# Patient Record
Sex: MALE | Race: WHITE | Employment: PART TIME | URBAN - METROPOLITAN AREA
[De-identification: names, ages, dates, MRNs, and addresses within clinical notes are randomized per-mention and may not be internally consistent; named-entity substitution may affect disease eponyms.]

---

## 2019-11-16 ENCOUNTER — HOSPITAL ENCOUNTER (EMERGENCY)
Age: 34
Discharge: HOME | End: 2019-11-16
Attending: EMERGENCY MEDICINE
Payer: COMMERCIAL

## 2019-11-16 VITALS
HEART RATE: 71 BPM | OXYGEN SATURATION: 97 % | TEMPERATURE: 98 F | WEIGHT: 182 LBS | BODY MASS INDEX: 26.96 KG/M2 | HEIGHT: 69 IN | DIASTOLIC BLOOD PRESSURE: 81 MMHG | SYSTOLIC BLOOD PRESSURE: 136 MMHG | RESPIRATION RATE: 18 BRPM

## 2019-11-16 DIAGNOSIS — R11.2 NON-INTRACTABLE VOMITING WITH NAUSEA, UNSPECIFIED VOMITING TYPE: Primary | ICD-10-CM

## 2019-11-16 PROCEDURE — 6360000002 HC RX W HCPCS: Performed by: EMERGENCY MEDICINE

## 2019-11-16 PROCEDURE — 99283 EMERGENCY DEPT VISIT LOW MDM: CPT

## 2019-11-16 PROCEDURE — 96372 THER/PROPH/DIAG INJ SC/IM: CPT

## 2019-11-16 RX ORDER — ONDANSETRON 2 MG/ML
4 INJECTION INTRAMUSCULAR; INTRAVENOUS ONCE
Status: COMPLETED | OUTPATIENT
Start: 2019-11-16 | End: 2019-11-16

## 2019-11-16 RX ORDER — ONDANSETRON 4 MG/1
4 TABLET, FILM COATED ORAL EVERY 8 HOURS PRN
Qty: 20 TABLET | Refills: 0 | Status: ON HOLD | OUTPATIENT
Start: 2019-11-16 | End: 2019-12-07 | Stop reason: HOSPADM

## 2019-11-16 RX ADMIN — ONDANSETRON 4 MG: 2 INJECTION INTRAMUSCULAR; INTRAVENOUS at 01:07

## 2019-11-16 ASSESSMENT — PAIN SCALES - GENERAL
PAINLEVEL_OUTOF10: 0
PAINLEVEL_OUTOF10: 0

## 2019-12-04 ENCOUNTER — APPOINTMENT (OUTPATIENT)
Dept: CT IMAGING | Age: 34
DRG: 282 | End: 2019-12-04
Payer: COMMERCIAL

## 2019-12-04 ENCOUNTER — HOSPITAL ENCOUNTER (INPATIENT)
Age: 34
LOS: 2 days | Discharge: HOME | DRG: 282 | End: 2019-12-07
Attending: EMERGENCY MEDICINE | Admitting: INTERNAL MEDICINE
Payer: COMMERCIAL

## 2019-12-04 DIAGNOSIS — R10.13 EPIGASTRIC PAIN: Primary | ICD-10-CM

## 2019-12-04 DIAGNOSIS — K85.90 ACUTE PANCREATITIS, UNSPECIFIED COMPLICATION STATUS, UNSPECIFIED PANCREATITIS TYPE: ICD-10-CM

## 2019-12-04 DIAGNOSIS — S36.119A: ICD-10-CM

## 2019-12-04 LAB
A/G RATIO: 1.3 (ref 1.1–2.2)
ALBUMIN SERPL-MCNC: 4.5 G/DL (ref 3.4–5)
ALP BLD-CCNC: 100 U/L (ref 40–129)
ALT SERPL-CCNC: 440 U/L (ref 10–40)
ANION GAP SERPL CALCULATED.3IONS-SCNC: 14 MMOL/L (ref 3–16)
AST SERPL-CCNC: 563 U/L (ref 15–37)
BASOPHILS ABSOLUTE: 0.1 K/UL (ref 0–0.2)
BASOPHILS RELATIVE PERCENT: 0.7 %
BILIRUB SERPL-MCNC: 0.7 MG/DL (ref 0–1)
BUN BLDV-MCNC: 10 MG/DL (ref 7–20)
CALCIUM SERPL-MCNC: 8.8 MG/DL (ref 8.3–10.6)
CHLORIDE BLD-SCNC: 100 MMOL/L (ref 99–110)
CO2: 24 MMOL/L (ref 21–32)
CREAT SERPL-MCNC: 0.6 MG/DL (ref 0.9–1.3)
EOSINOPHILS ABSOLUTE: 0 K/UL (ref 0–0.6)
EOSINOPHILS RELATIVE PERCENT: 0.4 %
GFR AFRICAN AMERICAN: >60
GFR NON-AFRICAN AMERICAN: >60
GLOBULIN: 3.5 G/DL
GLUCOSE BLD-MCNC: 110 MG/DL (ref 70–99)
HCT VFR BLD CALC: 42.1 % (ref 40.5–52.5)
HEMOGLOBIN: 14.8 G/DL (ref 13.5–17.5)
LIPASE: 224 U/L (ref 13–60)
LYMPHOCYTES ABSOLUTE: 1.7 K/UL (ref 1–5.1)
LYMPHOCYTES RELATIVE PERCENT: 22.2 %
MCH RBC QN AUTO: 32.4 PG (ref 26–34)
MCHC RBC AUTO-ENTMCNC: 35.1 G/DL (ref 31–36)
MCV RBC AUTO: 92.2 FL (ref 80–100)
MONOCYTES ABSOLUTE: 0.7 K/UL (ref 0–1.3)
MONOCYTES RELATIVE PERCENT: 9.4 %
NEUTROPHILS ABSOLUTE: 5.3 K/UL (ref 1.7–7.7)
NEUTROPHILS RELATIVE PERCENT: 67.3 %
PDW BLD-RTO: 13.7 % (ref 12.4–15.4)
PLATELET # BLD: 264 K/UL (ref 135–450)
PMV BLD AUTO: 8.9 FL (ref 5–10.5)
POTASSIUM REFLEX MAGNESIUM: 3.7 MMOL/L (ref 3.5–5.1)
RBC # BLD: 4.57 M/UL (ref 4.2–5.9)
SODIUM BLD-SCNC: 138 MMOL/L (ref 136–145)
TOTAL PROTEIN: 8 G/DL (ref 6.4–8.2)
WBC # BLD: 7.9 K/UL (ref 4–11)

## 2019-12-04 PROCEDURE — 85025 COMPLETE CBC W/AUTO DIFF WBC: CPT

## 2019-12-04 PROCEDURE — 83690 ASSAY OF LIPASE: CPT

## 2019-12-04 PROCEDURE — 80053 COMPREHEN METABOLIC PANEL: CPT

## 2019-12-04 PROCEDURE — 80074 ACUTE HEPATITIS PANEL: CPT

## 2019-12-04 PROCEDURE — 99285 EMERGENCY DEPT VISIT HI MDM: CPT

## 2019-12-04 RX ORDER — MORPHINE SULFATE 4 MG/ML
4 INJECTION, SOLUTION INTRAMUSCULAR; INTRAVENOUS ONCE
Status: COMPLETED | OUTPATIENT
Start: 2019-12-05 | End: 2019-12-05

## 2019-12-04 RX ORDER — ONDANSETRON 2 MG/ML
4 INJECTION INTRAMUSCULAR; INTRAVENOUS ONCE
Status: COMPLETED | OUTPATIENT
Start: 2019-12-04 | End: 2019-12-05

## 2019-12-04 RX ORDER — HYDROCODONE BITARTRATE AND ACETAMINOPHEN 5; 325 MG/1; MG/1
1 TABLET ORAL ONCE
Status: DISCONTINUED | OUTPATIENT
Start: 2019-12-04 | End: 2019-12-04

## 2019-12-04 RX ORDER — 0.9 % SODIUM CHLORIDE 0.9 %
500 INTRAVENOUS SOLUTION INTRAVENOUS ONCE
Status: COMPLETED | OUTPATIENT
Start: 2019-12-05 | End: 2019-12-05

## 2019-12-04 ASSESSMENT — PAIN DESCRIPTION - LOCATION: LOCATION: ABDOMEN

## 2019-12-04 ASSESSMENT — PAIN SCALES - GENERAL: PAINLEVEL_OUTOF10: 7

## 2019-12-04 ASSESSMENT — PAIN DESCRIPTION - ONSET: ONSET: ON-GOING

## 2019-12-04 ASSESSMENT — PAIN DESCRIPTION - ORIENTATION: ORIENTATION: RIGHT

## 2019-12-04 ASSESSMENT — PAIN DESCRIPTION - PROGRESSION: CLINICAL_PROGRESSION: NOT CHANGED

## 2019-12-04 ASSESSMENT — PAIN - FUNCTIONAL ASSESSMENT: PAIN_FUNCTIONAL_ASSESSMENT: PREVENTS OR INTERFERES WITH MANY ACTIVE NOT PASSIVE ACTIVITIES

## 2019-12-04 ASSESSMENT — PAIN DESCRIPTION - FREQUENCY: FREQUENCY: CONTINUOUS

## 2019-12-04 ASSESSMENT — PAIN DESCRIPTION - PAIN TYPE: TYPE: ACUTE PAIN

## 2019-12-04 ASSESSMENT — PAIN DESCRIPTION - DESCRIPTORS: DESCRIPTORS: ACHING

## 2019-12-05 ENCOUNTER — APPOINTMENT (OUTPATIENT)
Dept: ULTRASOUND IMAGING | Age: 34
DRG: 282 | End: 2019-12-05
Payer: COMMERCIAL

## 2019-12-05 ENCOUNTER — APPOINTMENT (OUTPATIENT)
Dept: CT IMAGING | Age: 34
DRG: 282 | End: 2019-12-05
Payer: COMMERCIAL

## 2019-12-05 PROBLEM — K85.90 PANCREATITIS, UNSPECIFIED PANCREATITIS TYPE: Status: ACTIVE | Noted: 2019-12-05

## 2019-12-05 LAB
A/G RATIO: 1.3 (ref 1.1–2.2)
ALBUMIN SERPL-MCNC: 4.5 G/DL (ref 3.4–5)
ALP BLD-CCNC: 95 U/L (ref 40–129)
ALT SERPL-CCNC: 412 U/L (ref 10–40)
ANION GAP SERPL CALCULATED.3IONS-SCNC: 15 MMOL/L (ref 3–16)
AST SERPL-CCNC: 492 U/L (ref 15–37)
BASOPHILS ABSOLUTE: 0.1 K/UL (ref 0–0.2)
BASOPHILS RELATIVE PERCENT: 0.8 %
BILIRUB SERPL-MCNC: 1.2 MG/DL (ref 0–1)
BILIRUBIN URINE: ABNORMAL
BLOOD, URINE: NEGATIVE
BUN BLDV-MCNC: 11 MG/DL (ref 7–20)
C DIFF TOXIN/ANTIGEN: NORMAL
CALCIUM SERPL-MCNC: 9.1 MG/DL (ref 8.3–10.6)
CHLORIDE BLD-SCNC: 97 MMOL/L (ref 99–110)
CLARITY: CLEAR
CO2: 22 MMOL/L (ref 21–32)
COLOR: ABNORMAL
CREAT SERPL-MCNC: 0.8 MG/DL (ref 0.9–1.3)
EOSINOPHILS ABSOLUTE: 0.1 K/UL (ref 0–0.6)
EOSINOPHILS RELATIVE PERCENT: 0.9 %
GFR AFRICAN AMERICAN: >60
GFR NON-AFRICAN AMERICAN: >60
GLOBULIN: 3.5 G/DL
GLUCOSE BLD-MCNC: 114 MG/DL (ref 70–99)
GLUCOSE URINE: NEGATIVE MG/DL
HAV IGM SER IA-ACNC: ABNORMAL
HCT VFR BLD CALC: 41.3 % (ref 40.5–52.5)
HEMOGLOBIN: 14.6 G/DL (ref 13.5–17.5)
HEPATITIS B CORE IGM ANTIBODY: ABNORMAL
HEPATITIS B SURFACE ANTIGEN INTERPRETATION: ABNORMAL
HEPATITIS C ANTIBODY INTERPRETATION: REACTIVE
KETONES, URINE: NEGATIVE MG/DL
LEUKOCYTE ESTERASE, URINE: NEGATIVE
LYMPHOCYTES ABSOLUTE: 2.6 K/UL (ref 1–5.1)
LYMPHOCYTES RELATIVE PERCENT: 19.8 %
MCH RBC QN AUTO: 32.5 PG (ref 26–34)
MCHC RBC AUTO-ENTMCNC: 35.3 G/DL (ref 31–36)
MCV RBC AUTO: 92.2 FL (ref 80–100)
MICROSCOPIC EXAMINATION: ABNORMAL
MONOCYTES ABSOLUTE: 1.5 K/UL (ref 0–1.3)
MONOCYTES RELATIVE PERCENT: 11.3 %
NEUTROPHILS ABSOLUTE: 8.6 K/UL (ref 1.7–7.7)
NEUTROPHILS RELATIVE PERCENT: 67.2 %
NITRITE, URINE: NEGATIVE
PDW BLD-RTO: 13.6 % (ref 12.4–15.4)
PH UA: 6 (ref 5–8)
PLATELET # BLD: 279 K/UL (ref 135–450)
PMV BLD AUTO: 8.9 FL (ref 5–10.5)
POTASSIUM REFLEX MAGNESIUM: 3.7 MMOL/L (ref 3.5–5.1)
PROTEIN UA: NEGATIVE MG/DL
RBC # BLD: 4.48 M/UL (ref 4.2–5.9)
SODIUM BLD-SCNC: 134 MMOL/L (ref 136–145)
SPECIFIC GRAVITY UA: 1.02 (ref 1–1.03)
TOTAL PROTEIN: 8 G/DL (ref 6.4–8.2)
URINE REFLEX TO CULTURE: ABNORMAL
URINE TYPE: ABNORMAL
UROBILINOGEN, URINE: 1 E.U./DL
WBC # BLD: 12.9 K/UL (ref 4–11)

## 2019-12-05 PROCEDURE — 96375 TX/PRO/DX INJ NEW DRUG ADDON: CPT

## 2019-12-05 PROCEDURE — 6360000002 HC RX W HCPCS: Performed by: INTERNAL MEDICINE

## 2019-12-05 PROCEDURE — 80053 COMPREHEN METABOLIC PANEL: CPT

## 2019-12-05 PROCEDURE — 1200000000 HC SEMI PRIVATE

## 2019-12-05 PROCEDURE — 85025 COMPLETE CBC W/AUTO DIFF WBC: CPT

## 2019-12-05 PROCEDURE — 96374 THER/PROPH/DIAG INJ IV PUSH: CPT

## 2019-12-05 PROCEDURE — 6370000000 HC RX 637 (ALT 250 FOR IP): Performed by: INTERNAL MEDICINE

## 2019-12-05 PROCEDURE — 2580000003 HC RX 258: Performed by: INTERNAL MEDICINE

## 2019-12-05 PROCEDURE — 2500000003 HC RX 250 WO HCPCS: Performed by: INTERNAL MEDICINE

## 2019-12-05 PROCEDURE — 74176 CT ABD & PELVIS W/O CONTRAST: CPT

## 2019-12-05 PROCEDURE — 87449 NOS EACH ORGANISM AG IA: CPT

## 2019-12-05 PROCEDURE — 81003 URINALYSIS AUTO W/O SCOPE: CPT

## 2019-12-05 PROCEDURE — 74177 CT ABD & PELVIS W/CONTRAST: CPT

## 2019-12-05 PROCEDURE — 87324 CLOSTRIDIUM AG IA: CPT

## 2019-12-05 PROCEDURE — 76705 ECHO EXAM OF ABDOMEN: CPT

## 2019-12-05 PROCEDURE — 2580000003 HC RX 258: Performed by: EMERGENCY MEDICINE

## 2019-12-05 PROCEDURE — 6360000004 HC RX CONTRAST MEDICATION: Performed by: EMERGENCY MEDICINE

## 2019-12-05 PROCEDURE — 6360000002 HC RX W HCPCS: Performed by: EMERGENCY MEDICINE

## 2019-12-05 PROCEDURE — 36415 COLL VENOUS BLD VENIPUNCTURE: CPT

## 2019-12-05 RX ORDER — SODIUM CHLORIDE 9 MG/ML
INJECTION, SOLUTION INTRAVENOUS CONTINUOUS
Status: DISCONTINUED | OUTPATIENT
Start: 2019-12-05 | End: 2019-12-07 | Stop reason: HOSPADM

## 2019-12-05 RX ORDER — FAMOTIDINE 20 MG/1
20 TABLET, FILM COATED ORAL 2 TIMES DAILY
Status: DISCONTINUED | OUTPATIENT
Start: 2019-12-05 | End: 2019-12-07 | Stop reason: HOSPADM

## 2019-12-05 RX ORDER — MORPHINE SULFATE 4 MG/ML
4 INJECTION, SOLUTION INTRAMUSCULAR; INTRAVENOUS ONCE
Status: COMPLETED | OUTPATIENT
Start: 2019-12-05 | End: 2019-12-05

## 2019-12-05 RX ORDER — MORPHINE SULFATE 4 MG/ML
4 INJECTION, SOLUTION INTRAMUSCULAR; INTRAVENOUS
Status: DISCONTINUED | OUTPATIENT
Start: 2019-12-05 | End: 2019-12-07 | Stop reason: HOSPADM

## 2019-12-05 RX ORDER — SODIUM CHLORIDE 0.9 % (FLUSH) 0.9 %
10 SYRINGE (ML) INJECTION PRN
Status: DISCONTINUED | OUTPATIENT
Start: 2019-12-05 | End: 2019-12-07 | Stop reason: HOSPADM

## 2019-12-05 RX ORDER — NICOTINE 21 MG/24HR
1 PATCH, TRANSDERMAL 24 HOURS TRANSDERMAL DAILY
Status: DISCONTINUED | OUTPATIENT
Start: 2019-12-05 | End: 2019-12-07 | Stop reason: HOSPADM

## 2019-12-05 RX ORDER — ONDANSETRON 2 MG/ML
4 INJECTION INTRAMUSCULAR; INTRAVENOUS EVERY 4 HOURS PRN
Status: DISCONTINUED | OUTPATIENT
Start: 2019-12-05 | End: 2019-12-07 | Stop reason: HOSPADM

## 2019-12-05 RX ORDER — SODIUM CHLORIDE 0.9 % (FLUSH) 0.9 %
10 SYRINGE (ML) INJECTION EVERY 12 HOURS SCHEDULED
Status: DISCONTINUED | OUTPATIENT
Start: 2019-12-05 | End: 2019-12-07 | Stop reason: HOSPADM

## 2019-12-05 RX ORDER — 0.9 % SODIUM CHLORIDE 0.9 %
500 INTRAVENOUS SOLUTION INTRAVENOUS ONCE
Status: COMPLETED | OUTPATIENT
Start: 2019-12-05 | End: 2019-12-05

## 2019-12-05 RX ADMIN — ONDANSETRON 4 MG: 2 INJECTION INTRAMUSCULAR; INTRAVENOUS at 04:27

## 2019-12-05 RX ADMIN — MORPHINE SULFATE 4 MG: 4 INJECTION, SOLUTION INTRAMUSCULAR; INTRAVENOUS at 10:35

## 2019-12-05 RX ADMIN — ENOXAPARIN SODIUM 40 MG: 40 INJECTION SUBCUTANEOUS at 09:11

## 2019-12-05 RX ADMIN — MORPHINE SULFATE 4 MG: 4 INJECTION, SOLUTION INTRAMUSCULAR; INTRAVENOUS at 16:16

## 2019-12-05 RX ADMIN — FAMOTIDINE 20 MG: 20 TABLET, FILM COATED ORAL at 20:02

## 2019-12-05 RX ADMIN — SODIUM CHLORIDE 500 ML: 9 INJECTION, SOLUTION INTRAVENOUS at 05:48

## 2019-12-05 RX ADMIN — MORPHINE SULFATE 4 MG: 4 INJECTION, SOLUTION INTRAMUSCULAR; INTRAVENOUS at 02:35

## 2019-12-05 RX ADMIN — IOPAMIDOL 75 ML: 755 INJECTION, SOLUTION INTRAVENOUS at 01:11

## 2019-12-05 RX ADMIN — MORPHINE SULFATE 4 MG: 4 INJECTION, SOLUTION INTRAMUSCULAR; INTRAVENOUS at 20:02

## 2019-12-05 RX ADMIN — FOLIC ACID: 5 INJECTION, SOLUTION INTRAMUSCULAR; INTRAVENOUS; SUBCUTANEOUS at 18:13

## 2019-12-05 RX ADMIN — MORPHINE SULFATE 4 MG: 4 INJECTION, SOLUTION INTRAMUSCULAR; INTRAVENOUS at 06:53

## 2019-12-05 RX ADMIN — SODIUM CHLORIDE: 9 INJECTION, SOLUTION INTRAVENOUS at 23:13

## 2019-12-05 RX ADMIN — SODIUM CHLORIDE: 9 INJECTION, SOLUTION INTRAVENOUS at 16:23

## 2019-12-05 RX ADMIN — SODIUM CHLORIDE 500 ML: 9 INJECTION, SOLUTION INTRAVENOUS at 02:34

## 2019-12-05 RX ADMIN — SODIUM CHLORIDE: 9 INJECTION, SOLUTION INTRAVENOUS at 06:51

## 2019-12-05 RX ADMIN — FAMOTIDINE 20 MG: 20 TABLET, FILM COATED ORAL at 09:11

## 2019-12-05 RX ADMIN — MORPHINE SULFATE 4 MG: 4 INJECTION, SOLUTION INTRAMUSCULAR; INTRAVENOUS at 04:29

## 2019-12-05 RX ADMIN — ONDANSETRON 4 MG: 2 INJECTION INTRAMUSCULAR; INTRAVENOUS at 02:34

## 2019-12-05 ASSESSMENT — PAIN DESCRIPTION - PAIN TYPE
TYPE: ACUTE PAIN

## 2019-12-05 ASSESSMENT — PAIN SCALES - GENERAL
PAINLEVEL_OUTOF10: 8
PAINLEVEL_OUTOF10: 8
PAINLEVEL_OUTOF10: 0
PAINLEVEL_OUTOF10: 8
PAINLEVEL_OUTOF10: 5
PAINLEVEL_OUTOF10: 2
PAINLEVEL_OUTOF10: 9
PAINLEVEL_OUTOF10: 8
PAINLEVEL_OUTOF10: 5
PAINLEVEL_OUTOF10: 9

## 2019-12-05 ASSESSMENT — PAIN DESCRIPTION - ONSET
ONSET: ON-GOING

## 2019-12-05 ASSESSMENT — PAIN - FUNCTIONAL ASSESSMENT
PAIN_FUNCTIONAL_ASSESSMENT: ACTIVITIES ARE NOT PREVENTED
PAIN_FUNCTIONAL_ASSESSMENT: ACTIVITIES ARE NOT PREVENTED
PAIN_FUNCTIONAL_ASSESSMENT: PREVENTS OR INTERFERES SOME ACTIVE ACTIVITIES AND ADLS
PAIN_FUNCTIONAL_ASSESSMENT: PREVENTS OR INTERFERES SOME ACTIVE ACTIVITIES AND ADLS

## 2019-12-05 ASSESSMENT — PAIN DESCRIPTION - DESCRIPTORS
DESCRIPTORS: BURNING;PRESSURE;SHOOTING
DESCRIPTORS: BURNING;PRESSURE;SHOOTING
DESCRIPTORS: ACHING
DESCRIPTORS: ACHING
DESCRIPTORS: BURNING;SHOOTING
DESCRIPTORS: PRESSURE

## 2019-12-05 ASSESSMENT — PAIN DESCRIPTION - LOCATION
LOCATION: ABDOMEN

## 2019-12-05 ASSESSMENT — PAIN DESCRIPTION - FREQUENCY
FREQUENCY: CONTINUOUS
FREQUENCY: INTERMITTENT
FREQUENCY: INTERMITTENT
FREQUENCY: CONTINUOUS
FREQUENCY: INTERMITTENT
FREQUENCY: CONTINUOUS

## 2019-12-05 ASSESSMENT — PAIN DESCRIPTION - ORIENTATION
ORIENTATION: RIGHT
ORIENTATION: RIGHT;LEFT
ORIENTATION: RIGHT;LEFT
ORIENTATION: RIGHT
ORIENTATION: RIGHT;LEFT
ORIENTATION: UPPER
ORIENTATION: RIGHT;LEFT

## 2019-12-05 ASSESSMENT — ENCOUNTER SYMPTOMS
RHINORRHEA: 0
BACK PAIN: 0
NAUSEA: 1
ABDOMINAL PAIN: 1
VOMITING: 0
COLOR CHANGE: 0
WHEEZING: 0
SHORTNESS OF BREATH: 0
PHOTOPHOBIA: 0

## 2019-12-05 ASSESSMENT — PAIN DESCRIPTION - PROGRESSION
CLINICAL_PROGRESSION: GRADUALLY WORSENING
CLINICAL_PROGRESSION: NOT CHANGED

## 2019-12-05 ASSESSMENT — PAIN DESCRIPTION - DIRECTION: RADIATING_TOWARDS: NON RADIATING

## 2019-12-06 ENCOUNTER — APPOINTMENT (OUTPATIENT)
Dept: MRI IMAGING | Age: 34
DRG: 282 | End: 2019-12-06
Payer: COMMERCIAL

## 2019-12-06 PROBLEM — F10.10 ALCOHOL ABUSE: Status: ACTIVE | Noted: 2019-12-06

## 2019-12-06 PROBLEM — K80.20 CHOLELITHIASIS: Status: ACTIVE | Noted: 2019-12-06

## 2019-12-06 PROBLEM — F17.200 TOBACCO DEPENDENCE: Status: ACTIVE | Noted: 2019-12-06

## 2019-12-06 LAB
A/G RATIO: 1.3 (ref 1.1–2.2)
ALBUMIN SERPL-MCNC: 3.9 G/DL (ref 3.4–5)
ALP BLD-CCNC: 85 U/L (ref 40–129)
ALT SERPL-CCNC: 286 U/L (ref 10–40)
ANION GAP SERPL CALCULATED.3IONS-SCNC: 13 MMOL/L (ref 3–16)
AST SERPL-CCNC: 301 U/L (ref 15–37)
BASOPHILS ABSOLUTE: 0 K/UL (ref 0–0.2)
BASOPHILS RELATIVE PERCENT: 0.7 %
BILIRUB SERPL-MCNC: 1.8 MG/DL (ref 0–1)
BUN BLDV-MCNC: 7 MG/DL (ref 7–20)
CALCIUM SERPL-MCNC: 8.4 MG/DL (ref 8.3–10.6)
CHLORIDE BLD-SCNC: 102 MMOL/L (ref 99–110)
CO2: 22 MMOL/L (ref 21–32)
CREAT SERPL-MCNC: 0.6 MG/DL (ref 0.9–1.3)
EOSINOPHILS ABSOLUTE: 0.2 K/UL (ref 0–0.6)
EOSINOPHILS RELATIVE PERCENT: 2.7 %
GFR AFRICAN AMERICAN: >60
GFR NON-AFRICAN AMERICAN: >60
GLOBULIN: 3.1 G/DL
GLUCOSE BLD-MCNC: 124 MG/DL (ref 70–99)
HCT VFR BLD CALC: 36.7 % (ref 40.5–52.5)
HEMOGLOBIN: 12.9 G/DL (ref 13.5–17.5)
LIPASE: 130 U/L (ref 13–60)
LYMPHOCYTES ABSOLUTE: 1.7 K/UL (ref 1–5.1)
LYMPHOCYTES RELATIVE PERCENT: 28.7 %
MAGNESIUM: 1.7 MG/DL (ref 1.8–2.4)
MCH RBC QN AUTO: 32.7 PG (ref 26–34)
MCHC RBC AUTO-ENTMCNC: 35.3 G/DL (ref 31–36)
MCV RBC AUTO: 92.7 FL (ref 80–100)
MONOCYTES ABSOLUTE: 0.8 K/UL (ref 0–1.3)
MONOCYTES RELATIVE PERCENT: 13.7 %
NEUTROPHILS ABSOLUTE: 3.2 K/UL (ref 1.7–7.7)
NEUTROPHILS RELATIVE PERCENT: 54.2 %
PDW BLD-RTO: 13.4 % (ref 12.4–15.4)
PLATELET # BLD: 206 K/UL (ref 135–450)
PMV BLD AUTO: 9.1 FL (ref 5–10.5)
POTASSIUM REFLEX MAGNESIUM: 3.4 MMOL/L (ref 3.5–5.1)
RBC # BLD: 3.95 M/UL (ref 4.2–5.9)
SODIUM BLD-SCNC: 137 MMOL/L (ref 136–145)
TOTAL PROTEIN: 7 G/DL (ref 6.4–8.2)
WBC # BLD: 5.9 K/UL (ref 4–11)

## 2019-12-06 PROCEDURE — 36415 COLL VENOUS BLD VENIPUNCTURE: CPT

## 2019-12-06 PROCEDURE — 6370000000 HC RX 637 (ALT 250 FOR IP): Performed by: INTERNAL MEDICINE

## 2019-12-06 PROCEDURE — 1200000000 HC SEMI PRIVATE

## 2019-12-06 PROCEDURE — 74181 MRI ABDOMEN W/O CONTRAST: CPT

## 2019-12-06 PROCEDURE — 6360000002 HC RX W HCPCS: Performed by: INTERNAL MEDICINE

## 2019-12-06 PROCEDURE — 80053 COMPREHEN METABOLIC PANEL: CPT

## 2019-12-06 PROCEDURE — 83735 ASSAY OF MAGNESIUM: CPT

## 2019-12-06 PROCEDURE — 85025 COMPLETE CBC W/AUTO DIFF WBC: CPT

## 2019-12-06 PROCEDURE — 6360000002 HC RX W HCPCS: Performed by: FAMILY MEDICINE

## 2019-12-06 PROCEDURE — 2580000003 HC RX 258: Performed by: INTERNAL MEDICINE

## 2019-12-06 PROCEDURE — 83690 ASSAY OF LIPASE: CPT

## 2019-12-06 PROCEDURE — 6370000000 HC RX 637 (ALT 250 FOR IP): Performed by: FAMILY MEDICINE

## 2019-12-06 RX ORDER — POTASSIUM CHLORIDE 7.45 MG/ML
10 INJECTION INTRAVENOUS PRN
Status: DISCONTINUED | OUTPATIENT
Start: 2019-12-06 | End: 2019-12-07 | Stop reason: HOSPADM

## 2019-12-06 RX ORDER — POTASSIUM CHLORIDE 20 MEQ/1
40 TABLET, EXTENDED RELEASE ORAL PRN
Status: DISCONTINUED | OUTPATIENT
Start: 2019-12-06 | End: 2019-12-07 | Stop reason: HOSPADM

## 2019-12-06 RX ORDER — MAGNESIUM SULFATE 1 G/100ML
1 INJECTION INTRAVENOUS PRN
Status: DISCONTINUED | OUTPATIENT
Start: 2019-12-06 | End: 2019-12-07 | Stop reason: HOSPADM

## 2019-12-06 RX ADMIN — Medication 10 ML: at 08:16

## 2019-12-06 RX ADMIN — MORPHINE SULFATE 4 MG: 4 INJECTION, SOLUTION INTRAMUSCULAR; INTRAVENOUS at 14:49

## 2019-12-06 RX ADMIN — MORPHINE SULFATE 4 MG: 4 INJECTION, SOLUTION INTRAMUSCULAR; INTRAVENOUS at 11:32

## 2019-12-06 RX ADMIN — Medication 10 ML: at 21:25

## 2019-12-06 RX ADMIN — MAGNESIUM SULFATE HEPTAHYDRATE 1 G: 1 INJECTION, SOLUTION INTRAVENOUS at 11:43

## 2019-12-06 RX ADMIN — SODIUM CHLORIDE: 9 INJECTION, SOLUTION INTRAVENOUS at 14:36

## 2019-12-06 RX ADMIN — MORPHINE SULFATE 4 MG: 4 INJECTION, SOLUTION INTRAMUSCULAR; INTRAVENOUS at 02:08

## 2019-12-06 RX ADMIN — POTASSIUM CHLORIDE 40 MEQ: 1500 TABLET, EXTENDED RELEASE ORAL at 11:43

## 2019-12-06 RX ADMIN — ENOXAPARIN SODIUM 40 MG: 40 INJECTION SUBCUTANEOUS at 08:16

## 2019-12-06 RX ADMIN — MORPHINE SULFATE 4 MG: 4 INJECTION, SOLUTION INTRAMUSCULAR; INTRAVENOUS at 18:16

## 2019-12-06 RX ADMIN — MORPHINE SULFATE 4 MG: 4 INJECTION, SOLUTION INTRAMUSCULAR; INTRAVENOUS at 08:16

## 2019-12-06 RX ADMIN — FAMOTIDINE 20 MG: 20 TABLET, FILM COATED ORAL at 08:16

## 2019-12-06 RX ADMIN — FAMOTIDINE 20 MG: 20 TABLET, FILM COATED ORAL at 21:25

## 2019-12-06 RX ADMIN — MORPHINE SULFATE 4 MG: 4 INJECTION, SOLUTION INTRAMUSCULAR; INTRAVENOUS at 21:25

## 2019-12-06 ASSESSMENT — PAIN DESCRIPTION - FREQUENCY
FREQUENCY: CONTINUOUS

## 2019-12-06 ASSESSMENT — PAIN DESCRIPTION - PAIN TYPE
TYPE: ACUTE PAIN

## 2019-12-06 ASSESSMENT — PAIN DESCRIPTION - PROGRESSION
CLINICAL_PROGRESSION: NOT CHANGED

## 2019-12-06 ASSESSMENT — PAIN DESCRIPTION - LOCATION
LOCATION: ABDOMEN

## 2019-12-06 ASSESSMENT — PAIN DESCRIPTION - ORIENTATION
ORIENTATION: RIGHT;LEFT
ORIENTATION: MID
ORIENTATION: RIGHT;LEFT
ORIENTATION: MID

## 2019-12-06 ASSESSMENT — PAIN - FUNCTIONAL ASSESSMENT
PAIN_FUNCTIONAL_ASSESSMENT: ACTIVITIES ARE NOT PREVENTED

## 2019-12-06 ASSESSMENT — PAIN SCALES - GENERAL
PAINLEVEL_OUTOF10: 6
PAINLEVEL_OUTOF10: 7
PAINLEVEL_OUTOF10: 8
PAINLEVEL_OUTOF10: 7
PAINLEVEL_OUTOF10: 7
PAINLEVEL_OUTOF10: 5
PAINLEVEL_OUTOF10: 6
PAINLEVEL_OUTOF10: 8
PAINLEVEL_OUTOF10: 8
PAINLEVEL_OUTOF10: 4
PAINLEVEL_OUTOF10: 5
PAINLEVEL_OUTOF10: 7

## 2019-12-06 ASSESSMENT — PAIN DESCRIPTION - ONSET
ONSET: ON-GOING

## 2019-12-06 ASSESSMENT — PAIN DESCRIPTION - DESCRIPTORS
DESCRIPTORS: ACHING

## 2019-12-07 VITALS
RESPIRATION RATE: 18 BRPM | OXYGEN SATURATION: 97 % | DIASTOLIC BLOOD PRESSURE: 63 MMHG | BODY MASS INDEX: 28.54 KG/M2 | WEIGHT: 192.68 LBS | HEART RATE: 81 BPM | SYSTOLIC BLOOD PRESSURE: 136 MMHG | HEIGHT: 69 IN | TEMPERATURE: 98.5 F

## 2019-12-07 LAB
A/G RATIO: 1.2 (ref 1.1–2.2)
ALBUMIN SERPL-MCNC: 4.3 G/DL (ref 3.4–5)
ALP BLD-CCNC: 85 U/L (ref 40–129)
ALT SERPL-CCNC: 245 U/L (ref 10–40)
ANION GAP SERPL CALCULATED.3IONS-SCNC: 17 MMOL/L (ref 3–16)
AST SERPL-CCNC: 215 U/L (ref 15–37)
BASOPHILS ABSOLUTE: 0 K/UL (ref 0–0.2)
BASOPHILS RELATIVE PERCENT: 0.6 %
BILIRUB SERPL-MCNC: 1.5 MG/DL (ref 0–1)
BUN BLDV-MCNC: 4 MG/DL (ref 7–20)
CALCIUM SERPL-MCNC: 9.1 MG/DL (ref 8.3–10.6)
CHLORIDE BLD-SCNC: 99 MMOL/L (ref 99–110)
CO2: 22 MMOL/L (ref 21–32)
CREAT SERPL-MCNC: 0.5 MG/DL (ref 0.9–1.3)
EOSINOPHILS ABSOLUTE: 0.1 K/UL (ref 0–0.6)
EOSINOPHILS RELATIVE PERCENT: 0.7 %
GFR AFRICAN AMERICAN: >60
GFR NON-AFRICAN AMERICAN: >60
GLOBULIN: 3.6 G/DL
GLUCOSE BLD-MCNC: 97 MG/DL (ref 70–99)
HCT VFR BLD CALC: 36.4 % (ref 40.5–52.5)
HEMOGLOBIN: 12.8 G/DL (ref 13.5–17.5)
LIPASE: 73 U/L (ref 13–60)
LYMPHOCYTES ABSOLUTE: 1 K/UL (ref 1–5.1)
LYMPHOCYTES RELATIVE PERCENT: 14.2 %
MCH RBC QN AUTO: 32.6 PG (ref 26–34)
MCHC RBC AUTO-ENTMCNC: 35.2 G/DL (ref 31–36)
MCV RBC AUTO: 92.6 FL (ref 80–100)
MONOCYTES ABSOLUTE: 0.8 K/UL (ref 0–1.3)
MONOCYTES RELATIVE PERCENT: 11.1 %
NEUTROPHILS ABSOLUTE: 5.3 K/UL (ref 1.7–7.7)
NEUTROPHILS RELATIVE PERCENT: 73.4 %
PDW BLD-RTO: 13 % (ref 12.4–15.4)
PLATELET # BLD: 214 K/UL (ref 135–450)
PMV BLD AUTO: 9.6 FL (ref 5–10.5)
POTASSIUM REFLEX MAGNESIUM: 4 MMOL/L (ref 3.5–5.1)
RBC # BLD: 3.93 M/UL (ref 4.2–5.9)
SODIUM BLD-SCNC: 138 MMOL/L (ref 136–145)
TOTAL PROTEIN: 7.9 G/DL (ref 6.4–8.2)
WBC # BLD: 7.2 K/UL (ref 4–11)

## 2019-12-07 PROCEDURE — 6360000002 HC RX W HCPCS: Performed by: INTERNAL MEDICINE

## 2019-12-07 PROCEDURE — 80053 COMPREHEN METABOLIC PANEL: CPT

## 2019-12-07 PROCEDURE — 99232 SBSQ HOSP IP/OBS MODERATE 35: CPT | Performed by: SURGERY

## 2019-12-07 PROCEDURE — 36415 COLL VENOUS BLD VENIPUNCTURE: CPT

## 2019-12-07 PROCEDURE — 85025 COMPLETE CBC W/AUTO DIFF WBC: CPT

## 2019-12-07 PROCEDURE — APPSS15 APP SPLIT SHARED TIME 0-15 MINUTES: Performed by: PHYSICIAN ASSISTANT

## 2019-12-07 PROCEDURE — 2580000003 HC RX 258: Performed by: INTERNAL MEDICINE

## 2019-12-07 PROCEDURE — 83690 ASSAY OF LIPASE: CPT

## 2019-12-07 PROCEDURE — APPNB30 APP NON BILLABLE TIME 0-30 MINS: Performed by: PHYSICIAN ASSISTANT

## 2019-12-07 PROCEDURE — 6370000000 HC RX 637 (ALT 250 FOR IP): Performed by: INTERNAL MEDICINE

## 2019-12-07 RX ORDER — NICOTINE 21 MG/24HR
1 PATCH, TRANSDERMAL 24 HOURS TRANSDERMAL DAILY
Qty: 30 PATCH | Refills: 0 | Status: SHIPPED | OUTPATIENT
Start: 2019-12-08 | End: 2022-01-18

## 2019-12-07 RX ADMIN — FAMOTIDINE 20 MG: 20 TABLET, FILM COATED ORAL at 07:53

## 2019-12-07 RX ADMIN — MORPHINE SULFATE 4 MG: 4 INJECTION, SOLUTION INTRAMUSCULAR; INTRAVENOUS at 03:31

## 2019-12-07 RX ADMIN — SODIUM CHLORIDE: 9 INJECTION, SOLUTION INTRAVENOUS at 09:09

## 2019-12-07 RX ADMIN — MORPHINE SULFATE 4 MG: 4 INJECTION, SOLUTION INTRAMUSCULAR; INTRAVENOUS at 09:04

## 2019-12-07 RX ADMIN — ENOXAPARIN SODIUM 40 MG: 40 INJECTION SUBCUTANEOUS at 07:53

## 2019-12-07 RX ADMIN — SODIUM CHLORIDE: 9 INJECTION, SOLUTION INTRAVENOUS at 04:10

## 2019-12-07 ASSESSMENT — PAIN DESCRIPTION - LOCATION
LOCATION: ABDOMEN

## 2019-12-07 ASSESSMENT — PAIN - FUNCTIONAL ASSESSMENT: PAIN_FUNCTIONAL_ASSESSMENT: ACTIVITIES ARE NOT PREVENTED

## 2019-12-07 ASSESSMENT — PAIN DESCRIPTION - PROGRESSION: CLINICAL_PROGRESSION: NOT CHANGED

## 2019-12-07 ASSESSMENT — PAIN DESCRIPTION - PAIN TYPE
TYPE: ACUTE PAIN

## 2019-12-07 ASSESSMENT — PAIN DESCRIPTION - DESCRIPTORS: DESCRIPTORS: ACHING

## 2019-12-07 ASSESSMENT — PAIN DESCRIPTION - ORIENTATION
ORIENTATION: MID

## 2019-12-07 ASSESSMENT — PAIN DESCRIPTION - FREQUENCY: FREQUENCY: CONTINUOUS

## 2019-12-07 ASSESSMENT — PAIN SCALES - GENERAL
PAINLEVEL_OUTOF10: 7
PAINLEVEL_OUTOF10: 7
PAINLEVEL_OUTOF10: 5
PAINLEVEL_OUTOF10: 3

## 2019-12-07 ASSESSMENT — PAIN DESCRIPTION - ONSET: ONSET: ON-GOING

## 2019-12-16 ENCOUNTER — INITIAL CONSULT (OUTPATIENT)
Dept: SURGERY | Age: 34
End: 2019-12-16

## 2019-12-16 VITALS
BODY MASS INDEX: 28.44 KG/M2 | HEIGHT: 69 IN | SYSTOLIC BLOOD PRESSURE: 130 MMHG | DIASTOLIC BLOOD PRESSURE: 80 MMHG | WEIGHT: 192 LBS

## 2019-12-16 DIAGNOSIS — K85.90 PANCREATITIS, UNSPECIFIED PANCREATITIS TYPE: Primary | ICD-10-CM

## 2019-12-16 PROCEDURE — 99999 PR OFFICE/OUTPT VISIT,PROCEDURE ONLY: CPT | Performed by: SURGERY

## 2022-01-18 ENCOUNTER — APPOINTMENT (OUTPATIENT)
Dept: GENERAL RADIOLOGY | Age: 37
End: 2022-01-18
Payer: COMMERCIAL

## 2022-01-18 ENCOUNTER — HOSPITAL ENCOUNTER (EMERGENCY)
Age: 37
Discharge: HOME | End: 2022-01-18
Attending: STUDENT IN AN ORGANIZED HEALTH CARE EDUCATION/TRAINING PROGRAM
Payer: COMMERCIAL

## 2022-01-18 VITALS
SYSTOLIC BLOOD PRESSURE: 142 MMHG | DIASTOLIC BLOOD PRESSURE: 98 MMHG | OXYGEN SATURATION: 96 % | RESPIRATION RATE: 16 BRPM | WEIGHT: 202.38 LBS | HEART RATE: 104 BPM | BODY MASS INDEX: 29.89 KG/M2 | TEMPERATURE: 98.4 F

## 2022-01-18 DIAGNOSIS — Z20.822 COVID-19 RULED OUT: Primary | ICD-10-CM

## 2022-01-18 LAB
RAPID INFLUENZA  B AGN: NEGATIVE
RAPID INFLUENZA A AGN: NEGATIVE

## 2022-01-18 PROCEDURE — U0003 INFECTIOUS AGENT DETECTION BY NUCLEIC ACID (DNA OR RNA); SEVERE ACUTE RESPIRATORY SYNDROME CORONAVIRUS 2 (SARS-COV-2) (CORONAVIRUS DISEASE [COVID-19]), AMPLIFIED PROBE TECHNIQUE, MAKING USE OF HIGH THROUGHPUT TECHNOLOGIES AS DESCRIBED BY CMS-2020-01-R: HCPCS

## 2022-01-18 PROCEDURE — 87804 INFLUENZA ASSAY W/OPTIC: CPT

## 2022-01-18 PROCEDURE — 99284 EMERGENCY DEPT VISIT MOD MDM: CPT

## 2022-01-18 PROCEDURE — U0005 INFEC AGEN DETEC AMPLI PROBE: HCPCS

## 2022-01-18 PROCEDURE — 71046 X-RAY EXAM CHEST 2 VIEWS: CPT

## 2022-01-18 NOTE — Clinical Note
Beti Limon was seen and treated in our emergency department on 1/18/2022. He may return to work on 01/20/2022. You were seen in the emergency room with concerns of COVID-19. COVID test was obtained and your test is pending. We recommend you stay at home until your test resolved. If positive you may self quarantine if negative you may return to work in 48 hours     If you have any questions or concerns, please don't hesitate to call.       Randall Pérez MD

## 2022-01-19 LAB — SARS-COV-2: DETECTED

## 2022-01-19 NOTE — ED TRIAGE NOTES
Pt to ED with complaint of cough x 4-5 days and intermittent chills. States has had 1-2 episodes of vomiting per day due to coughing mucous. Pt awake, alert. Skin warm, dry with good turgor. Resp appear unlabored.
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
## 2022-04-15 ENCOUNTER — HOSPITAL ENCOUNTER (OUTPATIENT)
Age: 37
Discharge: HOME | End: 2022-04-15
Payer: COMMERCIAL

## 2022-04-15 ENCOUNTER — HOSPITAL ENCOUNTER (OUTPATIENT)
Dept: GENERAL RADIOLOGY | Age: 37
Discharge: HOME | End: 2022-04-15
Payer: COMMERCIAL

## 2022-04-15 ENCOUNTER — HOSPITAL ENCOUNTER (OUTPATIENT)
Age: 37
Setting detail: SPECIMEN
Discharge: HOME | End: 2022-04-15
Payer: COMMERCIAL

## 2022-04-15 DIAGNOSIS — R05.9 COUGH: ICD-10-CM

## 2022-04-15 LAB
A/G RATIO: 1 (ref 1.1–2.2)
ALBUMIN SERPL-MCNC: 4.3 G/DL (ref 3.4–5)
ALP BLD-CCNC: 93 U/L (ref 40–129)
ALT SERPL-CCNC: 147 U/L (ref 10–40)
ANION GAP SERPL CALCULATED.3IONS-SCNC: 13 MMOL/L (ref 3–16)
AST SERPL-CCNC: 260 U/L (ref 15–37)
BASOPHILS ABSOLUTE: 0 K/UL (ref 0–0.2)
BASOPHILS RELATIVE PERCENT: 0.9 %
BILIRUB SERPL-MCNC: 1.9 MG/DL (ref 0–1)
BUN BLDV-MCNC: 6 MG/DL (ref 7–20)
CALCIUM SERPL-MCNC: 9 MG/DL (ref 8.3–10.6)
CHLORIDE BLD-SCNC: 103 MMOL/L (ref 99–110)
CHOLESTEROL, TOTAL: 136 MG/DL (ref 0–199)
CO2: 24 MMOL/L (ref 21–32)
CREAT SERPL-MCNC: <0.5 MG/DL (ref 0.9–1.3)
EOSINOPHILS ABSOLUTE: 0.1 K/UL (ref 0–0.6)
EOSINOPHILS RELATIVE PERCENT: 3.2 %
FOLATE: 6.1 NG/ML (ref 4.78–24.2)
GFR AFRICAN AMERICAN: >60
GFR NON-AFRICAN AMERICAN: >60
GLUCOSE BLD-MCNC: 106 MG/DL (ref 70–99)
HCT VFR BLD CALC: 39.8 % (ref 40.5–52.5)
HDLC SERPL-MCNC: 28 MG/DL (ref 40–60)
HEMOGLOBIN: 13.9 G/DL (ref 13.5–17.5)
LDL CHOLESTEROL CALCULATED: 90 MG/DL
LYMPHOCYTES ABSOLUTE: 0.8 K/UL (ref 1–5.1)
LYMPHOCYTES RELATIVE PERCENT: 25.8 %
MCH RBC QN AUTO: 33.7 PG (ref 26–34)
MCHC RBC AUTO-ENTMCNC: 35 G/DL (ref 31–36)
MCV RBC AUTO: 96.2 FL (ref 80–100)
MONOCYTES ABSOLUTE: 0.4 K/UL (ref 0–1.3)
MONOCYTES RELATIVE PERCENT: 12.9 %
NEUTROPHILS ABSOLUTE: 1.9 K/UL (ref 1.7–7.7)
NEUTROPHILS RELATIVE PERCENT: 57.2 %
PDW BLD-RTO: 14.1 % (ref 12.4–15.4)
PLATELET # BLD: 128 K/UL (ref 135–450)
PMV BLD AUTO: 8.8 FL (ref 5–10.5)
POTASSIUM SERPL-SCNC: 4.1 MMOL/L (ref 3.5–5.1)
RBC # BLD: 4.13 M/UL (ref 4.2–5.9)
SEDIMENTATION RATE, ERYTHROCYTE: 54 MM/HR (ref 0–15)
SODIUM BLD-SCNC: 140 MMOL/L (ref 136–145)
TOTAL PROTEIN: 8.4 G/DL (ref 6.4–8.2)
TRIGL SERPL-MCNC: 88 MG/DL (ref 0–150)
TSH SERPL DL<=0.05 MIU/L-ACNC: 1.7 UIU/ML (ref 0.27–4.2)
VITAMIN B-12: 636 PG/ML (ref 211–911)
VITAMIN D 25-HYDROXY: 11.8 NG/ML
VLDLC SERPL CALC-MCNC: 18 MG/DL
WBC # BLD: 3.3 K/UL (ref 4–11)

## 2022-04-15 PROCEDURE — 83036 HEMOGLOBIN GLYCOSYLATED A1C: CPT

## 2022-04-15 PROCEDURE — 85652 RBC SED RATE AUTOMATED: CPT

## 2022-04-15 PROCEDURE — 86701 HIV-1ANTIBODY: CPT

## 2022-04-15 PROCEDURE — 82746 ASSAY OF FOLIC ACID SERUM: CPT

## 2022-04-15 PROCEDURE — 82306 VITAMIN D 25 HYDROXY: CPT

## 2022-04-15 PROCEDURE — 80061 LIPID PANEL: CPT

## 2022-04-15 PROCEDURE — 86702 HIV-2 ANTIBODY: CPT

## 2022-04-15 PROCEDURE — 86803 HEPATITIS C AB TEST: CPT

## 2022-04-15 PROCEDURE — 36415 COLL VENOUS BLD VENIPUNCTURE: CPT

## 2022-04-15 PROCEDURE — 80053 COMPREHEN METABOLIC PANEL: CPT

## 2022-04-15 PROCEDURE — 82607 VITAMIN B-12: CPT

## 2022-04-15 PROCEDURE — 84443 ASSAY THYROID STIM HORMONE: CPT

## 2022-04-15 PROCEDURE — 71046 X-RAY EXAM CHEST 2 VIEWS: CPT

## 2022-04-15 PROCEDURE — 86696 HERPES SIMPLEX TYPE 2 TEST: CPT

## 2022-04-15 PROCEDURE — 87340 HEPATITIS B SURFACE AG IA: CPT

## 2022-04-15 PROCEDURE — 87390 HIV-1 AG IA: CPT

## 2022-04-15 PROCEDURE — 85025 COMPLETE CBC W/AUTO DIFF WBC: CPT

## 2022-04-16 LAB
ESTIMATED AVERAGE GLUCOSE: 71 MG/DL
HBA1C MFR BLD: 4.1 %
HEPATITIS B SURFACE ANTIGEN INTERPRETATION: NORMAL
HEPATITIS C ANTIBODY INTERPRETATION: REACTIVE
HIV AG/AB: NORMAL
HIV ANTIGEN: NORMAL
HIV-1 ANTIBODY: NORMAL
HIV-2 AB: NORMAL

## 2022-04-18 LAB — HSV 2 GLYCOPROTEIN G AB IGG: 3.49 IV

## 2022-04-22 ENCOUNTER — HOSPITAL ENCOUNTER (OUTPATIENT)
Dept: PULMONOLOGY | Age: 37
Discharge: HOME | End: 2022-04-22
Payer: COMMERCIAL

## 2022-04-22 DIAGNOSIS — R05.9 COUGH: ICD-10-CM

## 2022-04-22 LAB
DLCO %PRED: 112 %
DLCO PRED: NORMAL
DLCO/VA %PRED: NORMAL
DLCO/VA PRED: NORMAL
DLCO/VA: NORMAL
DLCO: NORMAL
EXPIRATORY TIME-POST: NORMAL
EXPIRATORY TIME: NORMAL
FEF 25-75% %CHNG: NORMAL
FEF 25-75% %PRED-POST: NORMAL
FEF 25-75% %PRED-PRE: NORMAL
FEF 25-75% PRED: NORMAL
FEF 25-75%-POST: NORMAL
FEF 25-75%-PRE: NORMAL
FEV1 %PRED-POST: 92 %
FEV1 %PRED-PRE: 91 %
FEV1 PRED: NORMAL
FEV1-POST: NORMAL
FEV1-PRE: NORMAL
FEV1/FVC %PRED-POST: NORMAL
FEV1/FVC %PRED-PRE: NORMAL
FEV1/FVC PRED: NORMAL
FEV1/FVC-POST: 103 %
FEV1/FVC-PRE: 101 %
FVC %PRED-POST: NORMAL
FVC %PRED-PRE: NORMAL
FVC PRED: NORMAL
FVC-POST: NORMAL
FVC-PRE: NORMAL
GAW %PRED: NORMAL
GAW PRED: NORMAL
GAW: NORMAL
IC %PRED: NORMAL
IC PRED: NORMAL
IC: NORMAL
MEP: NORMAL
MIP: NORMAL
MVV %PRED-PRE: NORMAL
MVV PRED: NORMAL
MVV-PRE: NORMAL
PEF %PRED-POST: NORMAL
PEF %PRED-PRE: NORMAL
PEF PRED: NORMAL
PEF%CHNG: NORMAL
PEF-POST: NORMAL
PEF-PRE: NORMAL
RAW %PRED: NORMAL
RAW PRED: NORMAL
RAW: NORMAL
RV %PRED: NORMAL
RV PRED: NORMAL
RV: NORMAL
SVC %PRED: NORMAL
SVC PRED: NORMAL
SVC: NORMAL
TLC %PRED: 90 %
TLC PRED: NORMAL
TLC: NORMAL
VA %PRED: NORMAL
VA PRED: NORMAL
VA: NORMAL
VTG %PRED: NORMAL
VTG PRED: NORMAL
VTG: NORMAL

## 2022-04-22 PROCEDURE — 6370000000 HC RX 637 (ALT 250 FOR IP): Performed by: INTERNAL MEDICINE

## 2022-04-22 PROCEDURE — 94729 DIFFUSING CAPACITY: CPT

## 2022-04-22 PROCEDURE — 94640 AIRWAY INHALATION TREATMENT: CPT

## 2022-04-22 PROCEDURE — 94060 EVALUATION OF WHEEZING: CPT

## 2022-04-22 PROCEDURE — 94726 PLETHYSMOGRAPHY LUNG VOLUMES: CPT

## 2022-04-22 RX ORDER — ALBUTEROL SULFATE 90 UG/1
4 AEROSOL, METERED RESPIRATORY (INHALATION) ONCE
Status: COMPLETED | OUTPATIENT
Start: 2022-04-22 | End: 2022-04-22

## 2022-04-22 RX ADMIN — ALBUTEROL SULFATE 4 PUFF: 90 AEROSOL, METERED RESPIRATORY (INHALATION) at 10:51

## 2022-04-22 ASSESSMENT — PULMONARY FUNCTION TESTS
FEV1_PERCENT_PREDICTED_PRE: 91
FEV1/FVC_PRE: 101
FEV1/FVC_POST: 103
FEV1_PERCENT_PREDICTED_POST: 92

## 2022-04-23 PROCEDURE — 94729 DIFFUSING CAPACITY: CPT | Performed by: INTERNAL MEDICINE

## 2022-04-23 PROCEDURE — 94726 PLETHYSMOGRAPHY LUNG VOLUMES: CPT | Performed by: INTERNAL MEDICINE

## 2022-04-23 PROCEDURE — 94060 EVALUATION OF WHEEZING: CPT | Performed by: INTERNAL MEDICINE

## 2022-04-23 NOTE — PROCEDURES
spirometry was acceptable and reproducible by ATS standards      Spirometry/Flow volume loop:  Active airflow obstruction no statistically significant postbronchodilator response. Lung volumes: There is mild to moderate restrictive lung defect male extraparenchymal likely related to patient's body habitus    Diffusing capacity:  Normal DLCO. Impression:  No active airflow obstruction, there is mild to moderate restrictive lung defect in a pattern consistent mainly with extraparenchymal restriction likely related to patient's body habitus    FEV1 %Pred-Post   Date Value Ref Range Status   04/22/2022 92 % Final     FEV1/FVC-Post   Date Value Ref Range Status   04/22/2022 103 % Final     TLC %Pred   Date Value Ref Range Status   04/22/2022 90 % Final           OBSTRUCTION % Predicted FEV1   MILD >70%   MODERATE 60-69%   MODERATELY-SEVERE 50-59%   SEVERE 35-49%   VERY SEVERE <35%         RESTRICTION % Predicted TLC   MILD 66-80%   MODERATE 54-65%   MODERATELY-SEVERE <54%                 DIFFUSION CAPACITY DLCO % Pred   MILD >60% AND < LLN   MODERATE 40-60%   SEVERE <40%       PFT data will be scanned into the media tab under this encounter. Please see the scanned data for numerical values.      Alvaro Medina MD  [facility] Pulmonary, Sleep and Critical Care Medicine

## 2022-05-27 ENCOUNTER — APPOINTMENT (OUTPATIENT)
Dept: ULTRASOUND IMAGING | Age: 37
End: 2022-05-27
Payer: COMMERCIAL

## 2022-05-27 ENCOUNTER — HOSPITAL ENCOUNTER (OUTPATIENT)
Dept: ULTRASOUND IMAGING | Age: 37
Discharge: HOME | End: 2022-05-27
Payer: COMMERCIAL

## 2022-05-27 DIAGNOSIS — B19.20 HEPATITIS C VIRUS INFECTION WITHOUT HEPATIC COMA, UNSPECIFIED CHRONICITY: ICD-10-CM

## 2022-05-27 PROCEDURE — 76705 ECHO EXAM OF ABDOMEN: CPT

## 2023-05-28 ENCOUNTER — HOSPITAL ENCOUNTER (INPATIENT)
Age: 38
LOS: 2 days | Discharge: HOME | DRG: 282 | End: 2023-05-31
Attending: EMERGENCY MEDICINE | Admitting: SPECIALIST
Payer: COMMERCIAL

## 2023-05-28 ENCOUNTER — APPOINTMENT (OUTPATIENT)
Dept: CT IMAGING | Age: 38
DRG: 282 | End: 2023-05-28
Payer: COMMERCIAL

## 2023-05-28 DIAGNOSIS — K81.0 ACUTE CHOLECYSTITIS: Primary | ICD-10-CM

## 2023-05-28 LAB
ALBUMIN SERPL-MCNC: 3.7 G/DL (ref 3.4–5)
ALP SERPL-CCNC: 90 U/L (ref 40–129)
ALT SERPL-CCNC: 60 U/L (ref 10–40)
AMMONIA PLAS-SCNC: 60 UMOL/L (ref 16–60)
ANION GAP SERPL CALCULATED.3IONS-SCNC: 11 MMOL/L (ref 3–16)
AST SERPL-CCNC: 147 U/L (ref 15–37)
BASOPHILS # BLD: 0 K/UL (ref 0–0.2)
BASOPHILS NFR BLD: 0.4 %
BILIRUB DIRECT SERPL-MCNC: 0.8 MG/DL (ref 0–0.3)
BILIRUB INDIRECT SERPL-MCNC: 1.6 MG/DL (ref 0–1)
BILIRUB SERPL-MCNC: 2.4 MG/DL (ref 0–1)
BUN SERPL-MCNC: 6 MG/DL (ref 7–20)
CALCIUM SERPL-MCNC: 8.3 MG/DL (ref 8.3–10.6)
CHLORIDE SERPL-SCNC: 101 MMOL/L (ref 99–110)
CO2 SERPL-SCNC: 24 MMOL/L (ref 21–32)
CREAT SERPL-MCNC: <0.5 MG/DL (ref 0.9–1.3)
DEPRECATED RDW RBC AUTO: 14.1 % (ref 12.4–15.4)
EOSINOPHIL # BLD: 0.1 K/UL (ref 0–0.6)
EOSINOPHIL NFR BLD: 1.4 %
ETHANOLAMINE SERPL-MCNC: 93 MG/DL (ref 0–0.08)
GFR SERPLBLD CREATININE-BSD FMLA CKD-EPI: >60 ML/MIN/{1.73_M2}
GLUCOSE SERPL-MCNC: 132 MG/DL (ref 70–99)
HCT VFR BLD AUTO: 31.8 % (ref 40.5–52.5)
HGB BLD-MCNC: 11 G/DL (ref 13.5–17.5)
INR PPP: 1.48 (ref 0.84–1.16)
LIPASE SERPL-CCNC: 29 U/L (ref 13–60)
LYMPHOCYTES # BLD: 0.9 K/UL (ref 1–5.1)
LYMPHOCYTES NFR BLD: 23.7 %
MAGNESIUM SERPL-MCNC: 1.7 MG/DL (ref 1.8–2.4)
MCH RBC QN AUTO: 33.4 PG (ref 26–34)
MCHC RBC AUTO-ENTMCNC: 34.5 G/DL (ref 31–36)
MCV RBC AUTO: 96.8 FL (ref 80–100)
MONOCYTES # BLD: 0.4 K/UL (ref 0–1.3)
MONOCYTES NFR BLD: 11.2 %
NEUTROPHILS # BLD: 2.4 K/UL (ref 1.7–7.7)
NEUTROPHILS NFR BLD: 63.3 %
PLATELET # BLD AUTO: 83 K/UL (ref 135–450)
PLATELET BLD QL SMEAR: ABNORMAL
PMV BLD AUTO: 9.4 FL (ref 5–10.5)
POTASSIUM SERPL-SCNC: 3.4 MMOL/L (ref 3.5–5.1)
PROT SERPL-MCNC: 7.2 G/DL (ref 6.4–8.2)
PROTHROMBIN TIME: 17.9 SEC (ref 11.5–14.8)
RBC # BLD AUTO: 3.28 M/UL (ref 4.2–5.9)
SLIDE REVIEW: ABNORMAL
SODIUM SERPL-SCNC: 136 MMOL/L (ref 136–145)
WBC # BLD AUTO: 3.8 K/UL (ref 4–11)

## 2023-05-28 PROCEDURE — 80307 DRUG TEST PRSMV CHEM ANLYZR: CPT

## 2023-05-28 PROCEDURE — 83690 ASSAY OF LIPASE: CPT

## 2023-05-28 PROCEDURE — 85610 PROTHROMBIN TIME: CPT

## 2023-05-28 PROCEDURE — 80076 HEPATIC FUNCTION PANEL: CPT

## 2023-05-28 PROCEDURE — 99285 EMERGENCY DEPT VISIT HI MDM: CPT

## 2023-05-28 PROCEDURE — 6360000004 HC RX CONTRAST MEDICATION: Performed by: EMERGENCY MEDICINE

## 2023-05-28 PROCEDURE — 80048 BASIC METABOLIC PNL TOTAL CA: CPT

## 2023-05-28 PROCEDURE — 83735 ASSAY OF MAGNESIUM: CPT

## 2023-05-28 PROCEDURE — 6370000000 HC RX 637 (ALT 250 FOR IP): Performed by: EMERGENCY MEDICINE

## 2023-05-28 PROCEDURE — 82077 ASSAY SPEC XCP UR&BREATH IA: CPT

## 2023-05-28 PROCEDURE — 74177 CT ABD & PELVIS W/CONTRAST: CPT

## 2023-05-28 PROCEDURE — 85025 COMPLETE CBC W/AUTO DIFF WBC: CPT

## 2023-05-28 PROCEDURE — 82140 ASSAY OF AMMONIA: CPT

## 2023-05-28 RX ORDER — PREDNISONE 20 MG/1
60 TABLET ORAL ONCE
Status: COMPLETED | OUTPATIENT
Start: 2023-05-28 | End: 2023-05-28

## 2023-05-28 RX ORDER — DIPHENHYDRAMINE HCL 25 MG
25 TABLET ORAL ONCE
Status: COMPLETED | OUTPATIENT
Start: 2023-05-28 | End: 2023-05-28

## 2023-05-28 RX ADMIN — DIPHENHYDRAMINE HCL 25 MG: 25 TABLET ORAL at 23:08

## 2023-05-28 RX ADMIN — PREDNISONE 60 MG: 20 TABLET ORAL at 23:08

## 2023-05-28 RX ADMIN — IOPAMIDOL 75 ML: 755 INJECTION, SOLUTION INTRAVENOUS at 23:52

## 2023-05-29 PROBLEM — K81.0 ACUTE CHOLECYSTITIS: Status: ACTIVE | Noted: 2023-05-29

## 2023-05-29 LAB
AMPHETAMINES UR QL SCN>1000 NG/ML: NORMAL
BARBITURATES UR QL SCN>200 NG/ML: NORMAL
BENZODIAZ UR QL SCN>200 NG/ML: NORMAL
CANNABINOIDS UR QL SCN>50 NG/ML: NORMAL
COCAINE UR QL SCN: NORMAL
DRUG SCREEN COMMENT UR-IMP: NORMAL
FENTANYL SCREEN, URINE: NORMAL
METHADONE UR QL SCN>300 NG/ML: NORMAL
OPIATES UR QL SCN>300 NG/ML: NORMAL
OXYCODONE UR QL SCN: NORMAL
PCP UR QL SCN>25 NG/ML: NORMAL
PH UR STRIP: 7.5 [PH]

## 2023-05-29 PROCEDURE — 6360000002 HC RX W HCPCS: Performed by: SPECIALIST

## 2023-05-29 PROCEDURE — 6370000000 HC RX 637 (ALT 250 FOR IP): Performed by: SPECIALIST

## 2023-05-29 PROCEDURE — 99254 IP/OBS CNSLTJ NEW/EST MOD 60: CPT | Performed by: SURGERY

## 2023-05-29 PROCEDURE — 94760 N-INVAS EAR/PLS OXIMETRY 1: CPT

## 2023-05-29 PROCEDURE — 2500000003 HC RX 250 WO HCPCS: Performed by: SPECIALIST

## 2023-05-29 PROCEDURE — 6360000002 HC RX W HCPCS: Performed by: EMERGENCY MEDICINE

## 2023-05-29 PROCEDURE — 2580000003 HC RX 258: Performed by: SPECIALIST

## 2023-05-29 PROCEDURE — 1200000000 HC SEMI PRIVATE

## 2023-05-29 RX ORDER — LORAZEPAM 1 MG/1
1 TABLET ORAL EVERY 4 HOURS PRN
Status: DISCONTINUED | OUTPATIENT
Start: 2023-05-29 | End: 2023-05-29

## 2023-05-29 RX ORDER — SODIUM CHLORIDE 0.9 % (FLUSH) 0.9 %
5-40 SYRINGE (ML) INJECTION PRN
Status: DISCONTINUED | OUTPATIENT
Start: 2023-05-29 | End: 2023-05-31 | Stop reason: HOSPADM

## 2023-05-29 RX ORDER — FOLIC ACID 1 MG/1
1 TABLET ORAL DAILY
Status: DISCONTINUED | OUTPATIENT
Start: 2023-05-29 | End: 2023-05-31 | Stop reason: HOSPADM

## 2023-05-29 RX ORDER — LORAZEPAM 2 MG/ML
2 INJECTION INTRAMUSCULAR
Status: DISCONTINUED | OUTPATIENT
Start: 2023-05-29 | End: 2023-05-29

## 2023-05-29 RX ORDER — DEXTROSE AND SODIUM CHLORIDE 5; .9 G/100ML; G/100ML
INJECTION, SOLUTION INTRAVENOUS CONTINUOUS
Status: DISCONTINUED | OUTPATIENT
Start: 2023-05-29 | End: 2023-05-31 | Stop reason: HOSPADM

## 2023-05-29 RX ORDER — LORAZEPAM 0.5 MG/1
0.5 TABLET ORAL EVERY 4 HOURS PRN
Status: DISCONTINUED | OUTPATIENT
Start: 2023-05-29 | End: 2023-05-29

## 2023-05-29 RX ORDER — LORAZEPAM 1 MG/1
3 TABLET ORAL
Status: DISCONTINUED | OUTPATIENT
Start: 2023-05-29 | End: 2023-05-31 | Stop reason: HOSPADM

## 2023-05-29 RX ORDER — LORAZEPAM 2 MG/ML
1 INJECTION INTRAMUSCULAR EVERY 4 HOURS PRN
Status: DISCONTINUED | OUTPATIENT
Start: 2023-05-29 | End: 2023-05-29

## 2023-05-29 RX ORDER — LORAZEPAM 1 MG/1
1 TABLET ORAL
Status: DISCONTINUED | OUTPATIENT
Start: 2023-05-29 | End: 2023-05-31 | Stop reason: HOSPADM

## 2023-05-29 RX ORDER — CIPROFLOXACIN 2 MG/ML
400 INJECTION, SOLUTION INTRAVENOUS ONCE
Status: DISCONTINUED | OUTPATIENT
Start: 2023-05-29 | End: 2023-05-29 | Stop reason: SDUPTHER

## 2023-05-29 RX ORDER — METRONIDAZOLE 500 MG/100ML
500 INJECTION, SOLUTION INTRAVENOUS EVERY 8 HOURS
Status: DISCONTINUED | OUTPATIENT
Start: 2023-05-29 | End: 2023-05-30

## 2023-05-29 RX ORDER — BUPRENORPHINE HYDROCHLORIDE AND NALOXONE HYDROCHLORIDE DIHYDRATE 8; 2 MG/1; MG/1
1 TABLET SUBLINGUAL 2 TIMES DAILY
COMMUNITY

## 2023-05-29 RX ORDER — POLYETHYLENE GLYCOL 3350 17 G
2 POWDER IN PACKET (EA) ORAL
Status: DISCONTINUED | OUTPATIENT
Start: 2023-05-29 | End: 2023-05-31 | Stop reason: HOSPADM

## 2023-05-29 RX ORDER — LORAZEPAM 2 MG/ML
3 INJECTION INTRAMUSCULAR
Status: DISCONTINUED | OUTPATIENT
Start: 2023-05-29 | End: 2023-05-29

## 2023-05-29 RX ORDER — LORAZEPAM 2 MG/ML
1 INJECTION INTRAMUSCULAR
Status: DISCONTINUED | OUTPATIENT
Start: 2023-05-29 | End: 2023-05-31 | Stop reason: HOSPADM

## 2023-05-29 RX ORDER — PANTOPRAZOLE SODIUM 40 MG/1
40 TABLET, DELAYED RELEASE ORAL
Status: DISCONTINUED | OUTPATIENT
Start: 2023-05-29 | End: 2023-05-31 | Stop reason: HOSPADM

## 2023-05-29 RX ORDER — CIPROFLOXACIN 2 MG/ML
400 INJECTION, SOLUTION INTRAVENOUS EVERY 12 HOURS
Status: DISCONTINUED | OUTPATIENT
Start: 2023-05-29 | End: 2023-05-29 | Stop reason: SDUPTHER

## 2023-05-29 RX ORDER — LORAZEPAM 2 MG/ML
4 INJECTION INTRAMUSCULAR
Status: DISCONTINUED | OUTPATIENT
Start: 2023-05-29 | End: 2023-05-29

## 2023-05-29 RX ORDER — PRAZOSIN HYDROCHLORIDE 1 MG/1
2 CAPSULE ORAL NIGHTLY
Status: DISCONTINUED | OUTPATIENT
Start: 2023-05-29 | End: 2023-05-29

## 2023-05-29 RX ORDER — GABAPENTIN 300 MG/1
600 CAPSULE ORAL 2 TIMES DAILY
Status: DISCONTINUED | OUTPATIENT
Start: 2023-05-29 | End: 2023-05-31 | Stop reason: HOSPADM

## 2023-05-29 RX ORDER — LORAZEPAM 2 MG/ML
3 INJECTION INTRAMUSCULAR
Status: DISCONTINUED | OUTPATIENT
Start: 2023-05-29 | End: 2023-05-31 | Stop reason: HOSPADM

## 2023-05-29 RX ORDER — LORAZEPAM 1 MG/1
3 TABLET ORAL
Status: DISCONTINUED | OUTPATIENT
Start: 2023-05-29 | End: 2023-05-29

## 2023-05-29 RX ORDER — LORAZEPAM 2 MG/ML
4 INJECTION INTRAMUSCULAR
Status: DISCONTINUED | OUTPATIENT
Start: 2023-05-29 | End: 2023-05-31 | Stop reason: HOSPADM

## 2023-05-29 RX ORDER — SODIUM CHLORIDE 0.9 % (FLUSH) 0.9 %
5-40 SYRINGE (ML) INJECTION EVERY 12 HOURS SCHEDULED
Status: DISCONTINUED | OUTPATIENT
Start: 2023-05-29 | End: 2023-05-31 | Stop reason: HOSPADM

## 2023-05-29 RX ORDER — LORAZEPAM 1 MG/1
4 TABLET ORAL
Status: DISCONTINUED | OUTPATIENT
Start: 2023-05-29 | End: 2023-05-31 | Stop reason: HOSPADM

## 2023-05-29 RX ORDER — LORAZEPAM 1 MG/1
2 TABLET ORAL
Status: DISCONTINUED | OUTPATIENT
Start: 2023-05-29 | End: 2023-05-29

## 2023-05-29 RX ORDER — LORAZEPAM 1 MG/1
2 TABLET ORAL
Status: DISCONTINUED | OUTPATIENT
Start: 2023-05-29 | End: 2023-05-31 | Stop reason: HOSPADM

## 2023-05-29 RX ORDER — BUPRENORPHINE AND NALOXONE 8; 2 MG/1; MG/1
1 FILM, SOLUBLE BUCCAL; SUBLINGUAL 2 TIMES DAILY
Status: DISCONTINUED | OUTPATIENT
Start: 2023-05-29 | End: 2023-05-31 | Stop reason: HOSPADM

## 2023-05-29 RX ORDER — LORAZEPAM 2 MG/ML
2 INJECTION INTRAMUSCULAR
Status: DISCONTINUED | OUTPATIENT
Start: 2023-05-29 | End: 2023-05-31 | Stop reason: HOSPADM

## 2023-05-29 RX ORDER — CIPROFLOXACIN 2 MG/ML
400 INJECTION, SOLUTION INTRAVENOUS EVERY 12 HOURS
Status: DISCONTINUED | OUTPATIENT
Start: 2023-05-29 | End: 2023-05-30

## 2023-05-29 RX ORDER — SODIUM CHLORIDE 9 MG/ML
INJECTION, SOLUTION INTRAVENOUS PRN
Status: DISCONTINUED | OUTPATIENT
Start: 2023-05-29 | End: 2023-05-31 | Stop reason: HOSPADM

## 2023-05-29 RX ORDER — LORAZEPAM 2 MG/ML
0.5 INJECTION INTRAMUSCULAR EVERY 4 HOURS PRN
Status: DISCONTINUED | OUTPATIENT
Start: 2023-05-29 | End: 2023-05-29

## 2023-05-29 RX ORDER — METRONIDAZOLE 500 MG/100ML
500 INJECTION, SOLUTION INTRAVENOUS ONCE
Status: DISCONTINUED | OUTPATIENT
Start: 2023-05-29 | End: 2023-05-29 | Stop reason: SDUPTHER

## 2023-05-29 RX ORDER — LORAZEPAM 1 MG/1
4 TABLET ORAL
Status: DISCONTINUED | OUTPATIENT
Start: 2023-05-29 | End: 2023-05-29

## 2023-05-29 RX ADMIN — DEXTROSE AND SODIUM CHLORIDE: 5; 900 INJECTION, SOLUTION INTRAVENOUS at 05:23

## 2023-05-29 RX ADMIN — FOLIC ACID 1 MG: 1 TABLET ORAL at 13:01

## 2023-05-29 RX ADMIN — METRONIDAZOLE 500 MG: 500 INJECTION, SOLUTION INTRAVENOUS at 09:04

## 2023-05-29 RX ADMIN — THIAMINE HYDROCHLORIDE 100 MG: 100 INJECTION, SOLUTION INTRAMUSCULAR; INTRAVENOUS at 14:25

## 2023-05-29 RX ADMIN — GABAPENTIN 600 MG: 300 CAPSULE ORAL at 09:01

## 2023-05-29 RX ADMIN — Medication 2 MG: at 15:48

## 2023-05-29 RX ADMIN — PANTOPRAZOLE SODIUM 40 MG: 40 TABLET, DELAYED RELEASE ORAL at 07:29

## 2023-05-29 RX ADMIN — GABAPENTIN 600 MG: 300 CAPSULE ORAL at 02:20

## 2023-05-29 RX ADMIN — BUPRENORPHINE AND NALOXONE 1 FILM: 8; 2 FILM BUCCAL; SUBLINGUAL at 14:23

## 2023-05-29 RX ADMIN — BUPRENORPHINE AND NALOXONE 1 FILM: 8; 2 FILM BUCCAL; SUBLINGUAL at 20:27

## 2023-05-29 RX ADMIN — CIPROFLOXACIN 400 MG: 400 INJECTION, SOLUTION INTRAVENOUS at 01:09

## 2023-05-29 RX ADMIN — CIPROFLOXACIN 400 MG: 400 INJECTION, SOLUTION INTRAVENOUS at 13:04

## 2023-05-29 RX ADMIN — METRONIDAZOLE 500 MG: 500 INJECTION, SOLUTION INTRAVENOUS at 18:18

## 2023-05-29 RX ADMIN — METRONIDAZOLE 500 MG: 500 INJECTION, SOLUTION INTRAVENOUS at 02:22

## 2023-05-29 RX ADMIN — PRAZOSIN HYDROCHLORIDE 2 MG: 1 CAPSULE ORAL at 02:20

## 2023-05-29 RX ADMIN — GABAPENTIN 600 MG: 300 CAPSULE ORAL at 20:27

## 2023-05-29 ASSESSMENT — PAIN DESCRIPTION - LOCATION: LOCATION: ABDOMEN

## 2023-05-29 ASSESSMENT — PAIN SCALES - GENERAL
PAINLEVEL_OUTOF10: 0
PAINLEVEL_OUTOF10: 7

## 2023-05-29 NOTE — PROGRESS NOTES
Patient admitted to room 4265 for cholecystitis and swollen jaw. Patient is A&O x4. BP is 149/84. All other vitals are stable. Patient has oriented to room and has call light and bedside table within reach. Patient is resting in bed at this moment.

## 2023-05-29 NOTE — PROGRESS NOTES
ED SBAR report provider to [facility]. Patient to be transported to Room 4265 via stretcher by ED tech. Patient transported with bedside cardiac monitor and with IV medications infusing. IV site clean, dry, and intact. MEWS score and pain assessed as 6/10 and documented. Updated patient and family on plan of care.

## 2023-05-29 NOTE — PROGRESS NOTES
4 Eyes Skin Assessment     NAME:  Lara Andrews  YOB: 1985  MEDICAL RECORD NUMBER:  [id number]    The patient is being assessed for  Admission    I agree that at least one RN has performed a thorough Head to Toe Skin Assessment on the patient. ALL assessment sites listed below have been assessed. Areas assessed by both nurses:    Head, Face, Ears, Shoulders, Back, Chest, Arms, Elbows, Hands, Sacrum. Buttock, Coccyx, Ischium, and Legs. Feet and Heels        Does the Patient have a Wound?  No noted wound(s)       Wing Prevention initiated by RN: No  Wound Care Orders initiated by RN: No    Pressure Injury (Stage 3,4, Unstageable, DTI, NWPT, and Complex wounds) if present, place Wound referral order by RN under : No    New Ostomies, if present place, Ostomy referral order under : No     Nurse 1 eSignature: Electronically signed by Chapincito Lozano RN on 5/29/23 at 6:32 AM EDT    **SHARE this note so that the co-signing nurse can place an eSignature**    Nurse 2 eSignature: Electronically signed by Haile Han RN on 5/29/23 at 6:40 AM EDT

## 2023-05-29 NOTE — ED PROVIDER NOTES
Magnesium 1.70 (*)     All other components within normal limits   LIPASE   ETHANOL   AMMONIA   URINE DRUG SCREEN       All other labs were withinnormal range or not returned as of this dictation. EMERGENCY DEPARTMENT COURSE and DIFFERENTIAL DIAGNOSIS/MDM:     PMH, Surgical Hx, FH, Social Hx reviewed by myself (ETOH usage, Tobacco usage, Drug usage reviewed by myself, no pertinent Hx)- No Pertinent Hx     Old records were reviewed by me     MDM 41-year-old with right upper quadrant abdominal pain. Found have evidence of acute cholecystitis. Antibiotics initiated. Admission to the hospitalist.  General surgery to see in the a.m. Inpatient admission. Disposition- Considered - Admission    I PERSONALLY SAW THE PATIENT AND PERFORMED A SUBSTANTIVE PORTION OF THE VISIT INCLUDING ALL ASPECTS OF THE MEDICAL DECISION MAKING PROCESS. The primary clinician of record [facility] [employer]   Total Critical Caretime was 49 minutes, excluding separately reportable procedures. There was a high probability of clinically significant/life threatening deterioration in the patient's condition which required my urgent intervention. CRITICAL CARE  I personally saw the patient and independently provided 49 minutes of non-concurrent critical care out of the total shared critical care time provided. This excludes seperately billable procedures. Critical care time was provided for patient as above that required close evaluation and/or intervention with concern for potential patient decompensation. PROCEDURES:  Unlessotherwise noted below, none    FINAL IMPRESSION      1. Acute cholecystitis          DISPOSITION/PLAN   DISPOSITION Admitted 05/29/2023 12:39:53 AM    PATIENT REFERRED TO:  No follow-up provider specified.     DISCHARGE MEDICATIONS:  New Prescriptions    No medications on file          (Please note that portions ofthis note were completed with a voice recognition program.  Efforts were made to
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
## 2023-05-29 NOTE — ED TRIAGE NOTES
Lara Andrews is a 41 y.o. male brought himself to the ER for eval of facial swelling and abd pain that started today. The patient states\" that this happens when his liver or pancreas acts up. The patient is alert and oriented with an open and patent airway.

## 2023-05-29 NOTE — PROGRESS NOTES
Medication Reconciliation    List of medications patient is currently taking is complete. Source of information: 1. Conversation with patient at bedside                                      2. EPIC records      Allergies  Codeine     Notes regarding home medications:   1. Added suboxone 8-2 mg - 1 tablet BID  2. Removed cetirizine, flonase, and prazosin.

## 2023-05-29 NOTE — PROGRESS NOTES
Patient alert and oriented x4, VSS, laying in bed. Patient denies n/v, diarrhea, SOB, pain at this time. Patient NPO for possible testing, IV fluid infusing as ordered. Patient states no needs. Bed in lowest and locked position, non-slip socks on. Patient UAL and tolerating well, call light in reach.
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
## 2023-05-30 ENCOUNTER — APPOINTMENT (OUTPATIENT)
Dept: NUCLEAR MEDICINE | Age: 38
DRG: 282 | End: 2023-05-30
Payer: COMMERCIAL

## 2023-05-30 LAB
ALBUMIN SERPL-MCNC: 3.7 G/DL (ref 3.4–5)
ALBUMIN/GLOB SERPL: 1.2 {RATIO} (ref 1.1–2.2)
ALP SERPL-CCNC: 79 U/L (ref 40–129)
ALT SERPL-CCNC: 53 U/L (ref 10–40)
ANION GAP SERPL CALCULATED.3IONS-SCNC: 8 MMOL/L (ref 3–16)
AST SERPL-CCNC: 123 U/L (ref 15–37)
BILIRUB SERPL-MCNC: 2.1 MG/DL (ref 0–1)
BUN SERPL-MCNC: 7 MG/DL (ref 7–20)
CALCIUM SERPL-MCNC: 7.9 MG/DL (ref 8.3–10.6)
CHLORIDE SERPL-SCNC: 106 MMOL/L (ref 99–110)
CO2 SERPL-SCNC: 24 MMOL/L (ref 21–32)
CREAT SERPL-MCNC: 0.5 MG/DL (ref 0.9–1.3)
DEPRECATED RDW RBC AUTO: 14.4 % (ref 12.4–15.4)
GFR SERPLBLD CREATININE-BSD FMLA CKD-EPI: >60 ML/MIN/{1.73_M2}
GLUCOSE SERPL-MCNC: 110 MG/DL (ref 70–99)
HCT VFR BLD AUTO: 30 % (ref 40.5–52.5)
HGB BLD-MCNC: 10.4 G/DL (ref 13.5–17.5)
MCH RBC QN AUTO: 33.9 PG (ref 26–34)
MCHC RBC AUTO-ENTMCNC: 34.6 G/DL (ref 31–36)
MCV RBC AUTO: 97.9 FL (ref 80–100)
PLATELET # BLD AUTO: 63 K/UL (ref 135–450)
PMV BLD AUTO: 9.8 FL (ref 5–10.5)
POTASSIUM SERPL-SCNC: 3.3 MMOL/L (ref 3.5–5.1)
PROT SERPL-MCNC: 6.8 G/DL (ref 6.4–8.2)
RBC # BLD AUTO: 3.06 M/UL (ref 4.2–5.9)
SODIUM SERPL-SCNC: 138 MMOL/L (ref 136–145)
WBC # BLD AUTO: 2.8 K/UL (ref 4–11)

## 2023-05-30 PROCEDURE — 6360000002 HC RX W HCPCS: Performed by: SPECIALIST

## 2023-05-30 PROCEDURE — 3430000000 HC RX DIAGNOSTIC RADIOPHARMACEUTICAL: Performed by: SURGERY

## 2023-05-30 PROCEDURE — 6370000000 HC RX 637 (ALT 250 FOR IP): Performed by: INTERNAL MEDICINE

## 2023-05-30 PROCEDURE — 6360000002 HC RX W HCPCS: Performed by: INTERNAL MEDICINE

## 2023-05-30 PROCEDURE — 2500000003 HC RX 250 WO HCPCS: Performed by: SPECIALIST

## 2023-05-30 PROCEDURE — 99232 SBSQ HOSP IP/OBS MODERATE 35: CPT | Performed by: INTERNAL MEDICINE

## 2023-05-30 PROCEDURE — 6370000000 HC RX 637 (ALT 250 FOR IP): Performed by: SPECIALIST

## 2023-05-30 PROCEDURE — 94760 N-INVAS EAR/PLS OXIMETRY 1: CPT

## 2023-05-30 PROCEDURE — 2580000003 HC RX 258: Performed by: SPECIALIST

## 2023-05-30 PROCEDURE — 85027 COMPLETE CBC AUTOMATED: CPT

## 2023-05-30 PROCEDURE — APPSS15 APP SPLIT SHARED TIME 0-15 MINUTES: Performed by: PHYSICIAN ASSISTANT

## 2023-05-30 PROCEDURE — 80053 COMPREHEN METABOLIC PANEL: CPT

## 2023-05-30 PROCEDURE — 36415 COLL VENOUS BLD VENIPUNCTURE: CPT

## 2023-05-30 PROCEDURE — APPNB15 APP NON BILLABLE TIME 0-15 MINS: Performed by: PHYSICIAN ASSISTANT

## 2023-05-30 PROCEDURE — 99232 SBSQ HOSP IP/OBS MODERATE 35: CPT | Performed by: SURGERY

## 2023-05-30 PROCEDURE — A9537 TC99M MEBROFENIN: HCPCS | Performed by: SURGERY

## 2023-05-30 PROCEDURE — 1200000000 HC SEMI PRIVATE

## 2023-05-30 PROCEDURE — 78226 HEPATOBILIARY SYSTEM IMAGING: CPT

## 2023-05-30 RX ORDER — ONDANSETRON 2 MG/ML
4 INJECTION INTRAMUSCULAR; INTRAVENOUS EVERY 6 HOURS PRN
Status: DISCONTINUED | OUTPATIENT
Start: 2023-05-30 | End: 2023-05-31 | Stop reason: HOSPADM

## 2023-05-30 RX ADMIN — METRONIDAZOLE 500 MG: 500 INJECTION, SOLUTION INTRAVENOUS at 03:11

## 2023-05-30 RX ADMIN — GABAPENTIN 600 MG: 300 CAPSULE ORAL at 09:37

## 2023-05-30 RX ADMIN — DEXTROSE AND SODIUM CHLORIDE: 5; 900 INJECTION, SOLUTION INTRAVENOUS at 06:00

## 2023-05-30 RX ADMIN — BUPRENORPHINE AND NALOXONE 1 FILM: 8; 2 FILM BUCCAL; SUBLINGUAL at 09:37

## 2023-05-30 RX ADMIN — GABAPENTIN 600 MG: 300 CAPSULE ORAL at 21:13

## 2023-05-30 RX ADMIN — ONDANSETRON 4 MG: 2 INJECTION INTRAMUSCULAR; INTRAVENOUS at 16:03

## 2023-05-30 RX ADMIN — METRONIDAZOLE 500 MG: 500 INJECTION, SOLUTION INTRAVENOUS at 09:38

## 2023-05-30 RX ADMIN — BUPRENORPHINE AND NALOXONE 1 FILM: 8; 2 FILM BUCCAL; SUBLINGUAL at 21:13

## 2023-05-30 RX ADMIN — THIAMINE HYDROCHLORIDE 100 MG: 100 INJECTION, SOLUTION INTRAMUSCULAR; INTRAVENOUS at 18:47

## 2023-05-30 RX ADMIN — PANTOPRAZOLE SODIUM 40 MG: 40 TABLET, DELAYED RELEASE ORAL at 06:02

## 2023-05-30 RX ADMIN — DEXTROSE AND SODIUM CHLORIDE: 5; 900 INJECTION, SOLUTION INTRAVENOUS at 20:30

## 2023-05-30 RX ADMIN — CIPROFLOXACIN 400 MG: 400 INJECTION, SOLUTION INTRAVENOUS at 14:27

## 2023-05-30 RX ADMIN — POTASSIUM BICARBONATE 40 MEQ: 782 TABLET, EFFERVESCENT ORAL at 14:25

## 2023-05-30 RX ADMIN — FOLIC ACID 1 MG: 1 TABLET ORAL at 09:37

## 2023-05-30 RX ADMIN — CIPROFLOXACIN 400 MG: 400 INJECTION, SOLUTION INTRAVENOUS at 01:50

## 2023-05-30 RX ADMIN — Medication 6 MILLICURIE: at 10:55

## 2023-05-30 NOTE — CARE COORDINATION
SW consult noted for consideration of rehab. SW will arrive at bedside momentarily to assess and offer resources if he is agreeable. Respectfully submitted,    Emiliana Fink MSW, [facility]  [facility]   [phone]    Electronically signed by MAITE Bergeron, LSW on 5/30/2023 at 9:37 AM

## 2023-05-30 NOTE — PROGRESS NOTES
General and Vascular Surgery                                                           Daily Progress Note                                                             [place] PA-C    Pt Name: Harshal Guillen  Medical Record Number: [id number]  Date of Birth 1985   Today's Date: 5/30/2023    Chief Complaint: Abdominal pain    ASSESSMENT/PLAN  Possible cholecystitis  OK to continue empiric abx for now  Will check HIDA today. If positive then plan for Laparoscopic cholecystectomy with cholangiogram, possible open  LFT's/bilirubin improved. If unremarkable then may need GI to evaluate for EtOH hepatitis and Hep C. Continue PPI  Encouraged complete EtOH and tobacco cessation. Monitor for withdrawals     EDUCATION  Patient educated about their illness/diagnosis, stated above, and all questions answered. We discussed the importance of nutrition, medications they are taking, and healthy lifestyle. Brittney Lawton has slightly improved from yesterday. Pain is well controlled. He has no nausea and no vomiting. He is tolerating NPO. Current activity is ad jonh    OBJECTIVE  VITALS:  height is 5' 9\" (1.753 m) and weight is 201 lb 15.1 oz (91.6 kg). His oral temperature is 98.2 °F (36.8 °C). His blood pressure is 153/92 (abnormal) and his pulse is 77. His respiration is 16 and oxygen saturation is 96%. VITALS:  BP (!) 153/92   Pulse 77   Temp 98.2 °F (36.8 °C) (Oral)   Resp 16   Ht 5' 9\" (1.753 m)   Wt 201 lb 15.1 oz (91.6 kg)   SpO2 96%   BMI 29.82 kg/m²   GENERAL: alert, no distress  LUNGS: clear to ausculation, without wheezes, rales or rhonci  HEART: normal rate and regular rhythm  ABDOMEN: soft, epigastric.  non-distended  EXTREMITY: no cyanosis, clubbing or edema  I/O last 3 completed shifts: In: 3275.3 [P.O.:360; I.V.:1834.6; IV Piggyback:1080.7]  Out: 750 [Urine:750]  No intake/output data recorded.     LABS  Recent Labs

## 2023-05-30 NOTE — PROGRESS NOTES
Patient showed up on my consult list.  Was seen by Dr. Denise Mills in 2019. Will defer consult to Dr. Denise Mills. I notified nurse.   Kimberlee Laguna MD

## 2023-05-30 NOTE — PROGRESS NOTES
Hospitalist Progress Note  5/30/2023 1:26 PM  Subjective:   Admit Date: 5/28/2023  PCP: Shantell Vizcarra MD  Interval History: pt feels better but not completely  Meds are helping  Chart reviewed. Diet: Diet NPO  Medications:   Scheduled Meds:   metroNIDAZOLE  500 mg IntraVENous q8h    gabapentin  600 mg Oral BID    pantoprazole  40 mg Oral QAM AC    ciprofloxacin  400 mg IntraVENous Q12H    thiamine (VITAMIN B1) IVPB  100 mg IntraVENous D66N    folic acid  1 mg Oral Daily    sodium chloride flush  5-40 mL IntraVENous 2 times per day    buprenorphine-naloxone  1 Film SubLINGual BID     Continuous Infusions:   dextrose 5 % and 0.9 % NaCl 100 mL/hr at 05/30/23 0601    sodium chloride       CBC:   Recent Labs     05/28/23 2256 05/30/23  0834   WBC 3.8* 2.8*   HGB 11.0* 10.4*   PLT 83* 63*     BMP:    Recent Labs     05/28/23 2256 05/30/23  0834    138   K 3.4* 3.3*    106   CO2 24 24   BUN 6* 7   CREATININE <0.5* 0.5*   GLUCOSE 132* 110*     Hepatic:   Recent Labs     05/28/23 2256 05/30/23  0834   * 123*   ALT 60* 53*   BILITOT 2.4* 2.1*   ALKPHOS 90 79     Troponin: No results for input(s): TROPONINI in the last 72 hours. BNP: No results for input(s): BNP in the last 72 hours. Lipids: No results for input(s): CHOL, HDL in the last 72 hours. Invalid input(s): LDLCALCU  INR:   Recent Labs     05/28/23 2256   INR 1.48*       Objective:   Vitals: BP (!) 153/92   Pulse 77   Temp 98.2 °F (36.8 °C) (Oral)   Resp 16   Ht 5' 9\" (1.753 m)   Wt 201 lb 15.1 oz (91.6 kg)   SpO2 96%   BMI 29.82 kg/m²   General appearance: alert,awake,oriented x 3 and cooperative with exam  HEENT: Head: Normal, normocephalic, atraumatic, anicteric, pupils are reactive to light. Dry mucous membrane.   Neck: no adenopathy, no carotid bruit, supple, symmetrical, trachea midline and thyroid not enlarged, symmetric, no tenderness/mass/nodules  Lungs: clear to auscultation bilaterally  Heart: regular rate and rhythm,

## 2023-05-30 NOTE — CARE COORDINATION
Case Management Assessment  Initial Evaluation    Date/Time of Evaluation: 5/30/2023 5:10 PM  Assessment Completed by: MAITE Dias, PUMAW    If patient is discharged prior to next notation, then this note serves as note for discharge by case management. Patient Name: Lainey Billing                   YOB: 1985  Diagnosis: Acute cholecystitis [K81.0]                   Date / Time: 5/28/2023 10:16 PM    Patient Admission Status: Inpatient   Readmission Risk (Low < 19, Mod (19-27), High > 27): Readmission Risk Score: 8.8    Current PCP: Hussain Vásquez MD  PCP verified by CM? Yes    Chart Reviewed: Yes      History Provided by: Patient  Patient Orientation: Alert and Oriented    Patient Cognition: Alert    Hospitalization in the last 30 days (Readmission):  No    If yes, Readmission Assessment in  Navigator will be completed. Advance Directives:      Code Status: Full Code   Patient's Primary Decision Maker is: Legal Next of Kin      Discharge Planning:    Patient lives with: Spouse/Significant Other Type of Home: Apartment  Primary Care Giver: Self  Patient Support Systems include: Spouse/Significant Other, Family Members   Current Financial resources: Medicaid  Current community resources: None  Current services prior to admission: None            Current DME:              Type of Home Care services:  None    ADLS  Prior functional level: Independent in ADLs/IADLs  Current functional level: Independent in ADLs/IADLs    PT AM-PAC:   /24  OT AM-PAC:   /24    Family can provide assistance at DC: Yes  Would you like Case Management to discuss the discharge plan with any other family members/significant others, and if so, who? Yes (wife if needed.)  Plans to Return to Present Housing: Yes  Other Identified Issues/Barriers to RETURNING to current housing: None noted at this time. Potential Assistance needed at discharge:  Other (Comment) (alcoholism treatment.)            Potential DME:

## 2023-05-31 VITALS
TEMPERATURE: 98.7 F | BODY MASS INDEX: 30.37 KG/M2 | RESPIRATION RATE: 13 BRPM | HEART RATE: 82 BPM | OXYGEN SATURATION: 94 % | HEIGHT: 69 IN | WEIGHT: 205.03 LBS | DIASTOLIC BLOOD PRESSURE: 91 MMHG | SYSTOLIC BLOOD PRESSURE: 154 MMHG

## 2023-05-31 LAB
ALBUMIN SERPL-MCNC: 3.5 G/DL (ref 3.4–5)
ALBUMIN/GLOB SERPL: 1.1 {RATIO} (ref 1.1–2.2)
ALP SERPL-CCNC: 81 U/L (ref 40–129)
ALT SERPL-CCNC: 56 U/L (ref 10–40)
ANION GAP SERPL CALCULATED.3IONS-SCNC: 7 MMOL/L (ref 3–16)
AST SERPL-CCNC: 127 U/L (ref 15–37)
BILIRUB SERPL-MCNC: 2.1 MG/DL (ref 0–1)
BUN SERPL-MCNC: 4 MG/DL (ref 7–20)
CALCIUM SERPL-MCNC: 7.9 MG/DL (ref 8.3–10.6)
CHLORIDE SERPL-SCNC: 105 MMOL/L (ref 99–110)
CO2 SERPL-SCNC: 25 MMOL/L (ref 21–32)
CREAT SERPL-MCNC: 0.6 MG/DL (ref 0.9–1.3)
DEPRECATED RDW RBC AUTO: 13.9 % (ref 12.4–15.4)
GFR SERPLBLD CREATININE-BSD FMLA CKD-EPI: >60 ML/MIN/{1.73_M2}
GLUCOSE SERPL-MCNC: 102 MG/DL (ref 70–99)
HCT VFR BLD AUTO: 30.1 % (ref 40.5–52.5)
HGB BLD-MCNC: 10.5 G/DL (ref 13.5–17.5)
MCH RBC QN AUTO: 34.1 PG (ref 26–34)
MCHC RBC AUTO-ENTMCNC: 34.8 G/DL (ref 31–36)
MCV RBC AUTO: 97.9 FL (ref 80–100)
PLATELET # BLD AUTO: 64 K/UL (ref 135–450)
PMV BLD AUTO: 9.6 FL (ref 5–10.5)
POTASSIUM SERPL-SCNC: 3.5 MMOL/L (ref 3.5–5.1)
PROT SERPL-MCNC: 6.8 G/DL (ref 6.4–8.2)
RBC # BLD AUTO: 3.08 M/UL (ref 4.2–5.9)
SODIUM SERPL-SCNC: 137 MMOL/L (ref 136–145)
WBC # BLD AUTO: 3.1 K/UL (ref 4–11)

## 2023-05-31 PROCEDURE — 2580000003 HC RX 258: Performed by: SPECIALIST

## 2023-05-31 PROCEDURE — APPNB15 APP NON BILLABLE TIME 0-15 MINS: Performed by: PHYSICIAN ASSISTANT

## 2023-05-31 PROCEDURE — 36415 COLL VENOUS BLD VENIPUNCTURE: CPT

## 2023-05-31 PROCEDURE — 99238 HOSP IP/OBS DSCHRG MGMT 30/<: CPT | Performed by: INTERNAL MEDICINE

## 2023-05-31 PROCEDURE — 6370000000 HC RX 637 (ALT 250 FOR IP): Performed by: SPECIALIST

## 2023-05-31 PROCEDURE — APPSS15 APP SPLIT SHARED TIME 0-15 MINUTES: Performed by: PHYSICIAN ASSISTANT

## 2023-05-31 PROCEDURE — 80053 COMPREHEN METABOLIC PANEL: CPT

## 2023-05-31 PROCEDURE — 85027 COMPLETE CBC AUTOMATED: CPT

## 2023-05-31 RX ORDER — FOLIC ACID 1 MG/1
1 TABLET ORAL DAILY
Qty: 30 TABLET | Refills: 3 | Status: SHIPPED | OUTPATIENT
Start: 2023-06-01

## 2023-05-31 RX ORDER — GABAPENTIN 300 MG/1
300 CAPSULE ORAL 3 TIMES DAILY
Qty: 90 CAPSULE | Refills: 0 | Status: SHIPPED | OUTPATIENT
Start: 2023-05-31 | End: 2023-06-30

## 2023-05-31 RX ADMIN — DEXTROSE AND SODIUM CHLORIDE: 5; 900 INJECTION, SOLUTION INTRAVENOUS at 06:44

## 2023-05-31 RX ADMIN — PANTOPRAZOLE SODIUM 40 MG: 40 TABLET, DELAYED RELEASE ORAL at 06:39

## 2023-05-31 RX ADMIN — GABAPENTIN 600 MG: 300 CAPSULE ORAL at 09:05

## 2023-05-31 RX ADMIN — BUPRENORPHINE AND NALOXONE 1 FILM: 8; 2 FILM BUCCAL; SUBLINGUAL at 09:05

## 2023-05-31 RX ADMIN — FOLIC ACID 1 MG: 1 TABLET ORAL at 09:05

## 2023-05-31 NOTE — PROGRESS NOTES
General and Vascular Surgery                                                           Daily Progress Note                                                             Anahi Hart PA-C    Pt Name: Ja Rodriguez  Medical Record Number: [id number]  Date of Birth 1985   Today's Date: 5/31/2023    Chief Complaint: Abdominal pain    ASSESSMENT/PLAN  Abdomen soft nontender. Elevated LFTs 2/2 alcohol abuse. No signs of acute cholecystitis. Ok to advance diet as tolerated from a general surgery standpoint  HIDA: no evidence of acute cholecystitis. LFT's/bilirubin improved yesterday  GI to consult  Encouraged complete EtOH and tobacco cessation. Monitor for withdrawals   No general surgery needs at this time. Will sign off. EDUCATION  Patient educated about their illness/diagnosis, stated above, and all questions answered. We discussed the importance of nutrition, medications they are taking, and healthy lifestyle. Autumn Anderson has slightly improved from yesterday. Pain is well controlled. He has no nausea and no vomiting. Current activity is ad jonh    OBJECTIVE  VITALS:  height is 5' 9\" (1.753 m) and weight is 205 lb 0.4 oz (93 kg). His axillary temperature is 98 °F (36.7 °C). His blood pressure is 144/87 (abnormal) and his pulse is 94. His respiration is 13 and oxygen saturation is 93%. VITALS:  BP (!) 144/87   Pulse 94   Temp 98 °F (36.7 °C) (Axillary)   Resp 13   Ht 5' 9\" (1.753 m)   Wt 205 lb 0.4 oz (93 kg)   SpO2 93%   BMI 30.28 kg/m²   GENERAL: alert, no distress  LUNGS: clear to ausculation, without wheezes, rales or rhonci  HEART: normal rate and regular rhythm  ABDOMEN: soft, nontender. .  non-distended  EXTREMITY: no cyanosis, clubbing or edema  I/O last 3 completed shifts: In: 4455.1 [P.O.:1440; I.V.:3808.5; IV Piggyback:1469.1]  Out: 850 [Urine:750; Emesis/NG output:100]  No intake/output data recorded.     LABS  Recent Labs

## 2023-05-31 NOTE — DISCHARGE INSTR - DIET

## 2023-05-31 NOTE — PLAN OF CARE
Problem: Discharge Planning  Goal: Discharge to home or other facility with appropriate resources  5/29/2023 0928 by Carl Diaz RN  Outcome: Progressing  5/29/2023 0545 by Mik Foley RN  Outcome: Progressing  Flowsheets (Taken 5/29/2023 0413)  Discharge to home or other facility with appropriate resources:   Identify barriers to discharge with patient and caregiver   Identify discharge learning needs (meds, wound care, etc)   Refer to discharge planning if patient needs post-hospital services based on physician order or complex needs related to functional status, cognitive ability or social support system   Arrange for needed discharge resources and transportation as appropriate     Problem: Pain  Goal: Verbalizes/displays adequate comfort level or baseline comfort level  5/29/2023 0928 by Carl Diaz RN  Outcome: Progressing  5/29/2023 0545 by Mik Foley RN  Outcome: Progressing     Problem: Safety - Adult  Goal: Free from fall injury  5/29/2023 0928 by Carl Diaz RN  Outcome: Progressing  5/29/2023 0545 by Mik Foley RN  Outcome: Progressing     Problem: Cardiovascular - Adult  Goal: Maintains optimal cardiac output and hemodynamic stability  5/29/2023 0928 by Carl Diaz RN  Outcome: Progressing  5/29/2023 0545 by Mik Foley RN  Outcome: Progressing  Goal: Absence of cardiac dysrhythmias or at baseline  5/29/2023 0928 by Carl Diaz RN  Outcome: Progressing  5/29/2023 0545 by Mik Foley RN  Outcome: Progressing     Problem: Gastrointestinal - Adult  Goal: Minimal or absence of nausea and vomiting  5/29/2023 0928 by Carl Diaz RN  Outcome: Progressing  Flowsheets (Taken 5/29/2023 0900)  Minimal or absence of nausea and vomiting:   Maintain NPO status until nausea and vomiting are resolved   Advance diet as tolerated, if ordered  5/29/2023 0545 by Mik Foley RN  Outcome: Progressing  Goal: Maintains or returns to baseline bowel function  5/29/2023 0928 by Carl Diaz RN  Outcome:
Problem: Discharge Planning  Goal: Discharge to home or other facility with appropriate resources  5/31/2023 1001 by Theodore Aly RN  Outcome: Progressing  Flowsheets (Taken 5/31/2023 0957)  Discharge to home or other facility with appropriate resources:   Identify barriers to discharge with patient and caregiver   Arrange for needed discharge resources and transportation as appropriate   Identify discharge learning needs (meds, wound care, etc)   Arrange for interpreters to assist at discharge as needed   Refer to discharge planning if patient needs post-hospital services based on physician order or complex needs related to functional status, cognitive ability or social support system  5/31/2023 0853 by Stephanie Bryson RN  Outcome: Progressing     Problem: Pain  Goal: Verbalizes/displays adequate comfort level or baseline comfort level  5/31/2023 1001 by Theodore Aly RN  Outcome: Progressing  5/31/2023 0853 by Stephanie Bryson RN  Outcome: Progressing     Problem: Safety - Adult  Goal: Free from fall injury  5/31/2023 1001 by Theodore Aly RN  Outcome: Progressing  5/31/2023 0853 by Stephanie Bryson RN  Outcome: Progressing  Flowsheets (Taken 5/31/2023 5917)  Free From Fall Injury: Instruct family/caregiver on patient safety     Problem: Cardiovascular - Adult  Goal: Maintains optimal cardiac output and hemodynamic stability  5/31/2023 1001 by Theodore Aly RN  Outcome: Progressing  Flowsheets (Taken 5/31/2023 0957)  Maintains optimal cardiac output and hemodynamic stability:   Monitor blood pressure and heart rate   Monitor urine output and notify Licensed Independent Practitioner for values outside of normal range   Administer fluid and/or volume expanders as ordered   Assess for signs of decreased cardiac output  5/31/2023 0853 by Stephanie Bryson RN  Outcome: Progressing  Goal: Absence of cardiac dysrhythmias or at baseline  5/31/2023 1001 by Theodore Aly RN  Outcome: Progressing  Flowsheets
Problem: Discharge Planning  Goal: Discharge to home or other facility with appropriate resources  Outcome: Progressing     Problem: Pain  Goal: Verbalizes/displays adequate comfort level or baseline comfort level  Outcome: Progressing     Problem: Safety - Adult  Goal: Free from fall injury  Outcome: Progressing     Problem: Cardiovascular - Adult  Goal: Maintains optimal cardiac output and hemodynamic stability  Outcome: Progressing  Goal: Absence of cardiac dysrhythmias or at baseline  Outcome: Progressing     Problem: Gastrointestinal - Adult  Goal: Minimal or absence of nausea and vomiting  Outcome: Progressing  Goal: Maintains or returns to baseline bowel function  Outcome: Progressing  Goal: Maintains adequate nutritional intake  Outcome: Progressing  Goal: Establish and maintain optimal ostomy function  Outcome: Progressing     Problem: Genitourinary - Adult  Goal: Absence of urinary retention  Outcome: Progressing  Goal: Urinary catheter remains patent  Outcome: Progressing     Problem: Infection - Adult  Goal: Absence of infection at discharge  Outcome: Progressing  Goal: Absence of infection during hospitalization  Outcome: Progressing  Goal: Absence of fever/infection during anticipated neutropenic period  Outcome: Progressing
Problem: Discharge Planning  Goal: Discharge to home or other facility with appropriate resources  Outcome: Progressing  Flowsheets (Taken 5/29/2023 8639)  Discharge to home or other facility with appropriate resources:   Identify barriers to discharge with patient and caregiver   Identify discharge learning needs (meds, wound care, etc)   Refer to discharge planning if patient needs post-hospital services based on physician order or complex needs related to functional status, cognitive ability or social support system   Arrange for needed discharge resources and transportation as appropriate     Problem: Pain  Goal: Verbalizes/displays adequate comfort level or baseline comfort level  Outcome: Progressing     Problem: Safety - Adult  Goal: Free from fall injury  Outcome: Progressing     Problem: Cardiovascular - Adult  Goal: Maintains optimal cardiac output and hemodynamic stability  Outcome: Progressing  Goal: Absence of cardiac dysrhythmias or at baseline  Outcome: Progressing     Problem: Gastrointestinal - Adult  Goal: Minimal or absence of nausea and vomiting  Outcome: Progressing  Goal: Maintains or returns to baseline bowel function  Outcome: Progressing  Goal: Maintains adequate nutritional intake  Outcome: Progressing  Goal: Establish and maintain optimal ostomy function  Outcome: Progressing     Problem: Genitourinary - Adult  Goal: Absence of urinary retention  Outcome: Progressing  Goal: Urinary catheter remains patent  Outcome: Progressing     Problem: Infection - Adult  Goal: Absence of infection at discharge  Outcome: Progressing  Goal: Absence of infection during hospitalization  Outcome: Progressing  Goal: Absence of fever/infection during anticipated neutropenic period  Outcome: Progressing
Progressing  Flowsheets (Taken 5/30/2023 2100 by Andriy Vyas, RN)  Absence of infection at discharge: Assess and monitor for signs and symptoms of infection  Goal: Absence of infection during hospitalization  Outcome: Progressing  Flowsheets (Taken 5/30/2023 2100 by Andriy Vyas, RN)  Absence of infection during hospitalization:   Assess and monitor for signs and symptoms of infection   Monitor all insertion sites i.e., indwelling lines, tubes and drains  Goal: Absence of fever/infection during anticipated neutropenic period  Outcome: Progressing     Problem: ABCDS Injury Assessment  Goal: Absence of physical injury  Outcome: Progressing
patent: Assess patency of urinary catheter     Problem: Infection - Adult  Goal: Absence of infection at discharge  5/30/2023 1103 by Robert Bobo RN  Outcome: Progressing  5/30/2023 0345 by Mattie Loving RN  Outcome: Progressing  Flowsheets (Taken 5/29/2023 2030)  Absence of infection at discharge:   Assess and monitor for signs and symptoms of infection   Monitor all insertion sites i.e., indwelling lines, tubes and drains   Monitor lab/diagnostic results  Goal: Absence of infection during hospitalization  5/30/2023 1103 by Robert Bobo RN  Outcome: Progressing  5/30/2023 0345 by Mattie Loving RN  Outcome: Progressing  Flowsheets (Taken 5/29/2023 2030)  Absence of infection during hospitalization:   Assess and monitor for signs and symptoms of infection   Monitor lab/diagnostic results   Monitor all insertion sites i.e., indwelling lines, tubes and drains  Goal: Absence of fever/infection during anticipated neutropenic period  5/30/2023 1103 by Robert Bobo RN  Outcome: Progressing  5/30/2023 0345 by Mattie Loving RN  Outcome: Progressing  Flowsheets (Taken 5/29/2023 2030)  Absence of fever/infection during anticipated neutropenic period: Monitor white blood cell count
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
## 2023-05-31 NOTE — CARE COORDINATION
Case Management Discharge Note          Date / Time of Note: 5/31/2023 1:14 PM                  Patient Name: Steven Weinberg   YOB: 1985  Diagnosis: Acute cholecystitis [K81.0]   Date / Time: 5/28/2023 10:16 PM    Financial:  Payor: Behzad Ahumada / Plan: Garcia Leiva / Product Type: *No Product type* /      Pharmacy:    [facility] [id number] - [address] [facility] [place] [address] [facility] - F [phone]  [address] [facility]  Phone: [phone] Fax: [phone]    [facility] [id number] [address]  [address] [facility] [place] [address]  Phone: [phone] Fax: [phone]    [facility] [id number] [place] [address] [phone] Anni Naches [phone]  [address] [facility] [address]  Phone: [phone] Fax: [phone]      Assistance purchasing medications?: Potential Assistance Purchasing Medications: No  Assistance provided by Case Management: None at this time    DISCHARGE Disposition: Home- No Services Needed      Transportation:  Transportation PLAN for discharge: family   Mode of Transport: Private Car    Additional CM Notes: denies needs, had a ride home    Yoanna Brand RN, BSN,   [phone]  Electronically signed by Yoanna Brand RN on 5/31/2023 at 1:14 PM

## 2023-05-31 NOTE — PROGRESS NOTES
Patient alert and oriented upon discharge. PIV removed without complications. Tele monitor removed. Discharge instructions discussed with patient. Patient to discharge home with spouse.     Electronically signed by Emily Perez RN on 5/31/2023 at 12:42 PM

## 2023-06-02 NOTE — PROGRESS NOTES
Physician Progress Note      PATIENTTony Bravo  CSN #:                  [id number]  :                       1985  ADMIT DATE:       2023 10:16 PM  [address] DATE:        2023 1:08 PM  RESPONDING  PROVIDER #:        Adriana Lowery MD          QUERY TEXT:    Patient admitted with abdominal pain, noted to have acute cholecystitis. If   possible, please document in progress notes and discharge summary if you are   evaluating and/or treating any of the following: The medical record reflects the following:  Risk Factors: Hx- Heavy alcohol use, Hep C  Clinical Indicators: ALT 60, , T bili 2.4, D bili 0.8, I bili   1.6........ [facility] Hepatobiliary scan-No evidence of acute cholecystitis. .. [facility] Busch Per GI   consult note on - Alcoholic hepatitis with AST greater than ALT. [facility] Busch Per   surgery pn on 23- Elevated LFTs 2/2 alcohol abuse. No signs of acute   cholecystitis. Treatment: Cipro IV, Flagyl IV, Surgery consult, GI consult, HIDA scan. [facility] RN BSN CDS CRCR  [email or web address]  Options provided:  -- Acute cholecystitis  -- Acute cholecystitis ruled out  -- Other - I will add my own diagnosis  -- Disagree - Not applicable / Not valid  -- Disagree - Clinically unable to determine / Unknown  -- Refer to Clinical Documentation Reviewer    PROVIDER RESPONSE TEXT:    This patient does not have acute cholecystitis.     Query created by: Anh Llanos on 2023 6:11 AM      Electronically signed by:  Adriana Lowery MD 2023 11:01 PM

## 2023-12-25 ENCOUNTER — HOSPITAL ENCOUNTER (INPATIENT)
Age: 38
LOS: 3 days | Discharge: HOME | DRG: 253 | End: 2023-12-28
Attending: SPECIALIST | Admitting: SPECIALIST
Payer: COMMERCIAL

## 2023-12-25 ENCOUNTER — APPOINTMENT (OUTPATIENT)
Dept: CT IMAGING | Age: 38
DRG: 253 | End: 2023-12-25
Payer: COMMERCIAL

## 2023-12-25 DIAGNOSIS — F10.20 ALCOHOLISM (HCC): ICD-10-CM

## 2023-12-25 DIAGNOSIS — K92.0 HEMATEMESIS, UNSPECIFIED WHETHER NAUSEA PRESENT: Primary | ICD-10-CM

## 2023-12-25 DIAGNOSIS — K52.9 COLITIS: ICD-10-CM

## 2023-12-25 DIAGNOSIS — R93.2 ABNORMAL CT SCAN, GALLBLADDER: ICD-10-CM

## 2023-12-25 PROBLEM — K92.2 UPPER GI BLEED: Status: ACTIVE | Noted: 2023-12-25

## 2023-12-25 LAB
ABO + RH BLD: NORMAL
ALBUMIN SERPL-MCNC: 3.7 G/DL (ref 3.4–5)
ALBUMIN/GLOB SERPL: 1.1 {RATIO} (ref 1.1–2.2)
ALP SERPL-CCNC: 119 U/L (ref 40–129)
ALT SERPL-CCNC: 47 U/L (ref 10–40)
ANION GAP SERPL CALCULATED.3IONS-SCNC: 9 MMOL/L (ref 3–16)
AST SERPL-CCNC: 134 U/L (ref 15–37)
BASOPHILS # BLD: 0.1 K/UL (ref 0–0.2)
BASOPHILS NFR BLD: 1.2 %
BILIRUB SERPL-MCNC: 3.5 MG/DL (ref 0–1)
BILIRUB UR QL STRIP.AUTO: NEGATIVE
BLD GP AB SCN SERPL QL: NORMAL
BUN SERPL-MCNC: 7 MG/DL (ref 7–20)
CALCIUM SERPL-MCNC: 8.4 MG/DL (ref 8.3–10.6)
CHLORIDE SERPL-SCNC: 102 MMOL/L (ref 99–110)
CLARITY UR: CLEAR
CO2 SERPL-SCNC: 27 MMOL/L (ref 21–32)
COLOR UR: YELLOW
CREAT SERPL-MCNC: 0.6 MG/DL (ref 0.9–1.3)
DEPRECATED RDW RBC AUTO: 15.3 % (ref 12.4–15.4)
EOSINOPHIL # BLD: 0.2 K/UL (ref 0–0.6)
EOSINOPHIL NFR BLD: 3 %
GFR SERPLBLD CREATININE-BSD FMLA CKD-EPI: >60 ML/MIN/{1.73_M2}
GLUCOSE SERPL-MCNC: 146 MG/DL (ref 70–99)
GLUCOSE UR STRIP.AUTO-MCNC: NEGATIVE MG/DL
HCT VFR BLD AUTO: 28.5 % (ref 40.5–52.5)
HGB BLD-MCNC: 9.7 G/DL (ref 13.5–17.5)
HGB UR QL STRIP.AUTO: NEGATIVE
INR PPP: 1.74 (ref 0.84–1.16)
KETONES UR STRIP.AUTO-MCNC: NEGATIVE MG/DL
LEUKOCYTE ESTERASE UR QL STRIP.AUTO: NEGATIVE
LIPASE SERPL-CCNC: 53 U/L (ref 13–60)
LYMPHOCYTES # BLD: 1.7 K/UL (ref 1–5.1)
LYMPHOCYTES NFR BLD: 33.8 %
MAGNESIUM SERPL-MCNC: 1.8 MG/DL (ref 1.8–2.4)
MCH RBC QN AUTO: 33.1 PG (ref 26–34)
MCHC RBC AUTO-ENTMCNC: 34.1 G/DL (ref 31–36)
MCV RBC AUTO: 97.2 FL (ref 80–100)
MONOCYTES # BLD: 0.6 K/UL (ref 0–1.3)
MONOCYTES NFR BLD: 10.9 %
NEUTROPHILS # BLD: 2.6 K/UL (ref 1.7–7.7)
NEUTROPHILS NFR BLD: 51.1 %
NITRITE UR QL STRIP.AUTO: NEGATIVE
PH UR STRIP.AUTO: 6.5 [PH] (ref 5–8)
PLATELET # BLD AUTO: 88 K/UL (ref 135–450)
PMV BLD AUTO: 9.3 FL (ref 5–10.5)
POTASSIUM SERPL-SCNC: 3.2 MMOL/L (ref 3.5–5.1)
PROT SERPL-MCNC: 7 G/DL (ref 6.4–8.2)
PROT UR STRIP.AUTO-MCNC: NEGATIVE MG/DL
PROTHROMBIN TIME: 20.2 SEC (ref 11.5–14.8)
RBC # BLD AUTO: 2.94 M/UL (ref 4.2–5.9)
SODIUM SERPL-SCNC: 138 MMOL/L (ref 136–145)
SP GR UR STRIP.AUTO: 1 (ref 1–1.03)
UA COMPLETE W REFLEX CULTURE PNL UR: NORMAL
UA DIPSTICK W REFLEX MICRO PNL UR: NORMAL
URN SPEC COLLECT METH UR: NORMAL
UROBILINOGEN UR STRIP-ACNC: 1 E.U./DL
WBC # BLD AUTO: 5 K/UL (ref 4–11)

## 2023-12-25 PROCEDURE — 83735 ASSAY OF MAGNESIUM: CPT

## 2023-12-25 PROCEDURE — 86901 BLOOD TYPING SEROLOGIC RH(D): CPT

## 2023-12-25 PROCEDURE — 86850 RBC ANTIBODY SCREEN: CPT

## 2023-12-25 PROCEDURE — 74177 CT ABD & PELVIS W/CONTRAST: CPT

## 2023-12-25 PROCEDURE — 2580000003 HC RX 258: Performed by: PHYSICIAN ASSISTANT

## 2023-12-25 PROCEDURE — 96365 THER/PROPH/DIAG IV INF INIT: CPT

## 2023-12-25 PROCEDURE — 96375 TX/PRO/DX INJ NEW DRUG ADDON: CPT

## 2023-12-25 PROCEDURE — C9113 INJ PANTOPRAZOLE SODIUM, VIA: HCPCS | Performed by: PHYSICIAN ASSISTANT

## 2023-12-25 PROCEDURE — 86900 BLOOD TYPING SEROLOGIC ABO: CPT

## 2023-12-25 PROCEDURE — 80053 COMPREHEN METABOLIC PANEL: CPT

## 2023-12-25 PROCEDURE — 85025 COMPLETE CBC W/AUTO DIFF WBC: CPT

## 2023-12-25 PROCEDURE — 6360000004 HC RX CONTRAST MEDICATION: Performed by: PHYSICIAN ASSISTANT

## 2023-12-25 PROCEDURE — 81003 URINALYSIS AUTO W/O SCOPE: CPT

## 2023-12-25 PROCEDURE — 1200000000 HC SEMI PRIVATE

## 2023-12-25 PROCEDURE — 99285 EMERGENCY DEPT VISIT HI MDM: CPT

## 2023-12-25 PROCEDURE — 85610 PROTHROMBIN TIME: CPT

## 2023-12-25 PROCEDURE — 6360000002 HC RX W HCPCS: Performed by: PHYSICIAN ASSISTANT

## 2023-12-25 PROCEDURE — 83690 ASSAY OF LIPASE: CPT

## 2023-12-25 RX ORDER — SODIUM CHLORIDE, SODIUM LACTATE, POTASSIUM CHLORIDE, CALCIUM CHLORIDE 600; 310; 30; 20 MG/100ML; MG/100ML; MG/100ML; MG/100ML
INJECTION, SOLUTION INTRAVENOUS ONCE
Status: COMPLETED | OUTPATIENT
Start: 2023-12-25 | End: 2023-12-25

## 2023-12-25 RX ORDER — ONDANSETRON 2 MG/ML
4 INJECTION INTRAMUSCULAR; INTRAVENOUS ONCE
Status: COMPLETED | OUTPATIENT
Start: 2023-12-25 | End: 2023-12-25

## 2023-12-25 RX ORDER — PANTOPRAZOLE SODIUM 40 MG/10ML
60 INJECTION, POWDER, LYOPHILIZED, FOR SOLUTION INTRAVENOUS ONCE
Status: COMPLETED | OUTPATIENT
Start: 2023-12-25 | End: 2023-12-25

## 2023-12-25 RX ORDER — POTASSIUM CHLORIDE 7.45 MG/ML
10 INJECTION INTRAVENOUS ONCE
Status: COMPLETED | OUTPATIENT
Start: 2023-12-25 | End: 2023-12-25

## 2023-12-25 RX ORDER — 0.9 % SODIUM CHLORIDE 0.9 %
1000 INTRAVENOUS SOLUTION INTRAVENOUS ONCE
Status: COMPLETED | OUTPATIENT
Start: 2023-12-25 | End: 2023-12-26

## 2023-12-25 RX ORDER — KETOROLAC TROMETHAMINE 15 MG/ML
15 INJECTION, SOLUTION INTRAMUSCULAR; INTRAVENOUS ONCE
Status: DISCONTINUED | OUTPATIENT
Start: 2023-12-25 | End: 2023-12-25

## 2023-12-25 RX ADMIN — HYDROMORPHONE HYDROCHLORIDE 0.5 MG: 1 INJECTION, SOLUTION INTRAMUSCULAR; INTRAVENOUS; SUBCUTANEOUS at 19:16

## 2023-12-25 RX ADMIN — PANTOPRAZOLE SODIUM 60 MG: 40 INJECTION, POWDER, FOR SOLUTION INTRAVENOUS at 19:13

## 2023-12-25 RX ADMIN — SODIUM CHLORIDE 1000 ML: 9 INJECTION, SOLUTION INTRAVENOUS at 21:27

## 2023-12-25 RX ADMIN — ONDANSETRON 4 MG: 2 INJECTION INTRAMUSCULAR; INTRAVENOUS at 19:13

## 2023-12-25 RX ADMIN — SODIUM CHLORIDE, POTASSIUM CHLORIDE, SODIUM LACTATE AND CALCIUM CHLORIDE: 600; 310; 30; 20 INJECTION, SOLUTION INTRAVENOUS at 19:12

## 2023-12-25 RX ADMIN — IOPAMIDOL 75 ML: 755 INJECTION, SOLUTION INTRAVENOUS at 21:05

## 2023-12-25 RX ADMIN — POTASSIUM CHLORIDE 10 MEQ: 7.46 INJECTION, SOLUTION INTRAVENOUS at 20:43

## 2023-12-25 ASSESSMENT — PAIN DESCRIPTION - ONSET: ONSET: ON-GOING

## 2023-12-25 ASSESSMENT — PAIN DESCRIPTION - FREQUENCY: FREQUENCY: CONTINUOUS

## 2023-12-25 ASSESSMENT — PAIN DESCRIPTION - ORIENTATION: ORIENTATION: ANTERIOR

## 2023-12-25 ASSESSMENT — PAIN SCALES - GENERAL
PAINLEVEL_OUTOF10: 6
PAINLEVEL_OUTOF10: 8

## 2023-12-25 ASSESSMENT — PAIN DESCRIPTION - LOCATION: LOCATION: ABDOMEN

## 2023-12-25 ASSESSMENT — PAIN DESCRIPTION - PAIN TYPE: TYPE: ACUTE PAIN

## 2023-12-25 ASSESSMENT — PAIN DESCRIPTION - DESCRIPTORS: DESCRIPTORS: ACHING

## 2023-12-25 NOTE — ED PROVIDER NOTES
(has no administration in time range)   ondansetron (ZOFRAN) injection 4 mg (4 mg IntraVENous Given 12/25/23 1913)   lactated ringers IV soln infusion (0 mLs IntraVENous Stopped 12/25/23 2044)   pantoprazole (PROTONIX) injection 60 mg (60 mg IntraVENous Given 12/25/23 1913)   HYDROmorphone (DILAUDID) injection 0.5 mg (0.5 mg IntraVENous Given 12/25/23 1916)   potassium chloride 10 mEq/100 mL IVPB (Peripheral Line) (0 mEq IntraVENous Stopped 12/25/23 2159)   iopamidol (ISOVUE-370) 76 % injection 75 mL (75 mLs IntraVENous Given 12/25/23 2105)   sodium chloride 0.9 % bolus 1,000 mL (0 mLs IntraVENous Stopped 12/26/23 0041)             Is this patient to be included in the SEP-1 Core Measure due to severe sepsis or septic shock? No   Exclusion criteria - the patient is NOT to be included for SEP-1 Core Measure due to: Infection is not suspected    CONSULTS: (Who and What was discussed)  IP CONSULT TO SOCIAL WORK  IP CONSULT TO GENERAL SURGERY  IP CONSULT TO GI  Discussion with Other Profesionals : Admitting Team on-call primary care physician Dr. Miriam Wolff    Social Determinants : None    Records Reviewed : Other notes from admission in May for similar    CC/HPI Summary, DDx, ED Course, and Reassessment: Patient is an alcoholic complaining of months of pain was admitted in May for something similar but did not follow-up. He drinks beer heavily and daily. He had some increased reported bloody emesis today and no further emesis in the emergency department. CT scan results as above including possible colitis, duodenitis, gallbladder findings. History of hepatitis. He is agreeable to admission. Hemoglobin 9.7. No dark tarry or bloody stool. Consult with the primary care physician and placed on CIWA protocol. I am the Primary Clinician of Record. FINAL IMPRESSION      1. Hematemesis, unspecified whether nausea present    2. Abnormal CT scan, gallbladder    3. Colitis    4.  Alcoholism ([address])     DISPOSITION/PLAN     DISPOSITION Admitted 12/25/2023 11:25:35 PM      PATIENT REFERRED TO:  No follow-up provider specified.    DISCHARGE MEDICATIONS:  New Prescriptions    No medications on file       DISCONTINUED MEDICATIONS:  Discontinued Medications    No medications on file              (Please note that portions of this note were completed with a voice recognition program.  Efforts were made to edit the dictations but occasionally words are mis-transcribed.)    SERGIO Herrera (electronically signed)           Maricarmen Salmeron PA  12/26/23 0251

## 2023-12-26 ENCOUNTER — ANESTHESIA EVENT (OUTPATIENT)
Dept: ENDOSCOPY | Age: 38
DRG: 280 | End: 2023-12-26
Payer: COMMERCIAL

## 2023-12-26 ENCOUNTER — ANESTHESIA (OUTPATIENT)
Dept: ENDOSCOPY | Age: 38
DRG: 280 | End: 2023-12-26
Payer: COMMERCIAL

## 2023-12-26 PROCEDURE — 6370000000 HC RX 637 (ALT 250 FOR IP): Performed by: INTERNAL MEDICINE

## 2023-12-26 PROCEDURE — 6370000000 HC RX 637 (ALT 250 FOR IP): Performed by: SPECIALIST

## 2023-12-26 PROCEDURE — 1200000000 HC SEMI PRIVATE

## 2023-12-26 PROCEDURE — 3609018200 HC EGD INJECTION SCLEROSIS ESOPHGL/GASTRIC VARICES: Performed by: INTERNAL MEDICINE

## 2023-12-26 PROCEDURE — 2580000003 HC RX 258: Performed by: SPECIALIST

## 2023-12-26 PROCEDURE — 3700000001 HC ADD 15 MINUTES (ANESTHESIA): Performed by: INTERNAL MEDICINE

## 2023-12-26 PROCEDURE — 2709999900 HC NON-CHARGEABLE SUPPLY: Performed by: INTERNAL MEDICINE

## 2023-12-26 PROCEDURE — 7100000001 HC PACU RECOVERY - ADDTL 15 MIN: Performed by: INTERNAL MEDICINE

## 2023-12-26 PROCEDURE — 6360000002 HC RX W HCPCS: Performed by: ANESTHESIOLOGY

## 2023-12-26 PROCEDURE — 2580000003 HC RX 258: Performed by: INTERNAL MEDICINE

## 2023-12-26 PROCEDURE — 6360000002 HC RX W HCPCS: Performed by: INTERNAL MEDICINE

## 2023-12-26 PROCEDURE — 6360000002 HC RX W HCPCS: Performed by: SPECIALIST

## 2023-12-26 PROCEDURE — 6360000002 HC RX W HCPCS: Performed by: NURSE ANESTHETIST, CERTIFIED REGISTERED

## 2023-12-26 PROCEDURE — 06L38CZ OCCLUSION OF ESOPHAGEAL VEIN WITH EXTRALUMINAL DEVICE, VIA NATURAL OR ARTIFICIAL OPENING ENDOSCOPIC: ICD-10-PCS | Performed by: INTERNAL MEDICINE

## 2023-12-26 PROCEDURE — 82105 ALPHA-FETOPROTEIN SERUM: CPT

## 2023-12-26 PROCEDURE — 36415 COLL VENOUS BLD VENIPUNCTURE: CPT

## 2023-12-26 PROCEDURE — 7100000000 HC PACU RECOVERY - FIRST 15 MIN: Performed by: INTERNAL MEDICINE

## 2023-12-26 PROCEDURE — 3700000000 HC ANESTHESIA ATTENDED CARE: Performed by: INTERNAL MEDICINE

## 2023-12-26 PROCEDURE — 2500000003 HC RX 250 WO HCPCS: Performed by: NURSE ANESTHETIST, CERTIFIED REGISTERED

## 2023-12-26 PROCEDURE — 87522 HEPATITIS C REVRS TRNSCRPJ: CPT

## 2023-12-26 RX ORDER — SODIUM CHLORIDE 9 MG/ML
INJECTION, SOLUTION INTRAVENOUS PRN
Status: DISCONTINUED | OUTPATIENT
Start: 2023-12-26 | End: 2023-12-26 | Stop reason: HOSPADM

## 2023-12-26 RX ORDER — CETIRIZINE HYDROCHLORIDE 10 MG/1
10 TABLET ORAL DAILY
Status: ON HOLD | COMMUNITY
End: 2023-12-28 | Stop reason: HOSPADM

## 2023-12-26 RX ORDER — LORAZEPAM 1 MG/1
2 TABLET ORAL
Status: DISCONTINUED | OUTPATIENT
Start: 2023-12-26 | End: 2023-12-28 | Stop reason: HOSPADM

## 2023-12-26 RX ORDER — SODIUM CHLORIDE 0.9 % (FLUSH) 0.9 %
5-40 SYRINGE (ML) INJECTION EVERY 12 HOURS SCHEDULED
Status: DISCONTINUED | OUTPATIENT
Start: 2023-12-26 | End: 2023-12-28 | Stop reason: HOSPADM

## 2023-12-26 RX ORDER — LORAZEPAM 1 MG/1
3 TABLET ORAL
Status: DISCONTINUED | OUTPATIENT
Start: 2023-12-26 | End: 2023-12-28 | Stop reason: HOSPADM

## 2023-12-26 RX ORDER — PANTOPRAZOLE SODIUM 40 MG/1
40 TABLET, DELAYED RELEASE ORAL
Status: DISCONTINUED | OUTPATIENT
Start: 2023-12-26 | End: 2023-12-28 | Stop reason: HOSPADM

## 2023-12-26 RX ORDER — PROPOFOL 10 MG/ML
INJECTION, EMULSION INTRAVENOUS PRN
Status: DISCONTINUED | OUTPATIENT
Start: 2023-12-26 | End: 2023-12-26 | Stop reason: SDUPTHER

## 2023-12-26 RX ORDER — LIDOCAINE HYDROCHLORIDE 20 MG/ML
INJECTION, SOLUTION INFILTRATION; PERINEURAL PRN
Status: DISCONTINUED | OUTPATIENT
Start: 2023-12-26 | End: 2023-12-26 | Stop reason: SDUPTHER

## 2023-12-26 RX ORDER — LORAZEPAM 2 MG/ML
4 INJECTION INTRAMUSCULAR
Status: DISCONTINUED | OUTPATIENT
Start: 2023-12-26 | End: 2023-12-28 | Stop reason: HOSPADM

## 2023-12-26 RX ORDER — FENTANYL CITRATE 50 UG/ML
25 INJECTION, SOLUTION INTRAMUSCULAR; INTRAVENOUS EVERY 5 MIN PRN
Status: DISCONTINUED | OUTPATIENT
Start: 2023-12-26 | End: 2023-12-26

## 2023-12-26 RX ORDER — FLUTICASONE PROPIONATE 50 MCG
1 SPRAY, SUSPENSION (ML) NASAL DAILY
Status: DISCONTINUED | OUTPATIENT
Start: 2023-12-26 | End: 2023-12-28 | Stop reason: HOSPADM

## 2023-12-26 RX ORDER — CETIRIZINE HYDROCHLORIDE 10 MG/1
10 TABLET ORAL DAILY
Status: DISCONTINUED | OUTPATIENT
Start: 2023-12-26 | End: 2023-12-28 | Stop reason: HOSPADM

## 2023-12-26 RX ORDER — SODIUM CHLORIDE 0.9 % (FLUSH) 0.9 %
5-40 SYRINGE (ML) INJECTION PRN
Status: DISCONTINUED | OUTPATIENT
Start: 2023-12-26 | End: 2023-12-28 | Stop reason: SDUPTHER

## 2023-12-26 RX ORDER — IBUPROFEN 800 MG/1
800 TABLET ORAL EVERY 8 HOURS PRN
Status: ON HOLD | COMMUNITY
End: 2023-12-28 | Stop reason: HOSPADM

## 2023-12-26 RX ORDER — SODIUM CHLORIDE 0.9 % (FLUSH) 0.9 %
5-40 SYRINGE (ML) INJECTION EVERY 12 HOURS SCHEDULED
Status: DISCONTINUED | OUTPATIENT
Start: 2023-12-26 | End: 2023-12-26 | Stop reason: HOSPADM

## 2023-12-26 RX ORDER — FENTANYL CITRATE 0.05 MG/ML
25 INJECTION, SOLUTION INTRAMUSCULAR; INTRAVENOUS EVERY 5 MIN PRN
Status: DISCONTINUED | OUTPATIENT
Start: 2023-12-26 | End: 2023-12-28

## 2023-12-26 RX ORDER — GAUZE BANDAGE 2" X 2"
100 BANDAGE TOPICAL DAILY
Status: DISCONTINUED | OUTPATIENT
Start: 2023-12-26 | End: 2023-12-28 | Stop reason: HOSPADM

## 2023-12-26 RX ORDER — LORAZEPAM 2 MG/ML
1 INJECTION INTRAMUSCULAR
Status: DISCONTINUED | OUTPATIENT
Start: 2023-12-26 | End: 2023-12-28 | Stop reason: HOSPADM

## 2023-12-26 RX ORDER — PANTOPRAZOLE SODIUM 40 MG/1
40 TABLET, DELAYED RELEASE ORAL
Status: DISCONTINUED | OUTPATIENT
Start: 2023-12-26 | End: 2023-12-26

## 2023-12-26 RX ORDER — LORAZEPAM 2 MG/ML
3 INJECTION INTRAMUSCULAR
Status: DISCONTINUED | OUTPATIENT
Start: 2023-12-26 | End: 2023-12-28 | Stop reason: HOSPADM

## 2023-12-26 RX ORDER — LORAZEPAM 1 MG/1
4 TABLET ORAL
Status: DISCONTINUED | OUTPATIENT
Start: 2023-12-26 | End: 2023-12-28 | Stop reason: HOSPADM

## 2023-12-26 RX ORDER — ACETAMINOPHEN 325 MG/1
650 TABLET ORAL EVERY 4 HOURS PRN
Status: DISCONTINUED | OUTPATIENT
Start: 2023-12-26 | End: 2023-12-28 | Stop reason: HOSPADM

## 2023-12-26 RX ORDER — DEXTROSE AND SODIUM CHLORIDE 5; .45 G/100ML; G/100ML
INJECTION, SOLUTION INTRAVENOUS CONTINUOUS
Status: ACTIVE | OUTPATIENT
Start: 2023-12-26 | End: 2023-12-28

## 2023-12-26 RX ORDER — LORAZEPAM 2 MG/ML
2 INJECTION INTRAMUSCULAR
Status: DISCONTINUED | OUTPATIENT
Start: 2023-12-26 | End: 2023-12-28 | Stop reason: HOSPADM

## 2023-12-26 RX ORDER — FOLIC ACID 1 MG/1
1 TABLET ORAL DAILY
Status: DISCONTINUED | OUTPATIENT
Start: 2023-12-26 | End: 2023-12-28 | Stop reason: HOSPADM

## 2023-12-26 RX ORDER — MORPHINE SULFATE 2 MG/ML
2 INJECTION, SOLUTION INTRAMUSCULAR; INTRAVENOUS EVERY 6 HOURS PRN
Status: DISCONTINUED | OUTPATIENT
Start: 2023-12-26 | End: 2023-12-28 | Stop reason: HOSPADM

## 2023-12-26 RX ORDER — NICOTINE 21 MG/24HR
1 PATCH, TRANSDERMAL 24 HOURS TRANSDERMAL DAILY
Status: DISCONTINUED | OUTPATIENT
Start: 2023-12-26 | End: 2023-12-28 | Stop reason: HOSPADM

## 2023-12-26 RX ORDER — LORAZEPAM 1 MG/1
1 TABLET ORAL
Status: DISCONTINUED | OUTPATIENT
Start: 2023-12-26 | End: 2023-12-28 | Stop reason: HOSPADM

## 2023-12-26 RX ORDER — CYCLOBENZAPRINE HCL 10 MG
5 TABLET ORAL NIGHTLY
Status: DISCONTINUED | OUTPATIENT
Start: 2023-12-26 | End: 2023-12-28 | Stop reason: HOSPADM

## 2023-12-26 RX ORDER — ONDANSETRON 4 MG/1
4 TABLET, ORALLY DISINTEGRATING ORAL EVERY 8 HOURS PRN
Status: DISCONTINUED | OUTPATIENT
Start: 2023-12-26 | End: 2023-12-28 | Stop reason: HOSPADM

## 2023-12-26 RX ORDER — PRAZOSIN HYDROCHLORIDE 1 MG/1
2 CAPSULE ORAL NIGHTLY
Status: DISCONTINUED | OUTPATIENT
Start: 2023-12-26 | End: 2023-12-28 | Stop reason: HOSPADM

## 2023-12-26 RX ORDER — ERGOCALCIFEROL 1.25 MG/1
50000 CAPSULE ORAL WEEKLY
Status: DISCONTINUED | OUTPATIENT
Start: 2023-12-26 | End: 2023-12-28 | Stop reason: HOSPADM

## 2023-12-26 RX ORDER — SODIUM CHLORIDE 0.9 % (FLUSH) 0.9 %
5-40 SYRINGE (ML) INJECTION PRN
Status: DISCONTINUED | OUTPATIENT
Start: 2023-12-26 | End: 2023-12-28 | Stop reason: HOSPADM

## 2023-12-26 RX ORDER — IBUPROFEN 400 MG/1
400 TABLET ORAL EVERY 8 HOURS PRN
Status: DISCONTINUED | OUTPATIENT
Start: 2023-12-26 | End: 2023-12-28 | Stop reason: HOSPADM

## 2023-12-26 RX ORDER — SODIUM CHLORIDE 0.9 % (FLUSH) 0.9 %
5-40 SYRINGE (ML) INJECTION PRN
Status: DISCONTINUED | OUTPATIENT
Start: 2023-12-26 | End: 2023-12-26 | Stop reason: HOSPADM

## 2023-12-26 RX ORDER — FLUTICASONE PROPIONATE 50 MCG
1 SPRAY, SUSPENSION (ML) NASAL DAILY
COMMUNITY

## 2023-12-26 RX ORDER — POTASSIUM CHLORIDE 750 MG/1
10 TABLET, FILM COATED, EXTENDED RELEASE ORAL 2 TIMES DAILY
Status: DISCONTINUED | OUTPATIENT
Start: 2023-12-26 | End: 2023-12-28 | Stop reason: HOSPADM

## 2023-12-26 RX ORDER — SODIUM CHLORIDE 0.9 % (FLUSH) 0.9 %
5-40 SYRINGE (ML) INJECTION EVERY 12 HOURS SCHEDULED
Status: DISCONTINUED | OUTPATIENT
Start: 2023-12-26 | End: 2023-12-28 | Stop reason: SDUPTHER

## 2023-12-26 RX ORDER — ONDANSETRON 2 MG/ML
4 INJECTION INTRAMUSCULAR; INTRAVENOUS EVERY 6 HOURS PRN
Status: DISCONTINUED | OUTPATIENT
Start: 2023-12-26 | End: 2023-12-28 | Stop reason: HOSPADM

## 2023-12-26 RX ORDER — SODIUM CHLORIDE 9 MG/ML
INJECTION, SOLUTION INTRAVENOUS PRN
Status: DISCONTINUED | OUTPATIENT
Start: 2023-12-26 | End: 2023-12-28 | Stop reason: SDUPTHER

## 2023-12-26 RX ORDER — SODIUM CHLORIDE 9 MG/ML
INJECTION, SOLUTION INTRAVENOUS PRN
Status: DISCONTINUED | OUTPATIENT
Start: 2023-12-26 | End: 2023-12-28 | Stop reason: HOSPADM

## 2023-12-26 RX ORDER — ONDANSETRON 2 MG/ML
4 INJECTION INTRAMUSCULAR; INTRAVENOUS
Status: DISCONTINUED | OUTPATIENT
Start: 2023-12-26 | End: 2023-12-26

## 2023-12-26 RX ORDER — GAUZE BANDAGE 2" X 2"
100 BANDAGE TOPICAL DAILY
Status: DISCONTINUED | OUTPATIENT
Start: 2023-12-26 | End: 2023-12-26

## 2023-12-26 RX ORDER — TRAMADOL HYDROCHLORIDE 50 MG/1
50 TABLET ORAL EVERY 8 HOURS PRN
Status: DISCONTINUED | OUTPATIENT
Start: 2023-12-26 | End: 2023-12-28 | Stop reason: HOSPADM

## 2023-12-26 RX ORDER — GABAPENTIN 300 MG/1
300 CAPSULE ORAL 3 TIMES DAILY
Status: DISCONTINUED | OUTPATIENT
Start: 2023-12-26 | End: 2023-12-28 | Stop reason: HOSPADM

## 2023-12-26 RX ADMIN — CETIRIZINE HYDROCHLORIDE 10 MG: 10 TABLET ORAL at 09:52

## 2023-12-26 RX ADMIN — LIDOCAINE HYDROCHLORIDE 100 MG: 20 INJECTION, SOLUTION INFILTRATION; PERINEURAL at 11:07

## 2023-12-26 RX ADMIN — POTASSIUM CHLORIDE 10 MEQ: 750 TABLET, FILM COATED, EXTENDED RELEASE ORAL at 22:41

## 2023-12-26 RX ADMIN — FENTANYL CITRATE 25 MCG: 0.05 INJECTION, SOLUTION INTRAMUSCULAR; INTRAVENOUS at 12:19

## 2023-12-26 RX ADMIN — Medication 2 MG: at 16:46

## 2023-12-26 RX ADMIN — GABAPENTIN 300 MG: 300 CAPSULE ORAL at 09:52

## 2023-12-26 RX ADMIN — POTASSIUM CHLORIDE 10 MEQ: 750 TABLET, FILM COATED, EXTENDED RELEASE ORAL at 09:53

## 2023-12-26 RX ADMIN — OCTREOTIDE ACETATE 25 MCG/HR: 500 INJECTION, SOLUTION INTRAVENOUS; SUBCUTANEOUS at 14:53

## 2023-12-26 RX ADMIN — MORPHINE SULFATE 2 MG: 2 INJECTION, SOLUTION INTRAMUSCULAR; INTRAVENOUS at 18:48

## 2023-12-26 RX ADMIN — SODIUM CHLORIDE, PRESERVATIVE FREE 10 ML: 5 INJECTION INTRAVENOUS at 08:50

## 2023-12-26 RX ADMIN — PANTOPRAZOLE SODIUM 40 MG: 40 TABLET, DELAYED RELEASE ORAL at 18:48

## 2023-12-26 RX ADMIN — IBUPROFEN 400 MG: 400 TABLET, FILM COATED ORAL at 14:12

## 2023-12-26 RX ADMIN — Medication 100 MG: at 08:46

## 2023-12-26 RX ADMIN — TRAMADOL HYDROCHLORIDE 50 MG: 50 TABLET ORAL at 16:26

## 2023-12-26 RX ADMIN — GABAPENTIN 300 MG: 300 CAPSULE ORAL at 14:13

## 2023-12-26 RX ADMIN — PROPOFOL 150 MG: 10 INJECTION, EMULSION INTRAVENOUS at 11:07

## 2023-12-26 RX ADMIN — ERGOCALCIFEROL 50000 UNITS: 1.25 CAPSULE ORAL at 09:52

## 2023-12-26 RX ADMIN — DEXTROSE AND SODIUM CHLORIDE: 5; 450 INJECTION, SOLUTION INTRAVENOUS at 00:56

## 2023-12-26 RX ADMIN — SODIUM CHLORIDE: 9 INJECTION, SOLUTION INTRAVENOUS at 11:00

## 2023-12-26 RX ADMIN — CEFTRIAXONE SODIUM 2000 MG: 2 INJECTION, POWDER, FOR SOLUTION INTRAMUSCULAR; INTRAVENOUS at 14:12

## 2023-12-26 RX ADMIN — CYCLOBENZAPRINE 5 MG: 10 TABLET, FILM COATED ORAL at 22:41

## 2023-12-26 RX ADMIN — FOLIC ACID 1 MG: 1 TABLET ORAL at 09:53

## 2023-12-26 RX ADMIN — PROPOFOL 200 MCG/KG/MIN: 10 INJECTION, EMULSION INTRAVENOUS at 11:08

## 2023-12-26 RX ADMIN — GABAPENTIN 300 MG: 300 CAPSULE ORAL at 22:41

## 2023-12-26 RX ADMIN — ACETAMINOPHEN 650 MG: 325 TABLET ORAL at 16:27

## 2023-12-26 ASSESSMENT — PAIN SCALES - GENERAL
PAINLEVEL_OUTOF10: 10
PAINLEVEL_OUTOF10: 6
PAINLEVEL_OUTOF10: 6
PAINLEVEL_OUTOF10: 10
PAINLEVEL_OUTOF10: 9

## 2023-12-26 ASSESSMENT — PAIN - FUNCTIONAL ASSESSMENT
PAIN_FUNCTIONAL_ASSESSMENT: ACTIVITIES ARE NOT PREVENTED

## 2023-12-26 ASSESSMENT — PAIN DESCRIPTION - DESCRIPTORS
DESCRIPTORS: ACHING;DISCOMFORT;SORE
DESCRIPTORS: ACHING;SHARP;DISCOMFORT;STABBING
DESCRIPTORS: ACHING;THROBBING
DESCRIPTORS: DISCOMFORT;ACHING

## 2023-12-26 ASSESSMENT — PAIN DESCRIPTION - LOCATION
LOCATION: OTHER (COMMENT)
LOCATION: THROAT
LOCATION: THROAT;ABDOMEN
LOCATION: ABDOMEN
LOCATION: ABDOMEN;THROAT

## 2023-12-26 ASSESSMENT — PAIN SCALES - WONG BAKER
WONGBAKER_NUMERICALRESPONSE: 6

## 2023-12-26 ASSESSMENT — PAIN DESCRIPTION - ORIENTATION
ORIENTATION: MID;UPPER
ORIENTATION: UPPER
ORIENTATION: MID;UPPER

## 2023-12-26 ASSESSMENT — PAIN DESCRIPTION - PAIN TYPE: TYPE: SURGICAL PAIN

## 2023-12-26 ASSESSMENT — PAIN DESCRIPTION - FREQUENCY: FREQUENCY: CONTINUOUS

## 2023-12-26 ASSESSMENT — LIFESTYLE VARIABLES
HOW MANY STANDARD DRINKS CONTAINING ALCOHOL DO YOU HAVE ON A TYPICAL DAY: PATIENT DECLINED
HOW OFTEN DO YOU HAVE A DRINK CONTAINING ALCOHOL: PATIENT DECLINED

## 2023-12-26 NOTE — ED NOTES
Lab called at this time regarding add-on of magnesium. Lab stated she would run the lab at this time.

## 2023-12-26 NOTE — H&P
Patient seen and examined by me prior to the procedure. The patient is stable for sedation. There have been no significant changes since my initial consultation note. Please reference this note for full details    The patient was counseled at length about the risks of dillon Covid-19 during their perioperative period and any recovery window from their procedure. The patient was made aware that dillon Covid-19  may worsen their prognosis for recovering from their procedure  and lend to a higher morbidity and/or mortality risk. All material risks, benefits, and reasonable alternatives including postponing the procedure were discussed. The patient does wish to proceed with the procedure at this time.     ASA class-3  Malampati- 2

## 2023-12-26 NOTE — CARE COORDINATION
SW attempted to meet with patient at bedside but he already left for EGD. SW will visit room 4129 later today. Respectfully submitted,    Emiliana Wilhelm MSW, [facility]   [phone]    Electronically signed by MAITE Mcclendon, LSW on 12/26/2023 at 12:17 PM

## 2023-12-26 NOTE — ED NOTES
Spoke with Dr Donald Jauregui regarding consult, stated he will be in yo see pt today, no new orders at this time

## 2023-12-26 NOTE — OP NOTE
Endoscopy Note    Patient: Cody Garvey  : 1985  Acct#:     Procedure: Esophagogastroduodenoscopy with variceal band ligation                         Date:  2023     Surgeon:   Mere Moran DO    Referring Physician:  Sahil Palmer MD    Indications: This is a 41y.o. year old male who presents today with Hematemesis and Melena. History of cirrhosis. Postoperative Diagnosis:  1) There were 2 columns of medium Grade II esophageal varices without bleeding stigmata were noted in the distal esophagus. Using a [employer], 3 esophageal bands were successfully deployed. No immediate complications were noted during or after deployment of the bands. The gastroesophageal junction was at 43 cm from the incisors. 3) There were no gastric varices noted. 3) There was moderate portal gastropathy noted. 4) There was a normal appearing duodenal bulb and second portion of the duodenum. Anesthesia:  The patient was administered TIVA per anesthesiology team.  Please see their operative records for full details of medications administered. Consent:  The patient or their legal guardian has signed an informed consent, and is aware of the potential risks, benefits, alternatives, and potential complications of this procedure. These include, but are not limited to hemorrhage, bleeding, post procedural pain, perforation, phlebitis, aspiration, hypotension, hypoxia, cardiovascular events such as arryhthmia, and possibly death. Description of Procedure: The patient was then taken to the endoscopy suite, placed in the left lateral decubitus position and the above IV sedation was administrered. The Olympus video endoscope was placed through the patient's oropharynx without difficulty to the extent of the 2nd portion of the duodenum. Both forward and retroflexed views of the stomach were obtained.       Findings:     1) There were 2 columns of medium Grade II esophageal varices without bleeding stigmata were noted in the distal esophagus.  Using a [employer] multiband ligator, 3 esophageal bands were successfully deployed.  No immediate complications were noted during or after deployment of the bands.  The gastroesophageal junction was at 43 cm from the incisors. 3) There were no gastric varices noted.  3) There was moderate portal gastropathy noted.  4) There was a normal appearing duodenal bulb and second portion of the duodenum.     The scope was then withdrawn back into the stomach, it was decompressed, and the scope was completely withdrawn.    The patient tolerated the procedure well and was taken to the post anesthesia care unit in good condition.    Estimated blood loss: <5ml    * No specimens in log *        Impression:   1) See post procedure diagnoses    Recommendations:   1) Clear liquid diet.  2) Start octreotide gtt for next 48-72 hours  3) PPI bid for next 3-4 weeks, then daily  4) CBC daily  5) Monitor vitals  6) Patient needs Etoh rehab  7) Continue Rocephin for now  8) Patient may need eradication of HCV.  Rechecking HCV RNA  9) Patient would benefit from outpatient vaccination for Hepatitis A.         Jitendra Farmer Jr, DO  [facility]  [phone]

## 2023-12-26 NOTE — H&P
[address], [address]                              HISTORY AND PHYSICAL    PATIENT NAME: Domingo Pemberton                     :        1985  MED REC NO:   [id number]                          ROOM:  ACCOUNT NO:   [de-identified]                           ADMIT DATE: 2023  PROVIDER:     Kristen Belcher MD    ATTENDING PROVIDERS:  Chin Borja MD and Kristen Belcher MD.    CHIEF COMPLAINT:  Abdominal pain. HISTORY OF PRESENT ILLNESS:  This 41-year-old male patient came to  emergency room because of increased abdominal pain. It is going on for  at least few weeks; in the last two weeks pain was getting worse, some  nausea, but the patient also acknowledged that he likes beer, indulge  drinking alcohol. The patient has history of hepatitis. He has been  followed up in the past by Dr. Shana Rahman. The patient also has a history of  pancreatitis. The patient has at this time no nausea or vomiting, no  dark-colored stool. The patient is not shaking at this time. Sleepy,  but coherent and able to answer questions. The patient is taking some  Suboxone, but I do not know why unable to tell me and why he was taking  it. It is too sleepy and tired. The patient was found to have some  ascending and transverse colon inflammation. Pancreas is okay. The  patient was admitted for further evaluation and treatment on [facility]  protocol. The patient has girlfriend who is in the room. PAST MEDICAL HISTORY:  Significant for history of pancreatitis, smoking,  drinking, chololithiasis, some chronic pain. The patient has also  history of leg pain, chronic right leg pain. FAMILY HISTORY:  Noncontributory. SOCIAL HISTORY:  The patient is single. He is smoking and drinking. Has girlfriend. The patient also drinks beer and smoking marijuana. MEDICATIONS:  See the reconciliation sheet.   No current facility-administered
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
## 2023-12-26 NOTE — CARE COORDINATION
SW attempted to meet with patient this morning, however, he was asleep. SW will try again later if time permits. Respectfully submitted,    Emiliana ARORA, [facility]   [phone]    Electronically signed by MAITE Duran, LSW on 12/26/2023 at 9:34 AM

## 2023-12-26 NOTE — CARE COORDINATION
Case Management Assessment  Initial Evaluation    Date/Time of Evaluation: 12/26/2023 1:49 PM  Assessment Completed by: MAITE Pearl, DENISHA    If patient is discharged prior to next notation, then this note serves as note for discharge by case management. Patient Name: Willy Fernandez                   YOB: 1985  Diagnosis: Alcoholism ([address]) [F10.20]  Colitis [K52.9]  Upper GI bleed [K92.2]  Abnormal CT scan, gallbladder [R93.2]  Hematemesis, unspecified whether nausea present [K92.0]                   Date / Time: 12/25/2023  6:24 PM    Patient Admission Status: Inpatient   Readmission Risk (Low < 19, Mod (19-27), High > 27): Readmission Risk Score: 13    Current PCP: Jay Andrade MD  PCP verified by CM? Yes    Chart Reviewed: Yes      History Provided by: Spouse  Patient Orientation: Unable to Assess (Patient asleep.)    Patient Cognition: Alert    Hospitalization in the last 30 days (Readmission):  No    If yes, Readmission Assessment in  Navigator will be completed. Advance Directives:      Code Status: Full Code   Patient's Primary Decision Maker is: Legal Next of Kin      Discharge Planning:    Patient lives with: Spouse/Significant Other, Family Members Type of Home: House  Primary Care Giver: Self  Patient Support Systems include: Spouse/Significant Other, Parent, Family Members   Current Financial resources: Medicaid  Current community resources: None  Current services prior to admission: None            Current DME:              Type of Home Care services:  None    ADLS  Prior functional level: Independent in ADLs/IADLs  Current functional level: Independent in ADLs/IADLs    PT AM-PAC:   /24  OT AM-PAC:   /24    Family can provide assistance at DC: Yes  Would you like Case Management to discuss the discharge plan with any other family members/significant others, and if so, who?  Yes (wife if needed.)  Plans to Return to Present Housing: Yes  Other Identified Issues/Barriers     Respectfully submitted,    Emiliana ARORA, JULIENNE  [facility]   [phone]    Electronically signed by MAITE Rosas, LSW on 12/26/2023 at 1:49 PM
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
## 2023-12-26 NOTE — ED NOTES
Pt resting in bed at this time, laying in a supine position with head of bed elevated . Call light remains in reach instructed pt how to use, and encouraged pt to call if needed assistance, no distress noted. RR even and unlabored, skin warm and dry. No needs at this time. Will continue to monitor closely.   Family remains at bedside

## 2023-12-26 NOTE — ANESTHESIA POSTPROCEDURE EVALUATION
Department of Anesthesiology  Postprocedure Note    Patient: Melvin Horn  MRN: [id number]  YOB: 1985  Date of evaluation: 12/26/2023    Procedure Summary       Date: 12/26/23 Room / Location: [address] [place] [address]    Anesthesia Start: 1100 Anesthesia Stop: 1121    Procedure: EGD ESOPHAGOGASTRODUODENOSCOPY ENDOSCOPIC VARICEAL TREATMENT W/ BANDING Diagnosis: Hepatic cirrhosis, unspecified hepatic cirrhosis type, unspecified whether ascites present [facility])    Surgeons: Roque Mims DO Responsible Provider: Teresa Martini MD    Anesthesia Type: MAC ASA Status: 3            Anesthesia Type: No value filed. Wilman Phase I: Wilman Score: 10    Wilman Phase II:      Anesthesia Post Evaluation    Patient location during evaluation: PACU  Patient participation: complete - patient participated  Level of consciousness: awake and alert  Airway patency: patent  Nausea & Vomiting: no nausea and no vomiting  Cardiovascular status: hemodynamically stable  Respiratory status: acceptable  Hydration status: stable  Pain management: adequate    No notable events documented.

## 2023-12-26 NOTE — CARE COORDINATION
SW consult noted for consideration of rehab. SW will arrive at bedside momentarily to assess and offer resources if he is agreeable. Respectfully submitted,     Emiliana ARORA, [facility]   [phone]     Electronically signed by MAITE Goode, LSW on 12/26/2023 at 8:36 AM

## 2023-12-27 PROCEDURE — 6370000000 HC RX 637 (ALT 250 FOR IP): Performed by: SPECIALIST

## 2023-12-27 PROCEDURE — 6360000002 HC RX W HCPCS: Performed by: INTERNAL MEDICINE

## 2023-12-27 PROCEDURE — 6360000002 HC RX W HCPCS: Performed by: SPECIALIST

## 2023-12-27 PROCEDURE — 6370000000 HC RX 637 (ALT 250 FOR IP): Performed by: INTERNAL MEDICINE

## 2023-12-27 PROCEDURE — 99254 IP/OBS CNSLTJ NEW/EST MOD 60: CPT | Performed by: SURGERY

## 2023-12-27 PROCEDURE — 2580000003 HC RX 258: Performed by: INTERNAL MEDICINE

## 2023-12-27 PROCEDURE — 1200000000 HC SEMI PRIVATE

## 2023-12-27 RX ORDER — BUPRENORPHINE HYDROCHLORIDE AND NALOXONE HYDROCHLORIDE DIHYDRATE 8; 2 MG/1; MG/1
1 TABLET SUBLINGUAL 2 TIMES DAILY
Status: DISCONTINUED | OUTPATIENT
Start: 2023-12-27 | End: 2023-12-28 | Stop reason: HOSPADM

## 2023-12-27 RX ADMIN — BUPRENORPHINE HYDROCHLORIDE AND NALOXONE HYDROCHLORIDE DIHYDRATE 1 TABLET: 8; 2 TABLET SUBLINGUAL at 10:55

## 2023-12-27 RX ADMIN — BUPRENORPHINE HYDROCHLORIDE AND NALOXONE HYDROCHLORIDE DIHYDRATE 1 TABLET: 8; 2 TABLET SUBLINGUAL at 20:49

## 2023-12-27 RX ADMIN — MORPHINE SULFATE 2 MG: 2 INJECTION, SOLUTION INTRAMUSCULAR; INTRAVENOUS at 06:17

## 2023-12-27 RX ADMIN — Medication 100 MG: at 08:25

## 2023-12-27 RX ADMIN — POTASSIUM CHLORIDE 10 MEQ: 750 TABLET, FILM COATED, EXTENDED RELEASE ORAL at 08:25

## 2023-12-27 RX ADMIN — CYCLOBENZAPRINE 5 MG: 10 TABLET, FILM COATED ORAL at 20:49

## 2023-12-27 RX ADMIN — POTASSIUM CHLORIDE 10 MEQ: 750 TABLET, FILM COATED, EXTENDED RELEASE ORAL at 20:49

## 2023-12-27 RX ADMIN — PANTOPRAZOLE SODIUM 40 MG: 40 TABLET, DELAYED RELEASE ORAL at 06:12

## 2023-12-27 RX ADMIN — FOLIC ACID 1 MG: 1 TABLET ORAL at 08:25

## 2023-12-27 RX ADMIN — LORAZEPAM 1 MG: 1 TABLET ORAL at 12:10

## 2023-12-27 RX ADMIN — GABAPENTIN 300 MG: 300 CAPSULE ORAL at 16:46

## 2023-12-27 RX ADMIN — GABAPENTIN 300 MG: 300 CAPSULE ORAL at 20:49

## 2023-12-27 RX ADMIN — LORAZEPAM 1 MG: 1 TABLET ORAL at 08:35

## 2023-12-27 RX ADMIN — CEFTRIAXONE SODIUM 2000 MG: 2 INJECTION, POWDER, FOR SOLUTION INTRAMUSCULAR; INTRAVENOUS at 08:42

## 2023-12-27 RX ADMIN — OCTREOTIDE ACETATE 25 MCG/HR: 500 INJECTION, SOLUTION INTRAVENOUS; SUBCUTANEOUS at 12:12

## 2023-12-27 RX ADMIN — LORAZEPAM 1 MG: 1 TABLET ORAL at 20:49

## 2023-12-27 RX ADMIN — FLUTICASONE PROPIONATE 1 SPRAY: 50 SPRAY, METERED NASAL at 08:25

## 2023-12-27 RX ADMIN — LORAZEPAM 1 MG: 1 TABLET ORAL at 18:15

## 2023-12-27 RX ADMIN — PRAZOSIN HYDROCHLORIDE 2 MG: 1 CAPSULE ORAL at 20:49

## 2023-12-27 RX ADMIN — GABAPENTIN 300 MG: 300 CAPSULE ORAL at 08:25

## 2023-12-27 RX ADMIN — CETIRIZINE HYDROCHLORIDE 10 MG: 10 TABLET ORAL at 08:25

## 2023-12-27 RX ADMIN — PANTOPRAZOLE SODIUM 40 MG: 40 TABLET, DELAYED RELEASE ORAL at 16:46

## 2023-12-27 ASSESSMENT — PAIN DESCRIPTION - LOCATION: LOCATION: THROAT

## 2023-12-27 ASSESSMENT — PAIN SCALES - GENERAL
PAINLEVEL_OUTOF10: 7
PAINLEVEL_OUTOF10: 0

## 2023-12-27 ASSESSMENT — PAIN DESCRIPTION - ORIENTATION: ORIENTATION: MID;UPPER

## 2023-12-27 ASSESSMENT — PAIN - FUNCTIONAL ASSESSMENT: PAIN_FUNCTIONAL_ASSESSMENT: ACTIVITIES ARE NOT PREVENTED

## 2023-12-27 ASSESSMENT — PAIN DESCRIPTION - DESCRIPTORS: DESCRIPTORS: ACHING;THROBBING

## 2023-12-27 NOTE — CARE COORDINATION
12/27 [facility] T. [facility] Specialist see patient. Patient is currently active with [facility] ([facility]). He gets his Suboxone there. His wife Kaity Rivers plans to discuss alcohol cessation medication from [facility]. Electronically signed by Antoine Suarez RN on 12/27/2023 at 3:14 PM

## 2023-12-27 NOTE — CARE COORDINATION
12/27 Plan: Return to home with wife. ETOH Abuse-s/p Variceal Banding 12/26. On Suboxone. Octreotide drip, IV ABX. Needs treatment resources.  Electronically signed by Ester Oliveira RN on 12/27/2023 at 10:14 AM

## 2023-12-28 VITALS
RESPIRATION RATE: 20 BRPM | WEIGHT: 209.9 LBS | DIASTOLIC BLOOD PRESSURE: 73 MMHG | SYSTOLIC BLOOD PRESSURE: 126 MMHG | HEART RATE: 98 BPM | HEIGHT: 69 IN | BODY MASS INDEX: 31.09 KG/M2 | OXYGEN SATURATION: 94 % | TEMPERATURE: 98.5 F

## 2023-12-28 LAB
ALBUMIN SERPL-MCNC: 3.1 G/DL (ref 3.4–5)
ALBUMIN/GLOB SERPL: 1 {RATIO} (ref 1.1–2.2)
ALP SERPL-CCNC: 89 U/L (ref 40–129)
ALT SERPL-CCNC: 37 U/L (ref 10–40)
ANION GAP SERPL CALCULATED.3IONS-SCNC: 7 MMOL/L (ref 3–16)
AST SERPL-CCNC: 121 U/L (ref 15–37)
BILIRUB SERPL-MCNC: 4.6 MG/DL (ref 0–1)
BUN SERPL-MCNC: 6 MG/DL (ref 7–20)
CALCIUM SERPL-MCNC: 8 MG/DL (ref 8.3–10.6)
CHLORIDE SERPL-SCNC: 104 MMOL/L (ref 99–110)
CO2 SERPL-SCNC: 27 MMOL/L (ref 21–32)
CREAT SERPL-MCNC: 0.6 MG/DL (ref 0.9–1.3)
DEPRECATED RDW RBC AUTO: 15.3 % (ref 12.4–15.4)
GFR SERPLBLD CREATININE-BSD FMLA CKD-EPI: >60 ML/MIN/{1.73_M2}
GLUCOSE SERPL-MCNC: 111 MG/DL (ref 70–99)
HCT VFR BLD AUTO: 22.9 % (ref 40.5–52.5)
HCV RNA SERPL NAA+PROBE-ACNC: ABNORMAL IU/ML
HCV RNA SERPL NAA+PROBE-LOG IU: <1 LOG IU/ML
HCV RNA SERPL QL NAA+PROBE: DETECTED
HGB BLD-MCNC: 8 G/DL (ref 13.5–17.5)
MCH RBC QN AUTO: 33.7 PG (ref 26–34)
MCHC RBC AUTO-ENTMCNC: 34.8 G/DL (ref 31–36)
MCV RBC AUTO: 96.9 FL (ref 80–100)
PLATELET # BLD AUTO: 56 K/UL (ref 135–450)
PMV BLD AUTO: 9.5 FL (ref 5–10.5)
POTASSIUM SERPL-SCNC: 3.8 MMOL/L (ref 3.5–5.1)
PROT SERPL-MCNC: 6.2 G/DL (ref 6.4–8.2)
RBC # BLD AUTO: 2.36 M/UL (ref 4.2–5.9)
SODIUM SERPL-SCNC: 138 MMOL/L (ref 136–145)
WBC # BLD AUTO: 2.4 K/UL (ref 4–11)

## 2023-12-28 PROCEDURE — 85027 COMPLETE CBC AUTOMATED: CPT

## 2023-12-28 PROCEDURE — 36415 COLL VENOUS BLD VENIPUNCTURE: CPT

## 2023-12-28 PROCEDURE — 6370000000 HC RX 637 (ALT 250 FOR IP): Performed by: INTERNAL MEDICINE

## 2023-12-28 PROCEDURE — 80053 COMPREHEN METABOLIC PANEL: CPT

## 2023-12-28 PROCEDURE — 6370000000 HC RX 637 (ALT 250 FOR IP): Performed by: SPECIALIST

## 2023-12-28 RX ORDER — CEFUROXIME AXETIL 500 MG/1
500 TABLET ORAL 2 TIMES DAILY
Qty: 10 TABLET | Refills: 0 | Status: SHIPPED | OUTPATIENT
Start: 2023-12-28 | End: 2024-01-02

## 2023-12-28 RX ORDER — PANTOPRAZOLE SODIUM 40 MG/1
40 TABLET, DELAYED RELEASE ORAL
Qty: 30 TABLET | Refills: 3 | Status: SHIPPED | OUTPATIENT
Start: 2023-12-28

## 2023-12-28 RX ORDER — CARVEDILOL 3.12 MG/1
3.12 TABLET ORAL 2 TIMES DAILY WITH MEALS
Status: DISCONTINUED | OUTPATIENT
Start: 2023-12-28 | End: 2023-12-28 | Stop reason: HOSPADM

## 2023-12-28 RX ADMIN — TRAMADOL HYDROCHLORIDE 50 MG: 50 TABLET ORAL at 04:02

## 2023-12-28 RX ADMIN — LORAZEPAM 1 MG: 1 TABLET ORAL at 04:02

## 2023-12-28 ASSESSMENT — PAIN - FUNCTIONAL ASSESSMENT: PAIN_FUNCTIONAL_ASSESSMENT: ACTIVITIES ARE NOT PREVENTED

## 2023-12-28 ASSESSMENT — PAIN DESCRIPTION - LOCATION: LOCATION: THROAT;ABDOMEN

## 2023-12-28 ASSESSMENT — PAIN SCALES - GENERAL: PAINLEVEL_OUTOF10: 7

## 2023-12-28 NOTE — CARE COORDINATION
DISCHARGE SUMMARY     DATE OF DISCHARGE: 12/28/23    DISCHARGE DESTINATION: Home with wife    HOME CARE: No    HEMODIALYSIS: No    TRANSPORTATION: Private Car    NEW DME ORDERED: no    COMMENTS: Discharge to home with wife. Patient will resume outpatient Drug and Alcohol treatment with the [facility].  Electronically signed by Waleksa Ha RN on 12/28/2023 at 9:22 AM
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
## 2023-12-28 NOTE — PLAN OF CARE
Problem: Discharge Planning  Goal: Discharge to home or other facility with appropriate resources  Outcome: Progressing     Problem: Pain  Goal: Verbalizes/displays adequate comfort level or baseline comfort level  Outcome: Progressing     Problem: Gastrointestinal - Adult  Goal: Minimal or absence of nausea and vomiting  Outcome: Progressing     Problem: Gastrointestinal - Adult  Goal: Maintains or returns to baseline bowel function  Outcome: Progressing     Problem: Gastrointestinal - Adult  Goal: Maintains adequate nutritional intake  Outcome: Progressing     Problem: Infection - Adult  Goal: Absence of infection at discharge  Outcome: Progressing     Problem: Metabolic/Fluid and Electrolytes - Adult  Goal: Electrolytes maintained within normal limits  Outcome: Progressing     Problem: Safety - Adult  Goal: Free from fall injury  Outcome: Progressing     Problem: Chronic Conditions and Co-morbidities  Goal: Patient's chronic conditions and co-morbidity symptoms are monitored and maintained or improved  Outcome: Progressing     Problem: Anxiety  Goal: Will report anxiety at manageable levels  Description: INTERVENTIONS:  1. Administer medication as ordered  2. Teach and rehearse alternative coping skills  3.  Provide emotional support with 1:1 interaction with staff  Outcome: Progressing

## 2023-12-29 LAB — AFP-TM SERPL-MCNC: 3.6 UG/L

## 2024-01-23 ENCOUNTER — ANESTHESIA (OUTPATIENT)
Dept: ENDOSCOPY | Age: 39
DRG: 242 | End: 2024-01-23
Payer: COMMERCIAL

## 2024-01-23 ENCOUNTER — ANESTHESIA EVENT (OUTPATIENT)
Dept: ENDOSCOPY | Age: 39
DRG: 242 | End: 2024-01-23
Payer: COMMERCIAL

## 2024-01-23 ENCOUNTER — HOSPITAL ENCOUNTER (INPATIENT)
Age: 39
LOS: 1 days | Discharge: LEFT BEFORE DISCHARGE | DRG: 242 | End: 2024-01-24
Attending: EMERGENCY MEDICINE | Admitting: INTERNAL MEDICINE
Payer: COMMERCIAL

## 2024-01-23 DIAGNOSIS — K76.9 CHRONIC LIVER DISEASE: ICD-10-CM

## 2024-01-23 DIAGNOSIS — D61.818 PANCYTOPENIA (HCC): ICD-10-CM

## 2024-01-23 DIAGNOSIS — K92.0 HEMATEMESIS WITH NAUSEA: Primary | ICD-10-CM

## 2024-01-23 DIAGNOSIS — F10.90 ALCOHOL USE DISORDER: ICD-10-CM

## 2024-01-23 PROBLEM — K92.2 GI BLEED: Status: ACTIVE | Noted: 2024-01-23

## 2024-01-23 LAB
ABO + RH BLD: NORMAL
ALBUMIN SERPL-MCNC: 3.6 G/DL (ref 3.4–5)
ALBUMIN/GLOB SERPL: 1.2 {RATIO} (ref 1.1–2.2)
ALP SERPL-CCNC: 111 U/L (ref 40–129)
ALT SERPL-CCNC: 26 U/L (ref 10–40)
ANION GAP SERPL CALCULATED.3IONS-SCNC: 9 MMOL/L (ref 3–16)
APTT BLD: 45.2 SEC (ref 22.7–35.9)
AST SERPL-CCNC: 92 U/L (ref 15–37)
BASOPHILS # BLD: 0 K/UL (ref 0–0.2)
BASOPHILS NFR BLD: 1.2 %
BILIRUB SERPL-MCNC: 3.2 MG/DL (ref 0–1)
BILIRUB UR QL STRIP.AUTO: ABNORMAL
BLD GP AB SCN SERPL QL: NORMAL
BUN SERPL-MCNC: 4 MG/DL (ref 7–20)
CALCIUM SERPL-MCNC: 8.6 MG/DL (ref 8.3–10.6)
CHLORIDE SERPL-SCNC: 102 MMOL/L (ref 99–110)
CLARITY UR: CLEAR
CO2 SERPL-SCNC: 24 MMOL/L (ref 21–32)
COLOR UR: ABNORMAL
CREAT SERPL-MCNC: 0.6 MG/DL (ref 0.9–1.3)
DEPRECATED RDW RBC AUTO: 15.6 % (ref 12.4–15.4)
EKG ATRIAL RATE: 114 BPM
EKG DIAGNOSIS: NORMAL
EKG P AXIS: 44 DEGREES
EKG P-R INTERVAL: 134 MS
EKG Q-T INTERVAL: 340 MS
EKG QRS DURATION: 84 MS
EKG QTC CALCULATION (BAZETT): 468 MS
EKG R AXIS: 49 DEGREES
EKG T AXIS: 48 DEGREES
EKG VENTRICULAR RATE: 114 BPM
EOSINOPHIL # BLD: 0.1 K/UL (ref 0–0.6)
EOSINOPHIL NFR BLD: 4.2 %
ETHANOLAMINE SERPL-MCNC: 187 MG/DL (ref 0–0.08)
FERRITIN SERPL IA-MCNC: 79.7 NG/ML (ref 30–400)
FIBRINOGEN PPP-MCNC: 141 MG/DL (ref 243–550)
FOLATE SERPL-MCNC: 8.84 NG/ML (ref 4.78–24.2)
GFR SERPLBLD CREATININE-BSD FMLA CKD-EPI: >60 ML/MIN/{1.73_M2}
GLUCOSE SERPL-MCNC: 112 MG/DL (ref 70–99)
GLUCOSE UR STRIP.AUTO-MCNC: NEGATIVE MG/DL
HAPTOGLOB SERPL-MCNC: <10 MG/DL (ref 30–200)
HCT VFR BLD AUTO: 27.5 % (ref 40.5–52.5)
HCT VFR BLD AUTO: 28.3 % (ref 40.5–52.5)
HGB BLD-MCNC: 9.4 G/DL (ref 13.5–17.5)
HGB BLD-MCNC: 9.6 G/DL (ref 13.5–17.5)
HGB UR QL STRIP.AUTO: NEGATIVE
INR PPP: 1.81 (ref 0.84–1.16)
IRON SATN MFR SERPL: 19 % (ref 20–50)
IRON SERPL-MCNC: 61 UG/DL (ref 59–158)
KETONES UR STRIP.AUTO-MCNC: ABNORMAL MG/DL
LDH SERPL L TO P-CCNC: 233 U/L (ref 100–190)
LEUKOCYTE ESTERASE UR QL STRIP.AUTO: NEGATIVE
LIPASE SERPL-CCNC: 58 U/L (ref 13–60)
LYMPHOCYTES # BLD: 1.2 K/UL (ref 1–5.1)
LYMPHOCYTES NFR BLD: 33.9 %
MAGNESIUM SERPL-MCNC: 1.8 MG/DL (ref 1.8–2.4)
MCH RBC QN AUTO: 32.6 PG (ref 26–34)
MCHC RBC AUTO-ENTMCNC: 33.8 G/DL (ref 31–36)
MCV RBC AUTO: 96.5 FL (ref 80–100)
MONOCYTES # BLD: 0.4 K/UL (ref 0–1.3)
MONOCYTES NFR BLD: 12.5 %
NEUTROPHILS # BLD: 1.6 K/UL (ref 1.7–7.7)
NEUTROPHILS NFR BLD: 48.2 %
NITRITE UR QL STRIP.AUTO: NEGATIVE
PH UR STRIP.AUTO: 6 [PH] (ref 5–8)
PHOSPHATE SERPL-MCNC: 3.8 MG/DL (ref 2.5–4.9)
PLATELET # BLD AUTO: 69 K/UL (ref 135–450)
PMV BLD AUTO: 9.3 FL (ref 5–10.5)
POTASSIUM SERPL-SCNC: 3.5 MMOL/L (ref 3.5–5.1)
PROT SERPL-MCNC: 6.7 G/DL (ref 6.4–8.2)
PROT UR STRIP.AUTO-MCNC: NEGATIVE MG/DL
PROTHROMBIN TIME: 20.9 SEC (ref 11.5–14.8)
RBC # BLD AUTO: 2.94 M/UL (ref 4.2–5.9)
SODIUM SERPL-SCNC: 135 MMOL/L (ref 136–145)
SP GR UR STRIP.AUTO: 1.02 (ref 1–1.03)
TIBC SERPL-MCNC: 321 UG/DL (ref 260–445)
UA DIPSTICK W REFLEX MICRO PNL UR: ABNORMAL
URN SPEC COLLECT METH UR: ABNORMAL
UROBILINOGEN UR STRIP-ACNC: 2 E.U./DL
VIT B12 SERPL-MCNC: 1451 PG/ML (ref 211–911)
WBC # BLD AUTO: 3.4 K/UL (ref 4–11)

## 2024-01-23 PROCEDURE — 96375 TX/PRO/DX INJ NEW DRUG ADDON: CPT

## 2024-01-23 PROCEDURE — 2500000003 HC RX 250 WO HCPCS

## 2024-01-23 PROCEDURE — 85018 HEMOGLOBIN: CPT

## 2024-01-23 PROCEDURE — 86900 BLOOD TYPING SEROLOGIC ABO: CPT

## 2024-01-23 PROCEDURE — 3609017100 HC EGD: Performed by: INTERNAL MEDICINE

## 2024-01-23 PROCEDURE — 99223 1ST HOSP IP/OBS HIGH 75: CPT | Performed by: INTERNAL MEDICINE

## 2024-01-23 PROCEDURE — 83540 ASSAY OF IRON: CPT

## 2024-01-23 PROCEDURE — 2580000003 HC RX 258: Performed by: EMERGENCY MEDICINE

## 2024-01-23 PROCEDURE — 6370000000 HC RX 637 (ALT 250 FOR IP): Performed by: INTERNAL MEDICINE

## 2024-01-23 PROCEDURE — 83735 ASSAY OF MAGNESIUM: CPT

## 2024-01-23 PROCEDURE — 85025 COMPLETE CBC W/AUTO DIFF WBC: CPT

## 2024-01-23 PROCEDURE — 84100 ASSAY OF PHOSPHORUS: CPT

## 2024-01-23 PROCEDURE — 96366 THER/PROPH/DIAG IV INF ADDON: CPT

## 2024-01-23 PROCEDURE — 6360000002 HC RX W HCPCS: Performed by: EMERGENCY MEDICINE

## 2024-01-23 PROCEDURE — 3700000000 HC ANESTHESIA ATTENDED CARE: Performed by: INTERNAL MEDICINE

## 2024-01-23 PROCEDURE — 36415 COLL VENOUS BLD VENIPUNCTURE: CPT

## 2024-01-23 PROCEDURE — 82077 ASSAY SPEC XCP UR&BREATH IA: CPT

## 2024-01-23 PROCEDURE — 7100000000 HC PACU RECOVERY - FIRST 15 MIN: Performed by: INTERNAL MEDICINE

## 2024-01-23 PROCEDURE — 81003 URINALYSIS AUTO W/O SCOPE: CPT

## 2024-01-23 PROCEDURE — 6360000002 HC RX W HCPCS

## 2024-01-23 PROCEDURE — 2060000000 HC ICU INTERMEDIATE R&B

## 2024-01-23 PROCEDURE — 93005 ELECTROCARDIOGRAM TRACING: CPT | Performed by: EMERGENCY MEDICINE

## 2024-01-23 PROCEDURE — 86901 BLOOD TYPING SEROLOGIC RH(D): CPT

## 2024-01-23 PROCEDURE — 85384 FIBRINOGEN ACTIVITY: CPT

## 2024-01-23 PROCEDURE — 83010 ASSAY OF HAPTOGLOBIN QUANT: CPT

## 2024-01-23 PROCEDURE — A4216 STERILE WATER/SALINE, 10 ML: HCPCS | Performed by: EMERGENCY MEDICINE

## 2024-01-23 PROCEDURE — 2709999900 HC NON-CHARGEABLE SUPPLY: Performed by: INTERNAL MEDICINE

## 2024-01-23 PROCEDURE — 93010 ELECTROCARDIOGRAM REPORT: CPT | Performed by: INTERNAL MEDICINE

## 2024-01-23 PROCEDURE — 83550 IRON BINDING TEST: CPT

## 2024-01-23 PROCEDURE — 99285 EMERGENCY DEPT VISIT HI MDM: CPT

## 2024-01-23 PROCEDURE — 7100000001 HC PACU RECOVERY - ADDTL 15 MIN: Performed by: INTERNAL MEDICINE

## 2024-01-23 PROCEDURE — 85730 THROMBOPLASTIN TIME PARTIAL: CPT

## 2024-01-23 PROCEDURE — C9113 INJ PANTOPRAZOLE SODIUM, VIA: HCPCS | Performed by: EMERGENCY MEDICINE

## 2024-01-23 PROCEDURE — 80053 COMPREHEN METABOLIC PANEL: CPT

## 2024-01-23 PROCEDURE — 85610 PROTHROMBIN TIME: CPT

## 2024-01-23 PROCEDURE — 86850 RBC ANTIBODY SCREEN: CPT

## 2024-01-23 PROCEDURE — 96365 THER/PROPH/DIAG IV INF INIT: CPT

## 2024-01-23 PROCEDURE — 96372 THER/PROPH/DIAG INJ SC/IM: CPT

## 2024-01-23 PROCEDURE — 3700000001 HC ADD 15 MINUTES (ANESTHESIA): Performed by: INTERNAL MEDICINE

## 2024-01-23 PROCEDURE — 83615 LACTATE (LD) (LDH) ENZYME: CPT

## 2024-01-23 PROCEDURE — 96368 THER/DIAG CONCURRENT INF: CPT

## 2024-01-23 PROCEDURE — 83690 ASSAY OF LIPASE: CPT

## 2024-01-23 PROCEDURE — 82728 ASSAY OF FERRITIN: CPT

## 2024-01-23 PROCEDURE — 2580000003 HC RX 258

## 2024-01-23 PROCEDURE — 85014 HEMATOCRIT: CPT

## 2024-01-23 PROCEDURE — 2580000003 HC RX 258: Performed by: INTERNAL MEDICINE

## 2024-01-23 PROCEDURE — 82746 ASSAY OF FOLIC ACID SERUM: CPT

## 2024-01-23 PROCEDURE — 82607 VITAMIN B-12: CPT

## 2024-01-23 PROCEDURE — 0DJ08ZZ INSPECTION OF UPPER INTESTINAL TRACT, VIA NATURAL OR ARTIFICIAL OPENING ENDOSCOPIC: ICD-10-PCS | Performed by: INTERNAL MEDICINE

## 2024-01-23 RX ORDER — NICOTINE 21 MG/24HR
1 PATCH, TRANSDERMAL 24 HOURS TRANSDERMAL DAILY
Status: DISCONTINUED | OUTPATIENT
Start: 2024-01-23 | End: 2024-01-24 | Stop reason: HOSPADM

## 2024-01-23 RX ORDER — SODIUM CHLORIDE 0.9 % (FLUSH) 0.9 %
5-40 SYRINGE (ML) INJECTION PRN
Status: DISCONTINUED | OUTPATIENT
Start: 2024-01-23 | End: 2024-01-24 | Stop reason: HOSPADM

## 2024-01-23 RX ORDER — SODIUM CHLORIDE 9 MG/ML
INJECTION, SOLUTION INTRAVENOUS CONTINUOUS PRN
Status: DISCONTINUED | OUTPATIENT
Start: 2024-01-23 | End: 2024-01-23 | Stop reason: SDUPTHER

## 2024-01-23 RX ORDER — SODIUM CHLORIDE 0.9 % (FLUSH) 0.9 %
5-40 SYRINGE (ML) INJECTION EVERY 12 HOURS SCHEDULED
Status: DISCONTINUED | OUTPATIENT
Start: 2024-01-23 | End: 2024-01-24 | Stop reason: HOSPADM

## 2024-01-23 RX ORDER — THIAMINE HYDROCHLORIDE 100 MG/ML
100 INJECTION, SOLUTION INTRAMUSCULAR; INTRAVENOUS ONCE
Status: COMPLETED | OUTPATIENT
Start: 2024-01-23 | End: 2024-01-23

## 2024-01-23 RX ORDER — LORAZEPAM 2 MG/ML
1 INJECTION INTRAMUSCULAR
Status: DISCONTINUED | OUTPATIENT
Start: 2024-01-23 | End: 2024-01-24 | Stop reason: HOSPADM

## 2024-01-23 RX ORDER — PHENYLEPHRINE HCL IN 0.9% NACL 1 MG/10 ML
SYRINGE (ML) INTRAVENOUS PRN
Status: DISCONTINUED | OUTPATIENT
Start: 2024-01-23 | End: 2024-01-23 | Stop reason: SDUPTHER

## 2024-01-23 RX ORDER — OCTREOTIDE ACETATE 100 UG/ML
50 INJECTION, SOLUTION INTRAVENOUS; SUBCUTANEOUS ONCE
Status: COMPLETED | OUTPATIENT
Start: 2024-01-23 | End: 2024-01-23

## 2024-01-23 RX ORDER — SUCCINYLCHOLINE/SOD CL,ISO/PF 200MG/10ML
SYRINGE (ML) INTRAVENOUS PRN
Status: DISCONTINUED | OUTPATIENT
Start: 2024-01-23 | End: 2024-01-23 | Stop reason: SDUPTHER

## 2024-01-23 RX ORDER — LORAZEPAM 1 MG/1
1 TABLET ORAL
Status: DISCONTINUED | OUTPATIENT
Start: 2024-01-23 | End: 2024-01-24 | Stop reason: HOSPADM

## 2024-01-23 RX ORDER — CYCLOBENZAPRINE HCL 10 MG
5 TABLET ORAL NIGHTLY
Status: DISCONTINUED | OUTPATIENT
Start: 2024-01-23 | End: 2024-01-24 | Stop reason: HOSPADM

## 2024-01-23 RX ORDER — LORAZEPAM 1 MG/1
4 TABLET ORAL
Status: DISCONTINUED | OUTPATIENT
Start: 2024-01-23 | End: 2024-01-24 | Stop reason: HOSPADM

## 2024-01-23 RX ORDER — CARVEDILOL 3.12 MG/1
3.12 TABLET ORAL 2 TIMES DAILY WITH MEALS
Status: DISCONTINUED | OUTPATIENT
Start: 2024-01-23 | End: 2024-01-24 | Stop reason: HOSPADM

## 2024-01-23 RX ORDER — PANTOPRAZOLE SODIUM 40 MG/1
40 TABLET, DELAYED RELEASE ORAL
Status: DISCONTINUED | OUTPATIENT
Start: 2024-01-23 | End: 2024-01-24 | Stop reason: HOSPADM

## 2024-01-23 RX ORDER — LORAZEPAM 2 MG/ML
4 INJECTION INTRAMUSCULAR
Status: DISCONTINUED | OUTPATIENT
Start: 2024-01-23 | End: 2024-01-24 | Stop reason: HOSPADM

## 2024-01-23 RX ORDER — PROPOFOL 10 MG/ML
INJECTION, EMULSION INTRAVENOUS PRN
Status: DISCONTINUED | OUTPATIENT
Start: 2024-01-23 | End: 2024-01-23 | Stop reason: SDUPTHER

## 2024-01-23 RX ORDER — SODIUM CHLORIDE 9 MG/ML
INJECTION, SOLUTION INTRAVENOUS PRN
Status: DISCONTINUED | OUTPATIENT
Start: 2024-01-23 | End: 2024-01-24 | Stop reason: HOSPADM

## 2024-01-23 RX ORDER — LIDOCAINE HYDROCHLORIDE 20 MG/ML
INJECTION, SOLUTION EPIDURAL; INFILTRATION; INTRACAUDAL; PERINEURAL PRN
Status: DISCONTINUED | OUTPATIENT
Start: 2024-01-23 | End: 2024-01-23 | Stop reason: SDUPTHER

## 2024-01-23 RX ORDER — PRAZOSIN HYDROCHLORIDE 1 MG/1
2 CAPSULE ORAL NIGHTLY
Status: DISCONTINUED | OUTPATIENT
Start: 2024-01-23 | End: 2024-01-24 | Stop reason: HOSPADM

## 2024-01-23 RX ORDER — SODIUM CHLORIDE 9 MG/ML
INJECTION, SOLUTION INTRAVENOUS CONTINUOUS
Status: DISCONTINUED | OUTPATIENT
Start: 2024-01-23 | End: 2024-01-24

## 2024-01-23 RX ORDER — DEXAMETHASONE SODIUM PHOSPHATE 4 MG/ML
INJECTION, SOLUTION INTRA-ARTICULAR; INTRALESIONAL; INTRAMUSCULAR; INTRAVENOUS; SOFT TISSUE PRN
Status: DISCONTINUED | OUTPATIENT
Start: 2024-01-23 | End: 2024-01-23 | Stop reason: SDUPTHER

## 2024-01-23 RX ORDER — GABAPENTIN 300 MG/1
300 CAPSULE ORAL 3 TIMES DAILY
Status: DISCONTINUED | OUTPATIENT
Start: 2024-01-23 | End: 2024-01-24 | Stop reason: HOSPADM

## 2024-01-23 RX ORDER — ONDANSETRON 2 MG/ML
INJECTION INTRAMUSCULAR; INTRAVENOUS PRN
Status: DISCONTINUED | OUTPATIENT
Start: 2024-01-23 | End: 2024-01-23 | Stop reason: SDUPTHER

## 2024-01-23 RX ORDER — MULTIVITAMIN WITH IRON
1 TABLET ORAL DAILY
Status: DISCONTINUED | OUTPATIENT
Start: 2024-01-23 | End: 2024-01-24 | Stop reason: HOSPADM

## 2024-01-23 RX ORDER — LORAZEPAM 2 MG/ML
2 INJECTION INTRAMUSCULAR ONCE
Status: DISCONTINUED | OUTPATIENT
Start: 2024-01-23 | End: 2024-01-24 | Stop reason: HOSPADM

## 2024-01-23 RX ORDER — FOLIC ACID 1 MG/1
1 TABLET ORAL DAILY
Status: DISCONTINUED | OUTPATIENT
Start: 2024-01-23 | End: 2024-01-24 | Stop reason: HOSPADM

## 2024-01-23 RX ORDER — LORAZEPAM 2 MG/ML
3 INJECTION INTRAMUSCULAR
Status: DISCONTINUED | OUTPATIENT
Start: 2024-01-23 | End: 2024-01-24 | Stop reason: HOSPADM

## 2024-01-23 RX ORDER — LORAZEPAM 1 MG/1
2 TABLET ORAL
Status: DISCONTINUED | OUTPATIENT
Start: 2024-01-23 | End: 2024-01-24 | Stop reason: HOSPADM

## 2024-01-23 RX ORDER — LORAZEPAM 1 MG/1
3 TABLET ORAL
Status: DISCONTINUED | OUTPATIENT
Start: 2024-01-23 | End: 2024-01-24 | Stop reason: HOSPADM

## 2024-01-23 RX ORDER — GAUZE BANDAGE 2" X 2"
100 BANDAGE TOPICAL DAILY
Status: DISCONTINUED | OUTPATIENT
Start: 2024-01-24 | End: 2024-01-24 | Stop reason: HOSPADM

## 2024-01-23 RX ORDER — LORAZEPAM 2 MG/ML
2 INJECTION INTRAMUSCULAR
Status: DISCONTINUED | OUTPATIENT
Start: 2024-01-23 | End: 2024-01-24 | Stop reason: HOSPADM

## 2024-01-23 RX ADMIN — Medication 200 MCG: at 13:48

## 2024-01-23 RX ADMIN — CEFTRIAXONE SODIUM 2000 MG: 2 INJECTION, POWDER, FOR SOLUTION INTRAMUSCULAR; INTRAVENOUS at 08:29

## 2024-01-23 RX ADMIN — PRAZOSIN HYDROCHLORIDE 2 MG: 1 CAPSULE ORAL at 21:13

## 2024-01-23 RX ADMIN — OCTREOTIDE ACETATE 50 MCG/HR: 500 INJECTION, SOLUTION INTRAVENOUS; SUBCUTANEOUS at 09:09

## 2024-01-23 RX ADMIN — GABAPENTIN 300 MG: 300 CAPSULE ORAL at 16:50

## 2024-01-23 RX ADMIN — CYCLOBENZAPRINE 5 MG: 10 TABLET, FILM COATED ORAL at 20:29

## 2024-01-23 RX ADMIN — SODIUM CHLORIDE: 9 INJECTION, SOLUTION INTRAVENOUS at 13:43

## 2024-01-23 RX ADMIN — FOLIC ACID 1 MG: 1 TABLET ORAL at 16:50

## 2024-01-23 RX ADMIN — PROPOFOL 200 MG: 10 INJECTION, EMULSION INTRAVENOUS at 13:43

## 2024-01-23 RX ADMIN — CARVEDILOL 3.12 MG: 3.12 TABLET, FILM COATED ORAL at 16:50

## 2024-01-23 RX ADMIN — ONDANSETRON 4 MG: 2 INJECTION INTRAMUSCULAR; INTRAVENOUS at 13:45

## 2024-01-23 RX ADMIN — THERA TABS 1 TABLET: TAB at 16:50

## 2024-01-23 RX ADMIN — LIDOCAINE HYDROCHLORIDE 100 MG: 20 INJECTION, SOLUTION EPIDURAL; INFILTRATION; INTRACAUDAL; PERINEURAL at 13:43

## 2024-01-23 RX ADMIN — Medication 140 MG: at 13:43

## 2024-01-23 RX ADMIN — THIAMINE HYDROCHLORIDE 100 MG: 100 INJECTION, SOLUTION INTRAMUSCULAR; INTRAVENOUS at 08:22

## 2024-01-23 RX ADMIN — PANTOPRAZOLE SODIUM 80 MG: 40 INJECTION, POWDER, FOR SOLUTION INTRAVENOUS at 08:25

## 2024-01-23 RX ADMIN — DEXAMETHASONE SODIUM PHOSPHATE 10 MG: 4 INJECTION, SOLUTION INTRAMUSCULAR; INTRAVENOUS at 13:45

## 2024-01-23 RX ADMIN — SODIUM CHLORIDE: 9 INJECTION, SOLUTION INTRAVENOUS at 20:29

## 2024-01-23 RX ADMIN — PANTOPRAZOLE SODIUM 40 MG: 40 TABLET, DELAYED RELEASE ORAL at 16:50

## 2024-01-23 RX ADMIN — GABAPENTIN 300 MG: 300 CAPSULE ORAL at 20:29

## 2024-01-23 RX ADMIN — OCTREOTIDE ACETATE 50 MCG/HR: 500 INJECTION, SOLUTION INTRAVENOUS; SUBCUTANEOUS at 21:13

## 2024-01-23 RX ADMIN — OCTREOTIDE ACETATE 50 MCG: 100 INJECTION, SOLUTION INTRAVENOUS; SUBCUTANEOUS at 08:31

## 2024-01-23 RX ADMIN — SODIUM CHLORIDE: 9 INJECTION, SOLUTION INTRAVENOUS at 14:46

## 2024-01-23 RX ADMIN — GLYCOPYRROLATE 0.1 MG: 0.2 INJECTION INTRAMUSCULAR; INTRAVENOUS at 14:02

## 2024-01-23 ASSESSMENT — LIFESTYLE VARIABLES
HOW OFTEN DO YOU HAVE A DRINK CONTAINING ALCOHOL: 2-4 TIMES A MONTH
HOW MANY STANDARD DRINKS CONTAINING ALCOHOL DO YOU HAVE ON A TYPICAL DAY: 3 OR 4

## 2024-01-23 ASSESSMENT — ENCOUNTER SYMPTOMS
SHORTNESS OF BREATH: 0
NAUSEA: 1
SHORTNESS OF BREATH: 0
ABDOMINAL PAIN: 0
VOMITING: 1
COUGH: 0

## 2024-01-23 ASSESSMENT — PAIN - FUNCTIONAL ASSESSMENT: PAIN_FUNCTIONAL_ASSESSMENT: 0-10

## 2024-01-23 NOTE — ED NOTES
Pt resting in bed at this time, laying in a supine position with head of bed elevated . Call light remains in reach instructed pt how to use, and encouraged pt to call if needed assistance, no distress noted. RR even and unlabored, skin warm and dry. No needs at this time. Will continue to monitor closely.  Pt back to room 16 in ED at this time.

## 2024-01-23 NOTE — ED NOTES
Pt to endo at this time via endo staff. VSS, afebrile. Pt is alert and orient to person, place, time and situation.

## 2024-01-23 NOTE — ED NOTES
To Room 5103  Cardiac monitor on during transfer  Pt's pain level  denies  VSS, Afebrile   IV site is clean, dry and intact, Normal saline locked   Family updated on transfer per pt

## 2024-01-23 NOTE — CONSULTS
Consult note complete  
122/86 (!) 148/86 132/80   Pulse: 98 100 (!) 102 (!) 104   Resp: 14 18 16 15   Temp:       TempSrc:       SpO2: 98% 95% 97% 97%   Weight:       Height:           No intake/output data recorded.      Physical Exam:  General appearance: alert, cooperative  Head: Normocephalic, without obvious abnormality  Lungs: clear to auscultation bilaterally, Normal Effort  Heart: regular rate and rhythm, normal S1 and S2, no murmurs or rubs  Abdomen: soft, non-distended, non-tender. Bowel sounds normal.  Extremities: atraumatic, no cyanosis or edema  Skin: warm and dry, no jaundice  Neuro: Grossly intact, A&OX3      LABS AND IMAGING   Laboratory   Recent Labs     01/23/24  0816   WBC 3.4*   HGB 9.6*   HCT 28.3*   MCV 96.5   PLT 69*     Recent Labs     01/23/24  0816   *   K 3.5      CO2 24   PHOS 3.8   BUN 4*   CREATININE 0.6*     Recent Labs     01/23/24  0816   AST 92*   ALT 26   BILITOT 3.2*   ALKPHOS 111     Recent Labs     01/23/24  0816   LIPASE 58.0     Recent Labs     01/23/24  0816   PROTIME 20.9*   INR 1.81*       Imaging  No orders to display         ASSESSMENT AND RECOMMENDATIONS   38 y.o. male with a  PMH of HCV/ETOH cirrhosis decompensated by nonbleeding esophageal varices, prior alcohol induced pancreatitis and alcohol hepatitis who presented on 1/23/2024 with hematemesis and melena.     IMPRESSION:    Intermittent hematemesis and melena.  Possibly related to Jeanette-Gonzales tear versus variceal bleed versus portal hypertensive gastropathy.  EGD 12/26 showed 2 columns of medium grade 2 esophageal varices without bleeding stigmata status post band ligation x 3 and moderate portal hypertensive gastropathy.  No ulcers.  Patient denies NSAID use.  Hemoglobin and BUN stable.  He has been started on octreotide and Rocephin.    Decompensated EtOH cirrhosis  -Etiology: HCV (VL- <10.  Prior 111K in 5/2022 and no prior treatment reported) and Etoh abuse.  KJ, AMA, A1 AT, ceruloplasmin, iron studies, hep B 
Denies fever, sweats, weight loss  Eyes: No visual changes or diplopia. No scleral icterus.  Ent: No headaches, hearing loss or vertigo.  No mouth sores or sore throat.  Cardiovascular: No chest pain, dyspnea on exertion, palpitations or loss of consciousness.  Respiratory: No cough or wheezing, no sputum production.  No hemoptysis.  Gastrointestinal: No abdominal pain, appetite loss, blood in stools.  No change in bowel habits.  Genitourinary: No dysuria, trouble voiding, or hematuria.  Musculoskeletal: No generalized weakness.  No joint complaints.  Integumentary: No rash or pruritus.  Neurological: No headache, diplopia.  No change in gait, balance, or coordination.  No paresthesias.  Endocrine: No temperature intolerance.  No excessive thirst, fluid intake, urination.  Hematologic/lymphatic: No abnormal bruising or ecchymosis, blood clots or swollen lymph nodes.  Allergic/immunologic: No nasal congestion or hives.        PHYSICAL EXAM:      Vitals:  Patient Vitals for the past 24 hrs:   BP Temp Temp src Pulse Resp SpO2 Height Weight   01/23/24 1504 136/88 97.7 °F (36.5 °C) Oral -- 20 96 % -- --   01/23/24 1420 133/74 -- -- (!) 109 20 94 % -- --   01/23/24 1415 130/79 -- -- (!) 111 15 97 % -- --   01/23/24 1414 126/78 98.5 °F (36.9 °C) Temporal (!) 116 11 94 % -- --   01/23/24 1215 107/71 -- -- 96 15 96 % -- --   01/23/24 1145 128/85 -- -- 99 28 97 % -- --   01/23/24 1130 (!) 137/92 -- -- (!) 106 14 96 % -- --   01/23/24 1030 132/82 -- -- (!) 104 16 97 % -- --   01/23/24 1015 132/80 -- -- (!) 104 15 97 % -- --   01/23/24 0945 (!) 148/86 -- -- (!) 102 16 97 % -- --   01/23/24 0915 122/86 -- -- 100 18 95 % -- --   01/23/24 0900 132/89 -- -- 98 14 98 % -- --   01/23/24 0845 126/69 -- -- 95 11 97 % -- --   01/23/24 0650 129/87 98.6 °F (37 °C) Oral (!) 130 22 93 % 1.753 m (5' 9\") 92.7 kg (204 lb 5.9 oz)           CONSTITUTIONAL: awake, alert, cooperative, no apparent distress Hemodynamically stable.  Nontoxic

## 2024-01-23 NOTE — ANESTHESIA PRE PROCEDURE
[facility] Department of Anesthesiology  Pre-Anesthesia Evaluation/Consultation       Name:  Rashad Looney  : 1985  Age:  38 y.o.                                           MRN:  [id number]  Date: 2024           Surgeon: Surgeon(s):  Lambert Spears MD    Procedure: Procedure(s):  ESOPHAGOGASTRODUODENOSCOPY     Allergies   Allergen Reactions   • Codeine      Patient Active Problem List   Diagnosis   • Pancreatitis, unspecified pancreatitis type   • Tobacco dependence   • Alcohol abuse   • Cholelithiasis   • Acute cholecystitis   • Upper GI bleed   • GI bleed     Past Medical History:   Diagnosis Date   • Alcohol use disorder    • Chronic liver disease    • Class 1 obesity    • Esophageal varices (HCC)    • Tobacco use disorder      Past Surgical History:   Procedure Laterality Date   • UPPER GASTROINTESTINAL ENDOSCOPY N/A 2023    EGD ESOPHAGOGASTRODUODENOSCOPY ENDOSCOPIC VARICEAL TREATMENT W/ BANDING performed by Jitendra Farmer Jr., DO at [facility] ENDOSCOPY     Social History     Tobacco Use   • Smoking status: Every Day     Current packs/day: 1.00     Types: Cigarettes   • Smokeless tobacco: Never   Vaping Use   • Vaping Use: Never used   Substance Use Topics   • Alcohol use: Yes   • Drug use: Not Currently     Medications  No current facility-administered medications on file prior to encounter.     Current Outpatient Medications on File Prior to Encounter   Medication Sig Dispense Refill   • gabapentin (NEURONTIN) 300 MG capsule Take 1 capsule by mouth in the morning, at noon, and at bedtime for 30 days. 90 capsule 0   • sildenafil (VIAGRA) 100 MG tablet 1 tab po daily prn 10 tablet 3   • pantoprazole (PROTONIX) 40 MG tablet Take 1 tablet by mouth 2 times daily (before meals) 60 tablet 3   • carvedilol (COREG) 3.125 MG tablet Take 1 tablet by mouth 2 times daily 60 tablet 5   • fluticasone (FLONASE) 50 MCG/ACT nasal spray 1 spray by Each Nostril route daily     • cyclobenzaprine

## 2024-01-23 NOTE — ANESTHESIA POSTPROCEDURE EVALUATION
Department of Anesthesiology  Postprocedure Note    Patient: Rashad Looney  MRN: [id number]  YOB: 1985  Date of evaluation: 1/23/2024    Procedure Summary       Date: 01/23/24 Room / Location: [place] [address] / [facility]    Anesthesia Start: 1338 Anesthesia Stop: 1407    Procedure: ESOPHAGOGASTRODUODENOSCOPY Diagnosis:       Melena      (Melena [K92.1])    Surgeons: Lambert Spears MD Responsible Provider: Josemanuel Alva MD    Anesthesia Type: general ASA Status: 3            Anesthesia Type: No value filed.    Wilman Phase I: Wilman Score: 10    Wilman Phase II:      Anesthesia Post Evaluation    Patient location during evaluation: PACU  Patient participation: complete - patient participated  Level of consciousness: awake and alert  Pain score: 0  Airway patency: patent  Nausea & Vomiting: no nausea and no vomiting  Cardiovascular status: blood pressure returned to baseline  Respiratory status: acceptable  Hydration status: euvolemic  Pain management: adequate    No notable events documented.

## 2024-01-23 NOTE — OP NOTE
Endoscopy Note    Patient: Rashad Looney  : 1985  Acct#:     Procedure: Esophagogastroduodenoscopy                         Date:  2024     Surgeon:   Lamebrt Spears MD    Referring Physician:  Lolis Mcmanus MD    Indications: This is a 38 y.o. year old male who presents today with Hematemesis/Melena/Hx esophageal varices.     Postoperative Diagnosis:  1.The distal esophagus had trace grade 1 varices with several banding ulcers in the distal esophagus. No evidence of active variceal bleeding.    Anesthesia:  The patient was administered IV propofol per anesthesiology team.  Please see their operative records for full details.      Consent:  The patient or their legal guardian has signed an informed consent, and is aware of the potential risks, benefits, alternatives, and potential complications of this procedure.  These include, but are not limited to hemorrhage, bleeding, post procedural pain, perforation, phlebitis, aspiration, hypotension, hypoxia, cardiovascular events such as arryhthmia, and possibly death.     Description of Procedure: The patient was then taken to the endoscopy suite, placed in the left lateral decubitus position and the above IV sedation was administrered.    The Olympus video endoscope was placed through the patient's oropharynx without difficulty to the extent of the 2nd portion of the duodenum.  Both forward and retroflexed views of the stomach were obtained.      Findings:    Esophagus: The distal esophagus had trace grade 1 varices with several banding ulcers in the distal esophagus. No evidence of active variceal bleeding. The esophagus otherwise appeared normal without evidence of Hawley's esophagus or reflux esophagitis.     Stomach: The visualization of the stomach was limited due to a large amount of food present. No blood was present in the stomach. I was not able to evaluate for esophageal varices.    Duodenum: The first and 2nd portions of the

## 2024-01-23 NOTE — ED PROVIDER NOTES
[facility] EMERGENCY DEPARTMENT    Name: Rashad Looney : 1985 MRN: [id number] Date of Service: 2024    Initial VS: BP: 129/87, Temp: 98.6 °F (37 °C), Pulse: (!) 130, Respirations: 22, SpO2: 93 %     CC: dark stools    HPI: this patient is a 38 y.o. male presenting to the ED from home. Mr. Looney tells me that for the past several days he has been feeling ill with nearly constant and intense nausea. During this same time he has been vomiting profusely and repeatedly. With time he has noticed himself getting increasingly fatigued and generally weak. Within the last 12 hours his condition has worsened as his stools have become very dark black in appearance. He was concerned that this dark discoloration of his stool could be related to bleeding so he came her this morning to be evaluated. On arrival here he adds that he is feeling anxious and tremulous; he wonders if he could be starting to withdraw from alcohol. He has not appreciated other changes from his usual state of health and he denies other current complaints.  _____________________________________________________________________    Past Medical History:   Diagnosis Date    Alcohol use disorder     Chronic liver disease     Class 1 obesity     Esophageal varices (HCC)     Tobacco use disorder      Past Surgical History:   Procedure Laterality Date    UPPER GASTROINTESTINAL ENDOSCOPY N/A 2023    EGD ESOPHAGOGASTRODUODENOSCOPY ENDOSCOPIC VARICEAL TREATMENT W/ BANDING performed by Jitendra Farmer Jr., DO at [facility] ENDOSCOPY     Social History     Tobacco Use    Smoking status: Every Day     Current packs/day: 1.00     Types: Cigarettes    Smokeless tobacco: Never   Vaping Use    Vaping Use: Never used   Substance Use Topics    Alcohol use: Yes    Drug use: Not Currently   _____________________________________________________________________    Review of Systems   Constitutional:  Positive for fatigue. Negative for chills and

## 2024-01-24 VITALS
HEART RATE: 94 BPM | OXYGEN SATURATION: 94 % | WEIGHT: 208.56 LBS | DIASTOLIC BLOOD PRESSURE: 70 MMHG | HEIGHT: 69 IN | BODY MASS INDEX: 30.89 KG/M2 | TEMPERATURE: 98.1 F | SYSTOLIC BLOOD PRESSURE: 119 MMHG | RESPIRATION RATE: 20 BRPM

## 2024-01-24 LAB
HCT VFR BLD AUTO: 24 % (ref 40.5–52.5)
HCT VFR BLD AUTO: 24.2 % (ref 40.5–52.5)
HCT VFR BLD AUTO: 24.5 % (ref 40.5–52.5)
HCT VFR BLD AUTO: 25.8 % (ref 40.5–52.5)
HGB BLD-MCNC: 8.3 G/DL (ref 13.5–17.5)
HGB BLD-MCNC: 8.3 G/DL (ref 13.5–17.5)
HGB BLD-MCNC: 8.5 G/DL (ref 13.5–17.5)
HGB BLD-MCNC: 8.7 G/DL (ref 13.5–17.5)

## 2024-01-24 PROCEDURE — 99232 SBSQ HOSP IP/OBS MODERATE 35: CPT | Performed by: INTERNAL MEDICINE

## 2024-01-24 PROCEDURE — 6360000002 HC RX W HCPCS: Performed by: EMERGENCY MEDICINE

## 2024-01-24 PROCEDURE — 6360000002 HC RX W HCPCS: Performed by: INTERNAL MEDICINE

## 2024-01-24 PROCEDURE — 2580000003 HC RX 258: Performed by: EMERGENCY MEDICINE

## 2024-01-24 PROCEDURE — 36415 COLL VENOUS BLD VENIPUNCTURE: CPT

## 2024-01-24 PROCEDURE — 2580000003 HC RX 258: Performed by: INTERNAL MEDICINE

## 2024-01-24 PROCEDURE — 6370000000 HC RX 637 (ALT 250 FOR IP): Performed by: INTERNAL MEDICINE

## 2024-01-24 PROCEDURE — 85014 HEMATOCRIT: CPT

## 2024-01-24 PROCEDURE — 85018 HEMOGLOBIN: CPT

## 2024-01-24 RX ORDER — BUPRENORPHINE HYDROCHLORIDE AND NALOXONE HYDROCHLORIDE DIHYDRATE 8; 2 MG/1; MG/1
1 TABLET SUBLINGUAL 2 TIMES DAILY
Status: DISCONTINUED | OUTPATIENT
Start: 2024-01-24 | End: 2024-01-24 | Stop reason: HOSPADM

## 2024-01-24 RX ADMIN — BUPRENORPHINE HYDROCHLORIDE AND NALOXONE HYDROCHLORIDE DIHYDRATE 1 TABLET: 8; 2 TABLET SUBLINGUAL at 10:23

## 2024-01-24 RX ADMIN — PANTOPRAZOLE SODIUM 40 MG: 40 TABLET, DELAYED RELEASE ORAL at 15:50

## 2024-01-24 RX ADMIN — THERA TABS 1 TABLET: TAB at 10:23

## 2024-01-24 RX ADMIN — LORAZEPAM 1 MG: 1 TABLET ORAL at 04:55

## 2024-01-24 RX ADMIN — Medication 100 MG: at 10:22

## 2024-01-24 RX ADMIN — GABAPENTIN 300 MG: 300 CAPSULE ORAL at 10:23

## 2024-01-24 RX ADMIN — SODIUM CHLORIDE: 9 INJECTION, SOLUTION INTRAVENOUS at 05:42

## 2024-01-24 RX ADMIN — OCTREOTIDE ACETATE 50 MCG/HR: 500 INJECTION, SOLUTION INTRAVENOUS; SUBCUTANEOUS at 08:18

## 2024-01-24 RX ADMIN — CARVEDILOL 3.12 MG: 3.12 TABLET, FILM COATED ORAL at 10:23

## 2024-01-24 RX ADMIN — FOLIC ACID 1 MG: 1 TABLET ORAL at 10:23

## 2024-01-24 RX ADMIN — Medication 10 ML: at 10:25

## 2024-01-24 RX ADMIN — GABAPENTIN 300 MG: 300 CAPSULE ORAL at 15:50

## 2024-01-24 RX ADMIN — PANTOPRAZOLE SODIUM 40 MG: 40 TABLET, DELAYED RELEASE ORAL at 05:10

## 2024-01-24 RX ADMIN — CARVEDILOL 3.12 MG: 3.12 TABLET, FILM COATED ORAL at 15:50

## 2024-01-24 NOTE — PLAN OF CARE
Problem: Discharge Planning  Goal: Discharge to home or other facility with appropriate resources  1/24/2024 1142 by Jing Starks, RN  Outcome: Progressing     Problem: Pain  Goal: Verbalizes/displays adequate comfort level or baseline comfort level  1/24/2024 1142 by Jing Starks, RN  Outcome: Progressing     Problem: Safety - Adult  Goal: Free from fall injury  1/24/2024 1142 by Jing Starks, RN  Outcome: Progressing

## 2024-01-24 NOTE — PROGRESS NOTES
INPATIENT PROGRESS NOTE        IDENTIFYING DATA/REASON FOR CONSULTATION   PATIENT:  Rashad Looney  MRN:  [id number]  ADMIT DATE: 2024  TIME OF EVALUATION: 2024 9:02 AM  HOSPITAL STAY:   LOS: 1 day   CONSULTING PHYSICIAN: Lolis Mcmanus MD   REASON FOR CONSULTATION: hematemesis    Subjective:    Patient seen in follow up   He's had no further vomiting overnight. No bms overnight.  He denies abdominal pain    MEDICATIONS   SCHEDULED:  LORazepam, 2 mg, Once  carvedilol, 3.125 mg, BID WC  cyclobenzaprine, 5 mg, Nightly  folic acid, 1 mg, Daily  gabapentin, 300 mg, TID  pantoprazole, 40 mg, BID AC  prazosin, 2 mg, Nightly  sodium chloride flush, 5-40 mL, 2 times per day  thiamine, 100 mg, Daily  multivitamin, 1 tablet, Daily  nicotine, 1 patch, Daily      FLUIDS/DRIPS:     octreotide (SANDOSTATIN) 500 mcg in sodium chloride 0.9 % 100 mL infusion 50 mcg/hr (24 0818)    sodium chloride 100 mL/hr at 24 0542    sodium chloride       PRNs: sodium chloride flush, 5-40 mL, PRN  sodium chloride, , PRN  LORazepam, 1 mg, Q1H PRN   Or  LORazepam, 1 mg, Q1H PRN   Or  LORazepam, 2 mg, Q1H PRN   Or  LORazepam, 2 mg, Q1H PRN   Or  LORazepam, 3 mg, Q1H PRN   Or  LORazepam, 3 mg, Q1H PRN   Or  LORazepam, 4 mg, Q1H PRN   Or  LORazepam, 4 mg, Q1H PRN      ALLERGIES:    Allergies   Allergen Reactions    Codeine          PHYSICAL EXAM   VITALS:  /79   Pulse (!) 118   Temp 98 °F (36.7 °C) (Tympanic)   Resp 18   Ht 1.753 m (5' 9\")   Wt 94.6 kg (208 lb 8.9 oz)   SpO2 98%   BMI 30.80 kg/m²   TEMPERATURE:  Current - Temp: 98 °F (36.7 °C); Max - Temp  Av °F (36.7 °C)  Min: 97.7 °F (36.5 °C)  Max: 98.5 °F (36.9 °C)    Physical Exam:  General appearance: alert, cooperative, no distress, appears stated age  Eyes: Anicteric  Head: Normocephalic, without obvious abnormality  Lungs: clear to auscultation bilaterally, Normal Effort  Heart: regular rate and rhythm, normal S1 and S2, no murmurs or

## 2024-01-24 NOTE — PROGRESS NOTES
Hospitalist Progress Note  1/24/2024 2:07 PM  Subjective:   Admit Date: 1/23/2024  PCP: Lolis Mcmanus MD  Interval History: pt is feeling better  On liquid diet  Chart reviewed.  Diet: ADULT DIET; Full Liquid  Medications:   Scheduled Meds:   buprenorphine-naloxone  1 tablet SubLINGual BID    LORazepam  2 mg IntraVENous Once    carvedilol  3.125 mg Oral BID WC    cyclobenzaprine  5 mg Oral Nightly    folic acid  1 mg Oral Daily    gabapentin  300 mg Oral TID    pantoprazole  40 mg Oral BID AC    prazosin  2 mg Oral Nightly    sodium chloride flush  5-40 mL IntraVENous 2 times per day    thiamine  100 mg Oral Daily    multivitamin  1 tablet Oral Daily    nicotine  1 patch TransDERmal Daily     Continuous Infusions:   sodium chloride       CBC:   Recent Labs     01/23/24  0816 01/23/24  1934 01/24/24  0056 01/24/24  0642 01/24/24  1320   WBC 3.4*  --   --   --   --    HGB 9.6*   < > 8.7* 8.5* 8.3*   PLT 69*  --   --   --   --     < > = values in this interval not displayed.     BMP:    Recent Labs     01/23/24  0816   *   K 3.5      CO2 24   BUN 4*   CREATININE 0.6*   GLUCOSE 112*     Hepatic:   Recent Labs     01/23/24  0816   AST 92*   ALT 26   BILITOT 3.2*   ALKPHOS 111     Troponin: No results for input(s): \"TROPONINI\" in the last 72 hours.  BNP: No results for input(s): \"BNP\" in the last 72 hours.  Lipids: No results for input(s): \"CHOL\", \"HDL\" in the last 72 hours.    Invalid input(s): \"LDLCALCU\"  INR:   Recent Labs     01/23/24  0816   INR 1.81*       Objective:   Vitals: /68   Pulse 99   Temp 98.1 °F (36.7 °C) (Oral)   Resp 18   Ht 1.753 m (5' 9\")   Wt 94.6 kg (208 lb 8.9 oz)   SpO2 97%   BMI 30.80 kg/m²   General appearance: alert,awake,oriented x 3 and cooperative with exam  HEENT: Head: Normal, normocephalic, atraumatic, anicteric, pupils are reactive to light. Dry mucous membrane.  Neck: no adenopathy, no carotid bruit, supple, symmetrical, trachea midline and thyroid not

## 2024-01-24 NOTE — H&P
[facility] Medicine History & Physical    Patient:  Rashad Looney  YOB: 1985  Date of Service: 1/23/24  MRN: [id number]    PCP: Lolis Mcmanus MD    Date of Admission: 1/23/2024      Chief Complaint:    Chief Complaint   Patient presents with    Melena     Pt has been having black stools and hasn't been feels well. Eye is blood shot. Pt has been throwing up         History Of Present Illness:    The patient is a 38 y.o. male who presents to [facility] with c/o as above  Also started with L eye redness with some blurred vision and is better now  Still is drinking  No other complaints    Past Medical History:        Diagnosis Date    Alcohol use disorder     Chronic liver disease     Class 1 obesity     Esophageal varices (HCC)     Tobacco use disorder        Past Surgical History:        Procedure Laterality Date    UPPER GASTROINTESTINAL ENDOSCOPY N/A 12/26/2023    EGD ESOPHAGOGASTRODUODENOSCOPY ENDOSCOPIC VARICEAL TREATMENT W/ BANDING performed by Jitendra Farmer Jr., DO at [facility] ENDOSCOPY       Family History:  History reviewed. No pertinent family history.    Medications Prior to Admission:    Prior to Admission medications    Medication Sig Start Date End Date Taking? Authorizing Provider   gabapentin (NEURONTIN) 300 MG capsule Take 1 capsule by mouth in the morning, at noon, and at bedtime for 30 days. 1/11/24 2/10/24  Lolis Mcmanus MD   sildenafil (VIAGRA) 100 MG tablet 1 tab po daily prn 1/11/24   Lolis Mcmanus MD   pantoprazole (PROTONIX) 40 MG tablet Take 1 tablet by mouth 2 times daily (before meals) 1/11/24   Lolis Mcmanus MD   carvedilol (COREG) 3.125 MG tablet Take 1 tablet by mouth 2 times daily 1/11/24   Lolis Mcmanus MD   fluticasone (FLONASE) 50 MCG/ACT nasal spray 1 spray by Each Nostril route daily  Patient

## 2024-01-24 NOTE — PROGRESS NOTES
Physician Progress Note      PATIENT:               SANDRA MCGREGOR  CSN #:                  [id number]  :                       1985  ADMIT DATE:       2024 6:46 AM  DISCH DATE:  RESPONDING  PROVIDER #:        Lolis Mcmanus MD          QUERY TEXT:    Patient admitted with Cirrhosis with Esophageal ulcers with bleeding, noted to   have low RBCs, WBC count or neutrophils and low platelet count. If possible,   please document in progress notes and discharge summary if you are evaluating   and/or treating any of the following:      The medical record reflects the following:  Risk Factors: Alcoholic Cirrhosis  Clinical Indicators: on admit WBC 3.4/H&H 9.6/28.3/platelets 69, per ED   Provider \"Pancytopenia\" , per GI consult \"Decompensated EtOH cirrhosis\"    Hematology consult \"1. Thrombocytopenia/anemia -s/p EGD 2024 concern for   bleeding from banding ulcers.-Due to cirrhosis from alcohol abuse.-We will   send iron studies, nutritional studies, and coags.\"  Treatment: Serial lab monitoring, MVI, IV Thiamine x 1, then oral Thiamine   daily, GI and Hematology consults  Options provided:  -- Pancytopenia due to Alcoholic Cirrhosis  -- Other - I will add my own diagnosis  -- Disagree - Not applicable / Not valid  -- Disagree - Clinically unable to determine / Unknown  -- Refer to Clinical Documentation Reviewer    PROVIDER RESPONSE TEXT:    Patient has pancytopenia due to Alcoholic Cirrhosis    Query created by: Eulalia Borjas on 2024 12:36 PM      Electronically signed by:  Lolis Mcmanus MD 2024 2:07 PM

## 2024-01-25 RX ORDER — GLYCOPYRROLATE 0.2 MG/ML
INJECTION INTRAMUSCULAR; INTRAVENOUS PRN
Status: DISCONTINUED | OUTPATIENT
Start: 2024-01-23 | End: 2024-01-25 | Stop reason: SDUPTHER

## 2024-01-25 NOTE — ADDENDUM NOTE
Addendum  created 01/25/24 0837 by Nixon Taylor APRN - CRNA    Intraprocedure Meds edited, Orders acknowledged in Narrator

## 2024-01-25 NOTE — PROGRESS NOTES
[x] Discussed risk factors for leaving hospital against medical advice, up to and including risk of death  [x] Patient is NOT on an involuntary hold  [x] Patient is alert and oriented  [x] Attempt made to identify patient's reason for wanting to leave AMA  at 0800  [x] Attempt made to reduce any barriers to patient remaining inpatient   [x] Patient verbalizes understanding that he/she is leaving prior to completion of treatment   [x] Patient's provider Dr. Mcmanus has been notified of patient's desire to leave AMA  [] Prescriptions provided, and reviewed with patient  [] Patient declined to wait for physician to write prescriptions  [x] Provider Dr. Mcmanus is not providing prescriptions  [x] Attempt made to arrange follow up care, states he will come back after   [x] Attempt made to identify patient's arrangements for safe transportation from facility  [x] AMA form signed  [] Patient refused to sign AMA form  [x] IV accesses discontinued as appropriate  [x] Patient is invited to return to the emergency department if further treatment if needed   [x] Charge nurse has been informed            Electronically signed by Jing Starks RN on 2024 at 7:37 PM

## 2024-04-16 VITALS
SYSTOLIC BLOOD PRESSURE: 115 MMHG | DIASTOLIC BLOOD PRESSURE: 72 MMHG | OXYGEN SATURATION: 95 % | BODY MASS INDEX: 27.76 KG/M2 | HEIGHT: 69 IN | WEIGHT: 187.39 LBS | HEART RATE: 116 BPM | TEMPERATURE: 98.1 F | RESPIRATION RATE: 18 BRPM

## 2024-04-16 ASSESSMENT — PAIN SCALES - GENERAL: PAINLEVEL_OUTOF10: 7

## 2024-04-16 ASSESSMENT — LIFESTYLE VARIABLES
HOW MANY STANDARD DRINKS CONTAINING ALCOHOL DO YOU HAVE ON A TYPICAL DAY: 3 OR 4
HOW OFTEN DO YOU HAVE A DRINK CONTAINING ALCOHOL: 4 OR MORE TIMES A WEEK

## 2024-04-16 ASSESSMENT — PAIN - FUNCTIONAL ASSESSMENT: PAIN_FUNCTIONAL_ASSESSMENT: 0-10

## 2024-04-17 ENCOUNTER — APPOINTMENT (OUTPATIENT)
Dept: CT IMAGING | Age: 39
End: 2024-04-17
Payer: COMMERCIAL

## 2024-04-17 ENCOUNTER — HOSPITAL ENCOUNTER (EMERGENCY)
Age: 39
Discharge: LEFT BEFORE DISCHARGE | End: 2024-04-17

## 2024-04-17 ENCOUNTER — APPOINTMENT (OUTPATIENT)
Dept: GENERAL RADIOLOGY | Age: 39
End: 2024-04-17
Payer: COMMERCIAL

## 2024-04-17 ENCOUNTER — HOSPITAL ENCOUNTER (INPATIENT)
Age: 39
LOS: 2 days | Discharge: HOME | End: 2024-04-19
Attending: INTERNAL MEDICINE | Admitting: INTERNAL MEDICINE
Payer: COMMERCIAL

## 2024-04-17 DIAGNOSIS — L03.115 CELLULITIS OF RIGHT LOWER EXTREMITY: Primary | ICD-10-CM

## 2024-04-17 DIAGNOSIS — F10.930 ALCOHOL WITHDRAWAL SYNDROME WITHOUT COMPLICATION (HCC): ICD-10-CM

## 2024-04-17 DIAGNOSIS — K70.31 ALCOHOLIC CIRRHOSIS OF LIVER WITH ASCITES (HCC): ICD-10-CM

## 2024-04-17 DIAGNOSIS — F10.920 ACUTE ALCOHOLIC INTOXICATION WITHOUT COMPLICATION (HCC): ICD-10-CM

## 2024-04-17 DIAGNOSIS — R10.10 PAIN OF UPPER ABDOMEN: ICD-10-CM

## 2024-04-17 DIAGNOSIS — E80.6 HYPERBILIRUBINEMIA: ICD-10-CM

## 2024-04-17 DIAGNOSIS — R74.01 ELEVATED TRANSAMINASE LEVEL: ICD-10-CM

## 2024-04-17 PROBLEM — L03.90 CELLULITIS: Status: ACTIVE | Noted: 2024-04-17

## 2024-04-17 LAB
ALBUMIN SERPL-MCNC: 3.6 G/DL (ref 3.4–5)
ALBUMIN/GLOB SERPL: 1.2 {RATIO} (ref 1.1–2.2)
ALP SERPL-CCNC: 147 U/L (ref 40–129)
ALT SERPL-CCNC: 46 U/L (ref 10–40)
ANION GAP SERPL CALCULATED.3IONS-SCNC: 11 MMOL/L (ref 3–16)
AST SERPL-CCNC: 112 U/L (ref 15–37)
BACTERIA URNS QL MICRO: NORMAL /HPF
BASOPHILS # BLD: 0 K/UL (ref 0–0.2)
BASOPHILS NFR BLD: 0.5 %
BILIRUB SERPL-MCNC: 4.2 MG/DL (ref 0–1)
BILIRUB UR QL STRIP.AUTO: ABNORMAL
BUN SERPL-MCNC: 8 MG/DL (ref 7–20)
CALCIUM SERPL-MCNC: 8.1 MG/DL (ref 8.3–10.6)
CHLORIDE SERPL-SCNC: 100 MMOL/L (ref 99–110)
CLARITY UR: ABNORMAL
CO2 SERPL-SCNC: 27 MMOL/L (ref 21–32)
COLOR UR: ABNORMAL
CREAT SERPL-MCNC: <0.5 MG/DL (ref 0.9–1.3)
DEPRECATED RDW RBC AUTO: 17.3 % (ref 12.4–15.4)
EOSINOPHIL # BLD: 0.1 K/UL (ref 0–0.6)
EOSINOPHIL NFR BLD: 1.9 %
EPI CELLS #/AREA URNS AUTO: 0 /HPF (ref 0–5)
ETHANOLAMINE SERPL-MCNC: 282 MG/DL (ref 0–0.08)
GFR SERPLBLD CREATININE-BSD FMLA CKD-EPI: >90 ML/MIN/{1.73_M2}
GLUCOSE SERPL-MCNC: 114 MG/DL (ref 70–99)
GLUCOSE UR STRIP.AUTO-MCNC: ABNORMAL MG/DL
HCT VFR BLD AUTO: 24.5 % (ref 40.5–52.5)
HGB BLD-MCNC: 8.1 G/DL (ref 13.5–17.5)
HGB UR QL STRIP.AUTO: ABNORMAL
HYALINE CASTS #/AREA URNS AUTO: 0 /LPF (ref 0–8)
KETONES UR STRIP.AUTO-MCNC: ABNORMAL MG/DL
LEUKOCYTE ESTERASE UR QL STRIP.AUTO: ABNORMAL
LIPASE SERPL-CCNC: 61 U/L (ref 13–60)
LYMPHOCYTES # BLD: 1.1 K/UL (ref 1–5.1)
LYMPHOCYTES NFR BLD: 29.9 %
MAGNESIUM SERPL-MCNC: 2.1 MG/DL (ref 1.8–2.4)
MCH RBC QN AUTO: 31.1 PG (ref 26–34)
MCHC RBC AUTO-ENTMCNC: 33.1 G/DL (ref 31–36)
MCV RBC AUTO: 94.1 FL (ref 80–100)
MONOCYTES # BLD: 0.6 K/UL (ref 0–1.3)
MONOCYTES NFR BLD: 16.1 %
NEUTROPHILS # BLD: 2 K/UL (ref 1.7–7.7)
NEUTROPHILS NFR BLD: 51.6 %
NITRITE UR QL STRIP.AUTO: ABNORMAL
PH UR STRIP.AUTO: ABNORMAL [PH] (ref 5–8)
PLATELET # BLD AUTO: 56 K/UL (ref 135–450)
PMV BLD AUTO: 8.8 FL (ref 5–10.5)
POTASSIUM SERPL-SCNC: 3.3 MMOL/L (ref 3.5–5.1)
PROT SERPL-MCNC: 6.5 G/DL (ref 6.4–8.2)
PROT UR STRIP.AUTO-MCNC: ABNORMAL MG/DL
RBC # BLD AUTO: 2.61 M/UL (ref 4.2–5.9)
RBC CLUMPS #/AREA URNS AUTO: 1 /HPF (ref 0–4)
SODIUM SERPL-SCNC: 138 MMOL/L (ref 136–145)
SP GR UR STRIP.AUTO: ABNORMAL (ref 1–1.03)
UA COMPLETE W REFLEX CULTURE PNL UR: ABNORMAL
UA DIPSTICK W REFLEX MICRO PNL UR: YES
URN SPEC COLLECT METH UR: ABNORMAL
UROBILINOGEN UR STRIP-ACNC: ABNORMAL E.U./DL
WBC # BLD AUTO: 3.8 K/UL (ref 4–11)
WBC #/AREA URNS AUTO: 1 /HPF (ref 0–5)

## 2024-04-17 PROCEDURE — 83735 ASSAY OF MAGNESIUM: CPT

## 2024-04-17 PROCEDURE — 6360000004 HC RX CONTRAST MEDICATION: Performed by: PHYSICIAN ASSISTANT

## 2024-04-17 PROCEDURE — 83690 ASSAY OF LIPASE: CPT

## 2024-04-17 PROCEDURE — 71045 X-RAY EXAM CHEST 1 VIEW: CPT

## 2024-04-17 PROCEDURE — 1200000000 HC SEMI PRIVATE

## 2024-04-17 PROCEDURE — 85025 COMPLETE CBC W/AUTO DIFF WBC: CPT

## 2024-04-17 PROCEDURE — 6370000000 HC RX 637 (ALT 250 FOR IP): Performed by: INTERNAL MEDICINE

## 2024-04-17 PROCEDURE — 2500000003 HC RX 250 WO HCPCS: Performed by: PHYSICIAN ASSISTANT

## 2024-04-17 PROCEDURE — 81001 URINALYSIS AUTO W/SCOPE: CPT

## 2024-04-17 PROCEDURE — 2580000003 HC RX 258: Performed by: INTERNAL MEDICINE

## 2024-04-17 PROCEDURE — 74177 CT ABD & PELVIS W/CONTRAST: CPT

## 2024-04-17 PROCEDURE — 80053 COMPREHEN METABOLIC PANEL: CPT

## 2024-04-17 PROCEDURE — 73701 CT LOWER EXTREMITY W/DYE: CPT

## 2024-04-17 PROCEDURE — 2580000003 HC RX 258: Performed by: PHYSICIAN ASSISTANT

## 2024-04-17 PROCEDURE — 99285 EMERGENCY DEPT VISIT HI MDM: CPT

## 2024-04-17 PROCEDURE — 6360000002 HC RX W HCPCS: Performed by: PHYSICIAN ASSISTANT

## 2024-04-17 PROCEDURE — 6360000002 HC RX W HCPCS: Performed by: INTERNAL MEDICINE

## 2024-04-17 PROCEDURE — 82077 ASSAY SPEC XCP UR&BREATH IA: CPT

## 2024-04-17 PROCEDURE — 96374 THER/PROPH/DIAG INJ IV PUSH: CPT

## 2024-04-17 RX ORDER — LORAZEPAM 2 MG/ML
3 INJECTION INTRAMUSCULAR
Status: DISCONTINUED | OUTPATIENT
Start: 2024-04-17 | End: 2024-04-19 | Stop reason: HOSPADM

## 2024-04-17 RX ORDER — SODIUM CHLORIDE 0.9 % (FLUSH) 0.9 %
5-40 SYRINGE (ML) INJECTION PRN
Status: DISCONTINUED | OUTPATIENT
Start: 2024-04-17 | End: 2024-04-19 | Stop reason: HOSPADM

## 2024-04-17 RX ORDER — GABAPENTIN 300 MG/1
300 CAPSULE ORAL 3 TIMES DAILY
Status: DISCONTINUED | OUTPATIENT
Start: 2024-04-17 | End: 2024-04-19 | Stop reason: HOSPADM

## 2024-04-17 RX ORDER — LORAZEPAM 2 MG/ML
1 INJECTION INTRAMUSCULAR
Status: DISCONTINUED | OUTPATIENT
Start: 2024-04-17 | End: 2024-04-19 | Stop reason: HOSPADM

## 2024-04-17 RX ORDER — LORAZEPAM 1 MG/1
3 TABLET ORAL
Status: DISCONTINUED | OUTPATIENT
Start: 2024-04-17 | End: 2024-04-19 | Stop reason: HOSPADM

## 2024-04-17 RX ORDER — LORAZEPAM 1 MG/1
4 TABLET ORAL
Status: DISCONTINUED | OUTPATIENT
Start: 2024-04-17 | End: 2024-04-19 | Stop reason: HOSPADM

## 2024-04-17 RX ORDER — CARVEDILOL 3.12 MG/1
3.12 TABLET ORAL 2 TIMES DAILY
Status: DISCONTINUED | OUTPATIENT
Start: 2024-04-17 | End: 2024-04-19 | Stop reason: HOSPADM

## 2024-04-17 RX ORDER — SODIUM CHLORIDE 9 MG/ML
INJECTION, SOLUTION INTRAVENOUS CONTINUOUS
Status: DISCONTINUED | OUTPATIENT
Start: 2024-04-17 | End: 2024-04-19 | Stop reason: HOSPADM

## 2024-04-17 RX ORDER — GAUZE BANDAGE 2" X 2"
100 BANDAGE TOPICAL DAILY
Status: DISCONTINUED | OUTPATIENT
Start: 2024-04-17 | End: 2024-04-19 | Stop reason: HOSPADM

## 2024-04-17 RX ORDER — LORAZEPAM 1 MG/1
1 TABLET ORAL
Status: DISCONTINUED | OUTPATIENT
Start: 2024-04-17 | End: 2024-04-19 | Stop reason: HOSPADM

## 2024-04-17 RX ORDER — FOLIC ACID 1 MG/1
1 TABLET ORAL DAILY
Status: DISCONTINUED | OUTPATIENT
Start: 2024-04-18 | End: 2024-04-19 | Stop reason: HOSPADM

## 2024-04-17 RX ORDER — SODIUM CHLORIDE 9 MG/ML
INJECTION, SOLUTION INTRAVENOUS PRN
Status: DISCONTINUED | OUTPATIENT
Start: 2024-04-17 | End: 2024-04-19 | Stop reason: HOSPADM

## 2024-04-17 RX ORDER — LORAZEPAM 1 MG/1
2 TABLET ORAL
Status: DISCONTINUED | OUTPATIENT
Start: 2024-04-17 | End: 2024-04-19 | Stop reason: HOSPADM

## 2024-04-17 RX ORDER — PANTOPRAZOLE SODIUM 40 MG/1
40 TABLET, DELAYED RELEASE ORAL
Status: DISCONTINUED | OUTPATIENT
Start: 2024-04-18 | End: 2024-04-19 | Stop reason: HOSPADM

## 2024-04-17 RX ORDER — LORAZEPAM 2 MG/ML
4 INJECTION INTRAMUSCULAR
Status: DISCONTINUED | OUTPATIENT
Start: 2024-04-17 | End: 2024-04-19 | Stop reason: HOSPADM

## 2024-04-17 RX ORDER — LORAZEPAM 2 MG/ML
2 INJECTION INTRAMUSCULAR
Status: DISCONTINUED | OUTPATIENT
Start: 2024-04-17 | End: 2024-04-19 | Stop reason: HOSPADM

## 2024-04-17 RX ORDER — CYCLOBENZAPRINE HCL 10 MG
5 TABLET ORAL NIGHTLY
Status: DISCONTINUED | OUTPATIENT
Start: 2024-04-17 | End: 2024-04-19 | Stop reason: HOSPADM

## 2024-04-17 RX ORDER — BUPRENORPHINE HYDROCHLORIDE AND NALOXONE HYDROCHLORIDE DIHYDRATE 8; 2 MG/1; MG/1
1 TABLET SUBLINGUAL 2 TIMES DAILY
Status: DISCONTINUED | OUTPATIENT
Start: 2024-04-17 | End: 2024-04-18

## 2024-04-17 RX ORDER — OMEPRAZOLE 40 MG/1
40 CAPSULE, DELAYED RELEASE ORAL DAILY
COMMUNITY

## 2024-04-17 RX ORDER — SODIUM CHLORIDE 0.9 % (FLUSH) 0.9 %
5-40 SYRINGE (ML) INJECTION EVERY 12 HOURS SCHEDULED
Status: DISCONTINUED | OUTPATIENT
Start: 2024-04-17 | End: 2024-04-19 | Stop reason: HOSPADM

## 2024-04-17 RX ADMIN — FOLIC ACID: 5 INJECTION, SOLUTION INTRAMUSCULAR; INTRAVENOUS; SUBCUTANEOUS at 15:14

## 2024-04-17 RX ADMIN — CYCLOBENZAPRINE 5 MG: 10 TABLET, FILM COATED ORAL at 20:03

## 2024-04-17 RX ADMIN — PIPERACILLIN AND TAZOBACTAM 4500 MG: 4; .5 INJECTION, POWDER, LYOPHILIZED, FOR SOLUTION INTRAVENOUS at 20:08

## 2024-04-17 RX ADMIN — GABAPENTIN 300 MG: 300 CAPSULE ORAL at 20:03

## 2024-04-17 RX ADMIN — IOPAMIDOL 75 ML: 755 INJECTION, SOLUTION INTRAVENOUS at 14:20

## 2024-04-17 ASSESSMENT — LIFESTYLE VARIABLES
HOW OFTEN DO YOU HAVE A DRINK CONTAINING ALCOHOL: 4 OR MORE TIMES A WEEK
HOW MANY STANDARD DRINKS CONTAINING ALCOHOL DO YOU HAVE ON A TYPICAL DAY: 7 TO 9

## 2024-04-17 ASSESSMENT — PAIN SCALES - GENERAL
PAINLEVEL_OUTOF10: 7
PAINLEVEL_OUTOF10: 9
PAINLEVEL_OUTOF10: 7

## 2024-04-17 ASSESSMENT — PAIN DESCRIPTION - LOCATION
LOCATION: ABDOMEN
LOCATION: FLANK;ABDOMEN

## 2024-04-17 ASSESSMENT — ENCOUNTER SYMPTOMS
NAUSEA: 0
COUGH: 0
SHORTNESS OF BREATH: 0
VOMITING: 0
BACK PAIN: 0
EYE PAIN: 0
ABDOMINAL PAIN: 1
SORE THROAT: 0

## 2024-04-17 ASSESSMENT — PAIN - FUNCTIONAL ASSESSMENT: PAIN_FUNCTIONAL_ASSESSMENT: 0-10

## 2024-04-17 ASSESSMENT — PAIN DESCRIPTION - ORIENTATION: ORIENTATION: RIGHT;LEFT

## 2024-04-17 NOTE — ED PROVIDER NOTES
gas identified.   2. Muscle flap over the medial aspect of the proximal tibia without   complication identified.   3. Small nonspecific knee effusion.  No CT evidence of osteomyelitis.         CT TIBIA FIBULA LEFT W CONTRAST   Final Result   Mild edema in the anterior subcutaneous tissues but no visualized abscess.         CT ABDOMEN PELVIS W IV CONTRAST Additional Contrast? None   Final Result   1. Mild findings for cirrhosis and mild ascites.   2. Splenomegaly without change.   3. Cholelithiasis without change.  Gallbladder is largely distended.   4. No CT evidence for pancreatitis.  If the lab values are abnormal, then   this is stage A.         XR CHEST PORTABLE   Final Result   No acute cardiopulmonary findings           CT ABDOMEN PELVIS W IV CONTRAST Additional Contrast? None    Result Date: 4/17/2024  EXAMINATION: CT OF THE ABDOMEN AND PELVIS WITH CONTRAST 4/17/2024 2:15 pm TECHNIQUE: CT of the abdomen and pelvis was performed with the administration of intravenous contrast. Multiplanar reformatted images are provided for review. Automated exposure control, iterative reconstruction, and/or weight based adjustment of the mA/kV was utilized to reduce the radiation dose to as low as reasonably achievable. COMPARISON: 12/25/2023 HISTORY: ORDERING SYSTEM PROVIDED HISTORY: Epigastric abdominal pain.  History of pancreatitis.  Alcohol abuse TECHNOLOGIST PROVIDED HISTORY: Additional Contrast?->None Reason for exam:->Epigastric abdominal pain.  History of pancreatitis. Alcohol abuse Decision Support Exception - unselect if not a suspected or confirmed emergency medical condition->Emergency Medical Condition (MA) Reason for Exam: Epigastric abdominal pain. History of pancreatitis. Alcohol abuse FINDINGS: Lower chest: Heart size is normal. Lungs are clear. Liver: The liver is mildly hypodense and heterogeneous but is still within normal limits.  The contour is minimally nodular.  Normal enhancement within the  reasonably achievable. COMPARISON: None. HISTORY ORDERING SYSTEM PROVIDED HISTORY: Concern for possible abscess at skin graft TECHNOLOGIST PROVIDED HISTORY: Reason for exam:->Concern for possible abscess at skin graft Additional Contrast?->None Reason for Exam: Concern for possible abscess at skin graft FINDINGS: Bones: Patellar tracking is normal.  No lytic destructive lesions.  No periosteal reaction. Soft Tissue: Edema in the subcutaneous tissues.  This is predominantly anterior.  The edema becomes circumferential distally along the leg and at the ankle.  No formed fluid collection.  No gas in the soft tissues.  No fluid in the fascial planes. Joint: No significant degenerative changes. No osseous erosions.     Mild edema in the anterior subcutaneous tissues but no visualized abscess.     XR CHEST PORTABLE    Result Date: 4/17/2024  EXAMINATION: ONE XRAY VIEW OF THE CHEST 4/17/2024 1:48 pm COMPARISON: None. HISTORY: ORDERING SYSTEM PROVIDED HISTORY: SOB TECHNOLOGIST PROVIDED HISTORY: Reason for exam:->SOB Reason for Exam: sob FINDINGS: Normal cardiomediastinal silhouette.  No acute airspace infiltrate.  No pneumothorax or pleural effusion     No acute cardiopulmonary findings      No results found.    MEDICAL DECISION MAKING / ED COURSE:      PROCEDURES:   Procedures    Patient was given:  Medications   sodium chloride 0.9 % 1,000 mL with folic acid 1 mg, adult multi-vitamin with vitamin k 10 mL, thiamine 100 mg ( IntraVENous New Bag 4/17/24 1514)   iopamidol (ISOVUE-370) 76 % injection 75 mL (75 mLs IntraVENous Given 4/17/24 1420)       CONSULTS: (Who and What was discussed)  IP CONSULT TO PRIMARY CARE PROVIDER      Chronic Conditions affecting care:    has a past medical history of Alcohol use disorder, Chronic liver disease, Class 1 obesity, Esophageal varices (HCC), and Tobacco use disorder.     Records Reviewed (External and Source)   I personally reviewed patient medical record    CC/HPI Summary, DDx, ED

## 2024-04-17 NOTE — ED TRIAGE NOTES
Pt reports blood in urine, LUQ, RUQ abdominal pain, bilateral flank pain 9/10, cellulitis right leg x 3 days. Pt reports daily drinker 6 16 oz merlin's hard lemonade/day. Reports drinking 2 16 oz 1 hour ago. Pt denies hx of DT with withdrawal. Dr. Jacobson pt. Hx Hepatitis C

## 2024-04-17 NOTE — ED TRIAGE NOTES
Patient comes to ER with complaint of LUQ pain. Patient reports that he has been seen for this and has signed himself out each time. Patient admits to drinking a lot, he has 30 days of vacation and states he can now be admitted and wants help to stop drinking. Patient last drink he reports is a couple hours ago.

## 2024-04-17 NOTE — PROGRESS NOTES
Medication Reconciliation    List of medications patient is currently taking is complete.     Source of information: 1. Conversation with patient and wife at bedside                                      2. EPIC records         Notes regarding home medications:   1. Patient reports he doesn't remember to take medication everyday. He states he is only taking omeprazole and gabapentin. States he had \"one eighth of a Suboxone last week\" and is now out of suboxone. \"I don't want to be taking all this medication.\"      Lambert Dhillon, [facility]   4/17/2024  5:20 PM

## 2024-04-18 PROCEDURE — 6370000000 HC RX 637 (ALT 250 FOR IP): Performed by: INTERNAL MEDICINE

## 2024-04-18 PROCEDURE — 6360000002 HC RX W HCPCS: Performed by: INTERNAL MEDICINE

## 2024-04-18 PROCEDURE — 99222 1ST HOSP IP/OBS MODERATE 55: CPT | Performed by: INTERNAL MEDICINE

## 2024-04-18 PROCEDURE — 2580000003 HC RX 258: Performed by: INTERNAL MEDICINE

## 2024-04-18 PROCEDURE — 1200000000 HC SEMI PRIVATE

## 2024-04-18 RX ORDER — BUPRENORPHINE AND NALOXONE 8; 2 MG/1; MG/1
1 FILM, SOLUBLE BUCCAL; SUBLINGUAL 2 TIMES DAILY
Status: DISCONTINUED | OUTPATIENT
Start: 2024-04-18 | End: 2024-04-19 | Stop reason: HOSPADM

## 2024-04-18 RX ORDER — NICOTINE 21 MG/24HR
1 PATCH, TRANSDERMAL 24 HOURS TRANSDERMAL DAILY
Status: DISCONTINUED | OUTPATIENT
Start: 2024-04-18 | End: 2024-04-19 | Stop reason: HOSPADM

## 2024-04-18 RX ADMIN — FOLIC ACID 1 MG: 1 TABLET ORAL at 09:50

## 2024-04-18 RX ADMIN — PIPERACILLIN AND TAZOBACTAM 3375 MG: 3; .375 INJECTION, POWDER, LYOPHILIZED, FOR SOLUTION INTRAVENOUS at 09:52

## 2024-04-18 RX ADMIN — LORAZEPAM 1 MG: 1 TABLET ORAL at 22:01

## 2024-04-18 RX ADMIN — GABAPENTIN 300 MG: 300 CAPSULE ORAL at 09:51

## 2024-04-18 RX ADMIN — Medication 100 MG: at 09:50

## 2024-04-18 RX ADMIN — BUPRENORPHINE AND NALOXONE 1 FILM: 8; 2 FILM, SOLUBLE BUCCAL; SUBLINGUAL at 22:01

## 2024-04-18 RX ADMIN — CARVEDILOL 3.12 MG: 6.25 TABLET, FILM COATED ORAL at 09:50

## 2024-04-18 RX ADMIN — PIPERACILLIN AND TAZOBACTAM 3375 MG: 3; .375 INJECTION, POWDER, LYOPHILIZED, FOR SOLUTION INTRAVENOUS at 00:23

## 2024-04-18 RX ADMIN — BUPRENORPHINE HYDROCHLORIDE AND NALOXONE HYDROCHLORIDE DIHYDRATE 1 TABLET: 8; 2 TABLET SUBLINGUAL at 10:04

## 2024-04-18 RX ADMIN — CYCLOBENZAPRINE 5 MG: 10 TABLET, FILM COATED ORAL at 19:50

## 2024-04-18 RX ADMIN — GABAPENTIN 300 MG: 300 CAPSULE ORAL at 19:49

## 2024-04-18 RX ADMIN — GABAPENTIN 300 MG: 300 CAPSULE ORAL at 15:13

## 2024-04-18 RX ADMIN — PIPERACILLIN AND TAZOBACTAM 3375 MG: 3; .375 INJECTION, POWDER, LYOPHILIZED, FOR SOLUTION INTRAVENOUS at 18:01

## 2024-04-18 ASSESSMENT — PAIN SCALES - GENERAL: PAINLEVEL_OUTOF10: 0

## 2024-04-18 NOTE — PROGRESS NOTES
Patient arrived to room 3128 from  ED. Patient is A/Ox4. Oriented patient to room and call light.pt in bed sleeping no needs at this time.  Instructed patient to call for help out of bed, patient verbalized understanding. Patient denies any further needs at this time, will continue to monitor and assess for needs and comfort. Electronically signed by BRINA LOGAN RN on 4/18/2024 at 12:24 AM

## 2024-04-18 NOTE — PROGRESS NOTES
Pt awake in bed alert and oriented x 4. Pt complaining of some difficulty breathing at rest current on 02 on 2L is 97% instructed pt to take a few deep breaths. Respirations even and unlabored at this time. Morning medications administered, pt tolerated well. Pt states he has no other needs at this time. Call light placed within reach. Electronically signed by Leisa Chadwick RN on 4/18/2024 at 11:48 AM

## 2024-04-18 NOTE — PLAN OF CARE
Problem: Discharge Planning  Goal: Discharge to home or other facility with appropriate resources  4/18/2024 1137 by Leisa Chadwick RN  Outcome: Progressing  Flowsheets  Taken 4/18/2024 1137 by Leisa Chadwick RN  Discharge to home or other facility with appropriate resources:   Identify barriers to discharge with patient and caregiver   Arrange for needed discharge resources and transportation as appropriate   Identify discharge learning needs (meds, wound care, etc)   Refer to discharge planning if patient needs post-hospital services based on physician order or complex needs related to functional status, cognitive ability or social support system  Taken 4/18/2024 0402 by Mario Burton RN  Discharge to home or other facility with appropriate resources:   Identify barriers to discharge with patient and caregiver   Arrange for needed discharge resources and transportation as appropriate   Identify discharge learning needs (meds, wound care, etc)   Refer to discharge planning if patient needs post-hospital services based on physician order or complex needs related to functional status, cognitive ability or social support system  4/18/2024 0025 by Aggie Mejias RN  Outcome: Progressing     Problem: Pain  Goal: Verbalizes/displays adequate comfort level or baseline comfort level  4/18/2024 1137 by Leisa Chadwick RN  Outcome: Progressing  Flowsheets (Taken 4/18/2024 1137)  Verbalizes/displays adequate comfort level or baseline comfort level:   Encourage patient to monitor pain and request assistance   Assess pain using appropriate pain scale   Administer analgesics based on type and severity of pain and evaluate response   Implement non-pharmacological measures as appropriate and evaluate response   Consider cultural and social influences on pain and pain management   Notify Licensed Independent Practitioner if interventions unsuccessful or patient reports new pain  4/18/2024 0025 by Aggie Mejias,  RN  Outcome: Progressing     Problem: Skin/Tissue Integrity  Goal: Absence of new skin breakdown  Description: 1.  Monitor for areas of redness and/or skin breakdown  2.  Assess vascular access sites hourly  3.  Every 4-6 hours minimum:  Change oxygen saturation probe site  4.  Every 4-6 hours:  If on nasal continuous positive airway pressure, respiratory therapy assess nares and determine need for appliance change or resting period.  4/18/2024 1137 by Leisa Chadwick, RN  Outcome: Progressing  Note: Assess areas at high risk for skin breakdown   4/18/2024 0025 by Aggie Mejias, RN  Outcome: Progressing     Problem: Safety - Adult  Goal: Free from fall injury  4/18/2024 1137 by Leisa Chadwick, RN  Outcome: Progressing  Flowsheets (Taken 4/18/2024 1137)  Free From Fall Injury: Instruct family/caregiver on patient safety  4/18/2024 0025 by Aggie Mejias, RN  Outcome: Progressing

## 2024-04-18 NOTE — CARE COORDINATION
Attempted to see. Patient's visitor said he ws sleeping at the present.    Electronically signed by MAITE Briggs, JADE, Case Management on 4/18/2024 at 1:08 PM  [place] [phone]    3:06 PM  Case Management Assessment  Initial Evaluation    Date/Time of Evaluation: 4/18/2024 3:06 PM  Assessment Completed by: JADE Broussard    If patient is discharged prior to next notation, then this note serves as note for discharge by case management.    Patient Name: Rashad Looney                   YOB: 1985  Diagnosis: Hyperbilirubinemia [E80.6]  Cellulitis [L03.90]  Elevated transaminase level [R74.01]  Alcoholic cirrhosis of liver with ascites (HCC) [K70.31]  Cellulitis of right lower extremity [L03.115]  Pain of upper abdomen [R10.10]  Acute alcoholic intoxication without complication (HCC) [F10.920]                   Date / Time: 4/17/2024 12:52 PM    Patient Admission Status: Inpatient   Readmission Risk (Low < 19, Mod (19-27), High > 27): Readmission Risk Score: 20.5    Current PCP: Lolis Mcmanus MD  PCP verified by CM? Yes    Chart Reviewed: Yes      History Provided by: Patient, Spouse  Patient Orientation: Alert and Oriented    Patient Cognition: Alert    Hospitalization in the last 30 days (Readmission):  No    If yes, Readmission Assessment in CM Navigator will be completed.    Advance Directives:      Code Status: Full Code   Patient's Primary Decision Maker is: Legal Next of Kin    Primary Decision Maker: Priyanka Looney - Spouse - [phone]    Discharge Planning:    Patient lives with: Spouse/Significant Other Type of Home: Apartment  Primary Care Giver: Self  Patient Support Systems include: Spouse/Significant Other   Current Financial resources: Medicaid  Current community resources: None  Current services prior to admission: None            Current DME:              Type of Home Care services:  None    ADLS  Prior functional level: Independent in ADLs/IADLs  Current functional  his medical care.   Encouraged patient/spouse to review the [employer] determination letter to see if he can appeal, and if so, start the appeal process.  Spouse will review the [employer] letter.   They also requested information on disability-provided patient/spouse with disability and SSI information. Explained that disability application can be made online at www.ssa.gov.   Provided patient with alcohol treatment resources and [facility] [place] phone number. Patient has spoken with Kristan in the past. .     The Plan for Transition of Care is related to the following treatment goals of Hyperbilirubinemia [E80.6]  Cellulitis [L03.90]  Elevated transaminase level [R74.01]  Alcoholic cirrhosis of liver with ascites (HCC) [K70.31]  Cellulitis of right lower extremity [L03.115]  Pain of upper abdomen [R10.10]  Acute alcoholic intoxication without complication (HCC) [F10.920]    The Patient and/or Patient Representative Agree with the Discharge Plan? yes     JADE Broussard  Case Management Department  Ph: [phone]

## 2024-04-18 NOTE — PROGRESS NOTES
4 Eyes Skin Assessment     NAME:  Rashad Looney  YOB: 1985  MEDICAL RECORD NUMBER:  [id number]    The patient is being assessed for  Admission    I agree that at least one RN has performed a thorough Head to Toe Skin Assessment on the patient. ALL assessment sites listed below have been assessed.      Areas assessed by both nurses:    Head, Face, Ears, Shoulders, Back, Chest, Arms, Elbows, Hands, Sacrum. Buttock, Coccyx, Ischium, and Legs. Feet and Heels        Does the Patient have a Wound? No noted wound(s)       Wing Prevention initiated by RN: No  Wound Care Orders initiated by RN: No    Pressure Injury (Stage 3,4, Unstageable, DTI, NWPT, and Complex wounds) if present, place Wound referral order by RN under : No    New Ostomies, if present place, Ostomy referral order under : No     Nurse 1 eSignature: Electronically signed by Mario Burton RN on 4/18/24 at 6:50 AM EDT    **SHARE this note so that the co-signing nurse can place an eSignature**    Nurse 2 eSignature: {Esignature:[id number]}

## 2024-04-18 NOTE — PROGRESS NOTES
Took over pt care at 0330, pt in bed with eyes closed, respirations easy and unlabored, wife at bedside. Maintained standard safety precautions, call light and bedside table within reach.

## 2024-04-18 NOTE — H&P
Hospital Medicine History & Physical    Patient:  Rashad Looney  YOB: 1985  Date of Service: 4/18/24  MRN: [id number]    PCP: Lolis Mcmanus MD    Date of Admission: 4/17/2024      Chief Complaint:    Chief Complaint   Patient presents with    Hematuria     Pt reports blood in urine, LUQ, RUQ abdominal pain, bilateral flank pain 9/10, cellulitis right leg x 3 days. Pt reports daily drinker 6 16 oz merlin's hard lemonade/day. Reports drinking 2 16 oz 1 hour ago. Pt denies hx of DT with withdrawal. Dr. Jacobson pt. Hx Hepatitis C         History Of Present Illness:    The patient is a 39 y.o. male who presents to [facility] with c/o as above  Still is actively drinking  Now wants to stop  Has pain in throat and with swallowing  Not in distress    Past Medical History:        Diagnosis Date    Alcohol use disorder     Chronic liver disease     Class 1 obesity     Esophageal varices (HCC)     Tobacco use disorder        Past Surgical History:        Procedure Laterality Date    UPPER GASTROINTESTINAL ENDOSCOPY N/A 12/26/2023    EGD ESOPHAGOGASTRODUODENOSCOPY ENDOSCOPIC VARICEAL TREATMENT W/ BANDING performed by Jitendra Farmer Jr., DO at [facility] ENDOSCOPY    UPPER GASTROINTESTINAL ENDOSCOPY N/A 1/23/2024    ESOPHAGOGASTRODUODENOSCOPY performed by Lambert Spears MD at [facility] ENDOSCOPY       Family History:  No family history on file.    Medications Prior to Admission:    Prior to Admission medications    Medication Sig Start Date End Date Taking? Authorizing Provider   omeprazole (PRILOSEC) 40 MG delayed release capsule Take 1 capsule by mouth daily   Yes Provider, MD Desean   folic acid (FOLVITE) 1 MG tablet TAKE 1 TABLET BY MOUTH DAILY 2/14/24   Lolis Mcmanus MD   gabapentin (NEURONTIN) 300 MG capsule TAKE ONE CAPSULE BY MOUTH THREE TIMES A

## 2024-04-18 NOTE — PROGRESS NOTES
Seizure pads placed on 4 side rails, vs taken, pt complaining of short of breath satting at 89-92% on RA, placed on 2L O2, pt satting at 96%.

## 2024-04-18 NOTE — PROGRESS NOTES
Pt in bed eyes closed easy to arouse. Caregiver at bedside. Respirations even and unlabored at this time. Pt shows no signs of respiratory distress at this time. Evening medications administered, pt tolerated well. Pt states he has no other needs at this time. Call light placed within reach. Electronically signed by Leisa Chadwick RN on 4/18/2024 at 6:32 PM

## 2024-04-19 VITALS
WEIGHT: 199.08 LBS | BODY MASS INDEX: 29.49 KG/M2 | SYSTOLIC BLOOD PRESSURE: 137 MMHG | TEMPERATURE: 98.4 F | HEIGHT: 69 IN | OXYGEN SATURATION: 97 % | DIASTOLIC BLOOD PRESSURE: 82 MMHG | RESPIRATION RATE: 18 BRPM | HEART RATE: 83 BPM

## 2024-04-19 LAB
ALBUMIN SERPL-MCNC: 3.5 G/DL (ref 3.4–5)
ALBUMIN/GLOB SERPL: 1.3 {RATIO} (ref 1.1–2.2)
ALP SERPL-CCNC: 104 U/L (ref 40–129)
ALT SERPL-CCNC: 40 U/L (ref 10–40)
ANION GAP SERPL CALCULATED.3IONS-SCNC: 11 MMOL/L (ref 3–16)
AST SERPL-CCNC: 97 U/L (ref 15–37)
BILIRUB SERPL-MCNC: 8.5 MG/DL (ref 0–1)
BUN SERPL-MCNC: 11 MG/DL (ref 7–20)
CALCIUM SERPL-MCNC: 8.3 MG/DL (ref 8.3–10.6)
CHLORIDE SERPL-SCNC: 100 MMOL/L (ref 99–110)
CO2 SERPL-SCNC: 24 MMOL/L (ref 21–32)
CREAT SERPL-MCNC: <0.5 MG/DL (ref 0.9–1.3)
DEPRECATED RDW RBC AUTO: 17.2 % (ref 12.4–15.4)
GFR SERPLBLD CREATININE-BSD FMLA CKD-EPI: >90 ML/MIN/{1.73_M2}
GLUCOSE SERPL-MCNC: 83 MG/DL (ref 70–99)
HCT VFR BLD AUTO: 24.8 % (ref 40.5–52.5)
HGB BLD-MCNC: 8.4 G/DL (ref 13.5–17.5)
MCH RBC QN AUTO: 31.4 PG (ref 26–34)
MCHC RBC AUTO-ENTMCNC: 33.8 G/DL (ref 31–36)
MCV RBC AUTO: 92.8 FL (ref 80–100)
PLATELET # BLD AUTO: 42 K/UL (ref 135–450)
PMV BLD AUTO: 9.6 FL (ref 5–10.5)
POTASSIUM SERPL-SCNC: 4 MMOL/L (ref 3.5–5.1)
PROT SERPL-MCNC: 6.2 G/DL (ref 6.4–8.2)
RBC # BLD AUTO: 2.67 M/UL (ref 4.2–5.9)
SODIUM SERPL-SCNC: 135 MMOL/L (ref 136–145)
WBC # BLD AUTO: 2.5 K/UL (ref 4–11)

## 2024-04-19 PROCEDURE — 94760 N-INVAS EAR/PLS OXIMETRY 1: CPT

## 2024-04-19 PROCEDURE — 85027 COMPLETE CBC AUTOMATED: CPT

## 2024-04-19 PROCEDURE — 36415 COLL VENOUS BLD VENIPUNCTURE: CPT

## 2024-04-19 PROCEDURE — 80053 COMPREHEN METABOLIC PANEL: CPT

## 2024-04-19 PROCEDURE — 6370000000 HC RX 637 (ALT 250 FOR IP): Performed by: INTERNAL MEDICINE

## 2024-04-19 PROCEDURE — 2580000003 HC RX 258: Performed by: INTERNAL MEDICINE

## 2024-04-19 PROCEDURE — 99238 HOSP IP/OBS DSCHRG MGMT 30/<: CPT | Performed by: INTERNAL MEDICINE

## 2024-04-19 PROCEDURE — 6360000002 HC RX W HCPCS: Performed by: INTERNAL MEDICINE

## 2024-04-19 RX ORDER — THIAMINE MONONITRATE (VIT B1) 100 MG
100 TABLET ORAL DAILY
Qty: 30 TABLET | Refills: 3 | Status: SHIPPED | OUTPATIENT
Start: 2024-04-20

## 2024-04-19 RX ORDER — AMOXICILLIN AND CLAVULANATE POTASSIUM 875; 125 MG/1; MG/1
1 TABLET, FILM COATED ORAL 2 TIMES DAILY
Qty: 14 TABLET | Refills: 0 | Status: SHIPPED | OUTPATIENT
Start: 2024-04-19 | End: 2024-04-25

## 2024-04-19 RX ORDER — CHLORDIAZEPOXIDE HYDROCHLORIDE 10 MG/1
CAPSULE, GELATIN COATED ORAL
Qty: 34 CAPSULE | Refills: 0 | Status: SHIPPED | OUTPATIENT
Start: 2024-04-19 | End: 2024-05-19

## 2024-04-19 RX ADMIN — BUPRENORPHINE AND NALOXONE 1 FILM: 8; 2 FILM, SOLUBLE BUCCAL; SUBLINGUAL at 11:21

## 2024-04-19 RX ADMIN — PIPERACILLIN AND TAZOBACTAM 3375 MG: 3; .375 INJECTION, POWDER, LYOPHILIZED, FOR SOLUTION INTRAVENOUS at 01:18

## 2024-04-19 RX ADMIN — LORAZEPAM 2 MG: 1 TABLET ORAL at 11:21

## 2024-04-19 RX ADMIN — PIPERACILLIN AND TAZOBACTAM 3375 MG: 3; .375 INJECTION, POWDER, LYOPHILIZED, FOR SOLUTION INTRAVENOUS at 11:34

## 2024-04-19 RX ADMIN — GABAPENTIN 300 MG: 300 CAPSULE ORAL at 11:21

## 2024-04-19 RX ADMIN — Medication 100 MG: at 11:21

## 2024-04-19 RX ADMIN — PANTOPRAZOLE SODIUM 40 MG: 40 TABLET, DELAYED RELEASE ORAL at 05:28

## 2024-04-19 RX ADMIN — Medication 10 ML: at 11:22

## 2024-04-19 RX ADMIN — FOLIC ACID 1 MG: 1 TABLET ORAL at 11:21

## 2024-04-19 ASSESSMENT — PAIN SCALES - GENERAL: PAINLEVEL_OUTOF10: 0

## 2024-04-19 NOTE — PROGRESS NOTES
Pt. Assessment complete. Pt. Lying in bed. Wife at bedside. Pt. Denies any complaints at this time. All needs met.

## 2024-04-19 NOTE — PROGRESS NOTES
RN called Dr. Mcmanus and left message with callback number to inform her of pt's concerns with coreg as well as d/c concerns. Pt diet has been advanced per pt request. EGD was cancelled. Pt aware.

## 2024-04-19 NOTE — CONSULTS
GASTROENTEROLOGY INPATIENT CONSULTATION        IDENTIFYING DATA/REASON FOR CONSULTATION   PATIENT:  Rashad Looney  MRN:  [id number]  ADMIT DATE: 4/17/2024  TIME OF EVALUATION: 4/19/2024 8:33 AM  HOSPITAL STAY:   LOS: 2 days     REASON FOR CONSULTATION:  dysphagia    HISTORY OF PRESENT ILLNESS   Rashad Looney is a 39 y.o. male with a PMH of HCV/ETOH cirrhosis decompensated by nonbleeding esophageal varices, prior ETOH induced pancreatitis and ETOH hepatitis who presented on 4/17/2024 with blood in urine. We have been consulted trouble swallowing    Pt describes feeling a knot in his throat.  Started the day he came here.  The feeling has since improved. Denies pain with swallowing.  He is still actively drinking alcohol, last drink was day of admission.            Prior Endoscopic Evaluations:  EGD 1/23/24 with Dr. Spears   1.The distal esophagus had trace grade 1 varices with several banding ulcers in the distal esophagus. No evidence of active variceal bleeding.     EGD 12/26/23 with Dr. Farmer  1) There were 2 columns of medium Grade II esophageal varices without bleeding stigmata were noted in the distal esophagus.  Using a [employer] multiband ligator, 3 esophageal bands were successfully deployed.  No immediate complications were noted during or after deployment of the bands.  The gastroesophageal junction was at 43 cm from the incisors.   2) There were no gastric varices noted.    3) There was moderate portal gastropathy noted.    4) There was a normal appearing duodenal bulb and second portion of the duodenum.       PAST MEDICAL, SURGICAL, FAMILY, and SOCIAL HISTORY     Past Medical History:   Diagnosis Date    Alcohol use disorder     Chronic liver disease     Class 1 obesity     Esophageal varices (HCC)     Tobacco use disorder      Past Surgical History:   Procedure Laterality Date    UPPER GASTROINTESTINAL ENDOSCOPY N/A 12/26/2023    EGD ESOPHAGOGASTRODUODENOSCOPY ENDOSCOPIC VARICEAL TREATMENT W/ BANDING  Q1H PRN      ALLERGIES:  He   Allergies   Allergen Reactions    Codeine        REVIEW OF SYSTEMS   Pertinent ROS noted in HPI    PHYSICAL EXAM     Vitals:    04/18/24 1927 04/18/24 2231 04/19/24 0529 04/19/24 0720   BP: (!) 143/84   137/82   Pulse: 87   83   Resp: 20 18 18   Temp: 98 °F (36.7 °C)   98.4 °F (36.9 °C)   TempSrc: Oral   Oral   SpO2: 92%   97%   Weight:   90.3 kg (199 lb 1.2 oz)    Height:           I/O last 3 completed shifts:  In: 120 [P.O.:120]  Out: 1900 [Urine:1900]      Physical Exam:  General appearance: alert, cooperative, no distress, appears stated age  Eyes: Anicteric  Head: Normocephalic, without obvious abnormality  Lungs: clear to auscultation bilaterally, Normal Effort  Heart: regular rate and rhythm, normal S1 and S2, no murmurs or rubs  Abdomen: soft, non-distended, non-tender. Bowel sounds normal. No masses,  no organomegaly.   Extremities: atraumatic, no cyanosis or edema  Skin: warm and dry, no jaundice  Neuro: Grossly intact, A&OX3      LABS AND IMAGING   Laboratory   Recent Labs     04/17/24  1330 04/19/24  0517   WBC 3.8* 2.5*   HGB 8.1* 8.4*   HCT 24.5* 24.8*   MCV 94.1 92.8   PLT 56* 42*     Recent Labs     04/17/24  1330 04/19/24  0517    135*   K 3.3* 4.0    100   CO2 27 24   BUN 8 11   CREATININE <0.5* <0.5*     Recent Labs     04/17/24  1330 04/19/24  0517   * 97*   ALT 46* 40   BILITOT 4.2* 8.5*   ALKPHOS 147* 104     Recent Labs     04/17/24  1330   LIPASE 61.0*     No results for input(s): \"PROTIME\", \"INR\" in the last 72 hours.    Imaging  CT TIBIA FIBULA RIGHT W CONTRAST   Final Result   1. Diffuse subcutaneous edema of the lower leg which could be due to   cellulitis. No drainable fluid collection or soft tissue gas identified.   2. Muscle flap over the medial aspect of the proximal tibia without   complication identified.   3. Small nonspecific knee effusion.  No CT evidence of osteomyelitis.         CT TIBIA FIBULA LEFT W CONTRAST   Final Result

## 2024-04-19 NOTE — CARE COORDINATION
CASE MANAGEMENT DISCHARGE SUMMARY:    DISCHARGE DATE: 4/19/2024     DISCHARGED TO HOME               COMMENTS: Provided patient with substance treatment resources and information on disability.     Electronically signed by MAITE Briggs, LISW, Case Management on 4/19/2024 at 12:47 PM  [place] [phone]

## 2024-04-19 NOTE — PROGRESS NOTES
Data- discharge order received, pt verbalized agreement to discharge, disposition to previous residence, no needs for HHC/DME.     Action- discharge instructions prepared and given to patient, pt verbalized understanding. Medication information packet given r/t NEW and/or CHANGED prescriptions emphasizing name/purpose/side effects, pt verbalized understanding. Discharge instruction summary: Diet- as tolerated, Activity- as tolerated, Primary Care Physician as follows: Lolis Mcmanus -[phone] f/u appointment as needed, prescription medications filled w/ pt pharmacy.     Response- Pt belongings gathered, IV removed. Disposition is home (no HHC/DME needs), transported with significant other, refused w/c, walked to lobby w/ belongings, no complications. Information on substance tx given to pt as requested.      Patient to T613 with RT and RN. Patient's brother Jama to maxime. Patient desaturating to the 70s, RT bagging and Dr. Davis at bedside. Plan to bronch. Brother, Jama, back at bedside. Procedure explained. Jama is DPOA and is emailing paperwork to social work team.   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 Jama to bring implants back with  and assist team in placing them for communication with patient. Jama took all of patient's other belongings home at this time.

## 2024-04-19 NOTE — PROGRESS NOTES
Report received at bedside for this pt. Pt A&OX4, and drowsy in bed during bedside report, vital signs stable. RN introduced self and plan for the morning, pt verbalized understanding. Pt reports not taking coreg d/t side effects. White board has been updated for the day, call light within reach and fall precautions in place. Pt has no additional needs at this time. Plan of care ongoing. Electronically signed by Qian Beck RN on 4/19/2024 at 8:49 AM

## 2024-04-19 NOTE — PLAN OF CARE
evaluate response   Consider cultural and social influences on pain and pain management   Notify Licensed Independent Practitioner if interventions unsuccessful or patient reports new pain     Problem: Skin/Tissue Integrity  Goal: Absence of new skin breakdown  Description: 1.  Monitor for areas of redness and/or skin breakdown  2.  Assess vascular access sites hourly  3.  Every 4-6 hours minimum:  Change oxygen saturation probe site  4.  Every 4-6 hours:  If on nasal continuous positive airway pressure, respiratory therapy assess nares and determine need for appliance change or resting period.  4/18/2024 2046 by Roxana Dukes, RN  Outcome: Progressing  4/18/2024 1137 by Leisa Chadwick, RN  Outcome: Progressing  Note: Assess areas at high risk for skin breakdown      Problem: Safety - Adult  Goal: Free from fall injury  4/18/2024 2046 by Roxana Dukes, RN  Outcome: Progressing  4/18/2024 1137 by Leisa Chadwick, RN  Outcome: Progressing  Flowsheets (Taken 4/18/2024 1137)  Free From Fall Injury: Instruct family/caregiver on patient safety

## 2024-05-25 ENCOUNTER — HOSPITAL ENCOUNTER (EMERGENCY)
Age: 39
Discharge: HOME | End: 2024-05-25
Attending: EMERGENCY MEDICINE
Payer: COMMERCIAL

## 2024-05-25 VITALS
HEART RATE: 114 BPM | OXYGEN SATURATION: 98 % | SYSTOLIC BLOOD PRESSURE: 144 MMHG | DIASTOLIC BLOOD PRESSURE: 82 MMHG | RESPIRATION RATE: 23 BRPM

## 2024-05-25 DIAGNOSIS — S01.412A LACERATION OF LEFT CHEEK, INITIAL ENCOUNTER: Primary | ICD-10-CM

## 2024-05-25 PROCEDURE — 12011 RPR F/E/E/N/L/M 2.5 CM/<: CPT

## 2024-05-25 PROCEDURE — 99283 EMERGENCY DEPT VISIT LOW MDM: CPT

## 2024-05-25 RX ORDER — CEPHALEXIN 500 MG/1
500 CAPSULE ORAL 4 TIMES DAILY
Qty: 28 CAPSULE | Refills: 0 | Status: SHIPPED | OUTPATIENT
Start: 2024-05-25 | End: 2024-06-01

## 2024-05-25 ASSESSMENT — PAIN - FUNCTIONAL ASSESSMENT: PAIN_FUNCTIONAL_ASSESSMENT: 0-10

## 2024-05-25 ASSESSMENT — PAIN DESCRIPTION - LOCATION: LOCATION: MOUTH

## 2024-05-25 ASSESSMENT — PAIN SCALES - GENERAL: PAINLEVEL_OUTOF10: 10

## 2024-05-25 NOTE — ED PROVIDER NOTES
I PERSONALLY SAW THE PATIENT AND PERFORMED A SUBSTANTIVE PORTION OF THE VISIT INCLUDING ALL ASPECTS OF THE MEDICAL DECISION MAKING PROCESS.    [facility] EMERGENCY DEPARTMENT  EMERGENCY DEPARTMENT ENCOUNTER      Pt Name: Rashad Looney  MRN: [id number]  Birthdate 1985  Date of evaluation: 5/25/2024  Provider: Suleiman Rogel MD    CHIEF COMPLAINT       Chief Complaint   Patient presents with    Mouth Injury     Pt arrives to the ED from home with c/c of a mouth laceration. Pt states he was kayaking and fishing tonight when a stick impaled through the left side of his mouth.        HISTORY OF PRESENT ILLNESS    Rashad Looney is a 39 y.o. male who presents to the emergency department with laceration.  Patient presents with laceration of the cheek.  States he was kayaking and a stick poked him in the left cheek.  No other associated symptoms.  No rash.    Nursing Notes were reviewed. Including nursing noted for FM, Surgical History, Past Medical History, Social History, vitals, and allergies; agree with all.     REVIEW OF SYSTEMS       Review of Systems    Except as noted above the remainder of the review of systems was reviewed and negative.     PAST MEDICAL HISTORY     Past Medical History:   Diagnosis Date    Alcohol use disorder     Chronic liver disease     Class 1 obesity     Esophageal varices (HCC)     Tobacco use disorder        SURGICAL HISTORY       Past Surgical History:   Procedure Laterality Date    UPPER GASTROINTESTINAL ENDOSCOPY N/A 12/26/2023    EGD ESOPHAGOGASTRODUODENOSCOPY ENDOSCOPIC VARICEAL TREATMENT W/ BANDING performed by Jitendra Farmer Jr., DO at [facility] ENDOSCOPY    UPPER GASTROINTESTINAL ENDOSCOPY N/A 1/23/2024    ESOPHAGOGASTRODUODENOSCOPY performed by Lambert Spears MD at [facility] ENDOSCOPY       CURRENT MEDICATIONS       Previous Medications    ALBUTEROL SULFATE HFA (VENTOLIN HFA) 108 (90 BASE) MCG/ACT INHALER    Inhale 2 puffs into the lungs 4 times daily as  decompensation.    PROCEDURES:  Lac Repair    Date/Time: 5/25/2024 4:36 AM    Performed by: Suleiman Rogel MD  Authorized by: Suleiman Rogel MD    Consent:     Consent obtained:  Verbal    Consent given by:  Patient    Risks, benefits, and alternatives were discussed: yes      Risks discussed:  Infection, need for additional repair and nerve damage    Alternatives discussed:  No treatment  Universal protocol:     Patient identity confirmed:  Verbally with patient  Laceration details:     Location:  Mouth    Mouth location:  L buccal mucosa  Treatment:     Area cleansed with:  Saline    Amount of cleaning:  Standard  Skin repair:     Repair method:  Sutures  Approximation:     Approximation:  Close  Repair type:     Repair type:  Complex  Post-procedure details:     Dressing:  Open (no dressing)    Procedure completion:  Tolerated with difficulty      FINAL IMPRESSION      1. Laceration of left cheek, initial encounter          DISPOSITION/PLAN   DISPOSITION [place] 05/25/2024 04:34:15 AM    PATIENT REFERRED TO:  Lolis Mcmanus MD  [address] [place] [address]  [phone]            DISCHARGE MEDICATIONS:  New Prescriptions    CEPHALEXIN (KEFLEX) 500 MG CAPSULE    Take 1 capsule by mouth 4 times daily for 7 days          (Please note that portions ofthis note were completed with a voice recognition program.  Efforts were made to edit the dictations but occasionally words are mis-transcribed.)    Suleiman Rogel MD(electronically signed)  Attending Emergency Physician        Suleiman Rogel MD  05/25/24 [id number]

## 2024-05-25 NOTE — ED NOTES
D/C: Order noted for d/c. Pt confirmed d/c paperwork have correct name. Discharge and education instructions reviewed with patient. Teach-back successful.  Pt verbalized understanding. Pt denied questions at this time. No acute distress noted. Patient instructed to follow-up as noted - return to emergency department if symptoms worsen. Patient verbalized understanding. Discharged per EDMD with discharge instructions. Pt discharged per to private vehicle. Patient stable upon departure. Thanked patient for choosing [facility] for care.

## 2024-05-25 NOTE — ED NOTES
Pt arrives to the ED from home with c/c of a mouth laceration. Pt states he was kayaking and fishing tonight when a stick impaled through the left side of his mouth.     Pt A&Ox4, VSS.     Dr. Rogel at bedside at this time for assessment.

## 2024-06-02 ENCOUNTER — HOSPITAL ENCOUNTER (EMERGENCY)
Age: 39
Discharge: HOME | End: 2024-06-02
Attending: EMERGENCY MEDICINE
Payer: COMMERCIAL

## 2024-06-02 VITALS
DIASTOLIC BLOOD PRESSURE: 84 MMHG | OXYGEN SATURATION: 100 % | HEART RATE: 100 BPM | TEMPERATURE: 98.4 F | WEIGHT: 198.19 LBS | BODY MASS INDEX: 29.36 KG/M2 | HEIGHT: 69 IN | SYSTOLIC BLOOD PRESSURE: 137 MMHG | RESPIRATION RATE: 24 BRPM

## 2024-06-02 DIAGNOSIS — Z48.02 VISIT FOR SUTURE REMOVAL: Primary | ICD-10-CM

## 2024-06-02 PROCEDURE — 99282 EMERGENCY DEPT VISIT SF MDM: CPT

## 2024-06-02 ASSESSMENT — PAIN DESCRIPTION - ONSET
ONSET: ON-GOING
ONSET: ON-GOING

## 2024-06-02 ASSESSMENT — PAIN DESCRIPTION - ORIENTATION
ORIENTATION: LEFT
ORIENTATION: LEFT

## 2024-06-02 ASSESSMENT — PAIN DESCRIPTION - LOCATION
LOCATION: FACE
LOCATION: FACE

## 2024-06-02 ASSESSMENT — PAIN DESCRIPTION - FREQUENCY
FREQUENCY: CONTINUOUS
FREQUENCY: CONTINUOUS

## 2024-06-02 ASSESSMENT — PAIN DESCRIPTION - DESCRIPTORS
DESCRIPTORS: ACHING
DESCRIPTORS: ACHING

## 2024-06-02 ASSESSMENT — PAIN - FUNCTIONAL ASSESSMENT: PAIN_FUNCTIONAL_ASSESSMENT: 0-10

## 2024-06-02 ASSESSMENT — PAIN SCALES - GENERAL
PAINLEVEL_OUTOF10: 5
PAINLEVEL_OUTOF10: 5

## 2024-06-02 ASSESSMENT — LIFESTYLE VARIABLES
HOW OFTEN DO YOU HAVE A DRINK CONTAINING ALCOHOL: 4 OR MORE TIMES A WEEK
HOW MANY STANDARD DRINKS CONTAINING ALCOHOL DO YOU HAVE ON A TYPICAL DAY: 1 OR 2

## 2024-06-02 NOTE — ED PROVIDER NOTES
[facility]  EMERGENCY DEPARTMENTENCOUNTER      Pt Name: Rashad Looney  MRN: [id number]  Birthdate 1985  Date ofevaluation: 6/2/2024  Provider: Rashad Talley MD    CHIEF COMPLAINT       Chief Complaint   Patient presents with    Suture / Staple Removal       HPI    HISTORY OF PRESENT ILLNESS   (Location/Symptom, Timing/Onset,Context/Setting, Quality, Duration, Modifying Factors, Severity)  Note limiting factors.   Rashad Looney is a 39 y.o. male who presents to the emergency department for suture removal.  This is a 39-year-old male who had sutures placed on May 25 who presents for suture removal.  He denies any complaints.  He denies any fevers or chills.  He denies any discharge from the area.        NursingNotes were reviewed.    Review of Systems    REVIEW OF SYSTEMS    (2-9 systems for level 4, 10 or more for level 5)     Review of Systems   Constitutional: Negative for fever.   HENT: Negative for rhinorrhea and sore throat.    Eyes: Negative for redness.   Respiratory: Negative for shortness of breath.    Cardiovascular: Negative for chest pain.   Gastrointestinal: Negative for abdominal pain.   Genitourinary: Negative for flank pain.   Neurological: Negative for headaches.   Hematological: Negative for adenopathy.   Psychiatric/Behavioral: Negative for confusion.              Except as noted above the remainder of the review of systems was reviewed and negative.       PAST MEDICAL HISTORY     Past Medical History:   Diagnosis Date    Alcohol use disorder     Chronic liver disease     Class 1 obesity     Esophageal varices (HCC)     Tobacco use disorder          SURGICALHISTORY       Past Surgical History:   Procedure Laterality Date    UPPER GASTROINTESTINAL ENDOSCOPY N/A 12/26/2023    EGD ESOPHAGOGASTRODUODENOSCOPY ENDOSCOPIC VARICEAL TREATMENT W/ BANDING performed by Jitendra Farmer Jr., DO at [facility] ENDOSCOPY    UPPER GASTROINTESTINAL ENDOSCOPY N/A 1/23/2024

## 2024-06-02 NOTE — ED TRIAGE NOTES
Verified patient's name and  prior to triage process.    Spouse drove patient here today. Patient is independently ambulatory.    Patient is here for suture removal on left side of face. He is reporting facial pain, and states that he drank a beer this morning (states he drinks 2 beers almost daily) and took some Klonipine for pain.

## 2024-06-02 NOTE — ED NOTES
D/C: Order noted for d/c. Pt confirmed d/c paperwork has correct name. Discharge and education instructions reviewed with patient. Teach-back successful.  Pt verbalized understanding and denied questions at this time. No acute distress noted. Patient instructed to follow-up as noted - return to emergency department if symptoms worsen. Patient verbalized understanding. Discharged per EDMD with discharge instructions. Pt discharged to private vehicle. Patient stable upon departure. Thanked patient for [facility] for care. Provider aware of patient pain at time of discharge.

## 2024-06-06 ENCOUNTER — APPOINTMENT (OUTPATIENT)
Dept: CT IMAGING | Age: 39
DRG: 280 | End: 2024-06-06
Payer: COMMERCIAL

## 2024-06-06 ENCOUNTER — HOSPITAL ENCOUNTER (INPATIENT)
Age: 39
LOS: 2 days | Discharge: HOME | DRG: 280 | End: 2024-06-08
Attending: INTERNAL MEDICINE | Admitting: INTERNAL MEDICINE
Payer: COMMERCIAL

## 2024-06-06 ENCOUNTER — APPOINTMENT (OUTPATIENT)
Dept: GENERAL RADIOLOGY | Age: 39
DRG: 280 | End: 2024-06-06
Payer: COMMERCIAL

## 2024-06-06 DIAGNOSIS — K70.31 ALCOHOLIC CIRRHOSIS OF LIVER WITH ASCITES (HCC): ICD-10-CM

## 2024-06-06 DIAGNOSIS — F10.288 ALCOHOL DEPENDENCE WITH OTHER ALCOHOL-INDUCED DISORDER (HCC): ICD-10-CM

## 2024-06-06 DIAGNOSIS — E87.6 HYPOKALEMIA: ICD-10-CM

## 2024-06-06 DIAGNOSIS — D61.818 PANCYTOPENIA (HCC): ICD-10-CM

## 2024-06-06 DIAGNOSIS — R10.84 GENERALIZED ABDOMINAL PAIN: Primary | ICD-10-CM

## 2024-06-06 DIAGNOSIS — K72.10 END STAGE LIVER DISEASE (HCC): ICD-10-CM

## 2024-06-06 DIAGNOSIS — R17 JAUNDICE, NON-NEONATAL: ICD-10-CM

## 2024-06-06 PROBLEM — D64.9 ANEMIA: Status: ACTIVE | Noted: 2024-06-06

## 2024-06-06 LAB
ABO + RH BLD: NORMAL
ALBUMIN SERPL-MCNC: 3.1 G/DL (ref 3.4–5)
ALBUMIN/GLOB SERPL: 1.1 {RATIO} (ref 1.1–2.2)
ALP SERPL-CCNC: 91 U/L (ref 40–129)
ALT SERPL-CCNC: 26 U/L (ref 10–40)
AMMONIA PLAS-SCNC: 50 UMOL/L (ref 16–60)
ANION GAP SERPL CALCULATED.3IONS-SCNC: 10 MMOL/L (ref 3–16)
AST SERPL-CCNC: 70 U/L (ref 15–37)
BASOPHILS # BLD: 0 K/UL (ref 0–0.2)
BASOPHILS NFR BLD: 0.5 %
BILIRUB SERPL-MCNC: 3.6 MG/DL (ref 0–1)
BLD GP AB SCN SERPL QL: NORMAL
BUN SERPL-MCNC: 4 MG/DL (ref 7–20)
CALCIUM SERPL-MCNC: 8.2 MG/DL (ref 8.3–10.6)
CHLORIDE SERPL-SCNC: 102 MMOL/L (ref 99–110)
CO2 SERPL-SCNC: 25 MMOL/L (ref 21–32)
CREAT SERPL-MCNC: <0.5 MG/DL (ref 0.9–1.3)
DEPRECATED RDW RBC AUTO: 17.3 % (ref 12.4–15.4)
EOSINOPHIL # BLD: 0 K/UL (ref 0–0.6)
EOSINOPHIL NFR BLD: 0.6 %
ETHANOLAMINE SERPL-MCNC: 174 MG/DL (ref 0–0.08)
GFR SERPLBLD CREATININE-BSD FMLA CKD-EPI: >90 ML/MIN/{1.73_M2}
GLUCOSE SERPL-MCNC: 97 MG/DL (ref 70–99)
HCT VFR BLD AUTO: 21 % (ref 40.5–52.5)
HGB BLD-MCNC: 7.1 G/DL (ref 13.5–17.5)
INR PPP: 1.84 (ref 0.85–1.15)
LIPASE SERPL-CCNC: 42 U/L (ref 13–60)
LYMPHOCYTES # BLD: 0.9 K/UL (ref 1–5.1)
LYMPHOCYTES NFR BLD: 27.7 %
MAGNESIUM SERPL-MCNC: 1.9 MG/DL (ref 1.8–2.4)
MCH RBC QN AUTO: 32.6 PG (ref 26–34)
MCHC RBC AUTO-ENTMCNC: 33.9 G/DL (ref 31–36)
MCV RBC AUTO: 96.4 FL (ref 80–100)
MONOCYTES # BLD: 0.5 K/UL (ref 0–1.3)
MONOCYTES NFR BLD: 14.2 %
NEUTROPHILS # BLD: 1.8 K/UL (ref 1.7–7.7)
NEUTROPHILS NFR BLD: 57 %
PLATELET # BLD AUTO: 70 K/UL (ref 135–450)
PMV BLD AUTO: 8.3 FL (ref 5–10.5)
POTASSIUM SERPL-SCNC: 3.1 MMOL/L (ref 3.5–5.1)
PROCALCITONIN SERPL IA-MCNC: 0.05 NG/ML (ref 0–0.15)
PROT SERPL-MCNC: 5.9 G/DL (ref 6.4–8.2)
PROTHROMBIN TIME: 21.4 SEC (ref 11.9–14.9)
RBC # BLD AUTO: 2.18 M/UL (ref 4.2–5.9)
SODIUM SERPL-SCNC: 137 MMOL/L (ref 136–145)
WBC # BLD AUTO: 3.2 K/UL (ref 4–11)

## 2024-06-06 PROCEDURE — 86900 BLOOD TYPING SEROLOGIC ABO: CPT

## 2024-06-06 PROCEDURE — 99285 EMERGENCY DEPT VISIT HI MDM: CPT

## 2024-06-06 PROCEDURE — 86850 RBC ANTIBODY SCREEN: CPT

## 2024-06-06 PROCEDURE — 86923 COMPATIBILITY TEST ELECTRIC: CPT

## 2024-06-06 PROCEDURE — 71045 X-RAY EXAM CHEST 1 VIEW: CPT

## 2024-06-06 PROCEDURE — 83690 ASSAY OF LIPASE: CPT

## 2024-06-06 PROCEDURE — 1200000000 HC SEMI PRIVATE

## 2024-06-06 PROCEDURE — 82140 ASSAY OF AMMONIA: CPT

## 2024-06-06 PROCEDURE — 83735 ASSAY OF MAGNESIUM: CPT

## 2024-06-06 PROCEDURE — 85610 PROTHROMBIN TIME: CPT

## 2024-06-06 PROCEDURE — 74177 CT ABD & PELVIS W/CONTRAST: CPT

## 2024-06-06 PROCEDURE — 84145 PROCALCITONIN (PCT): CPT

## 2024-06-06 PROCEDURE — 82077 ASSAY SPEC XCP UR&BREATH IA: CPT

## 2024-06-06 PROCEDURE — 85025 COMPLETE CBC W/AUTO DIFF WBC: CPT

## 2024-06-06 PROCEDURE — 80053 COMPREHEN METABOLIC PANEL: CPT

## 2024-06-06 PROCEDURE — 6360000002 HC RX W HCPCS: Performed by: NURSE PRACTITIONER

## 2024-06-06 PROCEDURE — 6360000004 HC RX CONTRAST MEDICATION: Performed by: NURSE PRACTITIONER

## 2024-06-06 PROCEDURE — P9016 RBC LEUKOCYTES REDUCED: HCPCS

## 2024-06-06 PROCEDURE — 86901 BLOOD TYPING SEROLOGIC RH(D): CPT

## 2024-06-06 RX ORDER — POTASSIUM CHLORIDE 7.45 MG/ML
10 INJECTION INTRAVENOUS
Status: COMPLETED | OUTPATIENT
Start: 2024-06-07 | End: 2024-06-07

## 2024-06-06 RX ADMIN — IOPAMIDOL 75 ML: 755 INJECTION, SOLUTION INTRAVENOUS at 23:04

## 2024-06-06 RX ADMIN — POTASSIUM CHLORIDE 10 MEQ: 7.46 INJECTION, SOLUTION INTRAVENOUS at 23:39

## 2024-06-06 ASSESSMENT — PAIN - FUNCTIONAL ASSESSMENT: PAIN_FUNCTIONAL_ASSESSMENT: 0-10

## 2024-06-06 ASSESSMENT — PAIN SCALES - GENERAL: PAINLEVEL_OUTOF10: 9

## 2024-06-06 ASSESSMENT — ENCOUNTER SYMPTOMS
ABDOMINAL PAIN: 1
SHORTNESS OF BREATH: 1

## 2024-06-06 ASSESSMENT — PAIN DESCRIPTION - LOCATION: LOCATION: ABDOMEN

## 2024-06-07 ENCOUNTER — ANESTHESIA EVENT (OUTPATIENT)
Dept: ENDOSCOPY | Age: 39
End: 2024-06-07
Payer: COMMERCIAL

## 2024-06-07 ENCOUNTER — APPOINTMENT (OUTPATIENT)
Dept: ULTRASOUND IMAGING | Age: 39
DRG: 280 | End: 2024-06-07
Payer: COMMERCIAL

## 2024-06-07 ENCOUNTER — ANESTHESIA (OUTPATIENT)
Dept: ENDOSCOPY | Age: 39
End: 2024-06-07
Payer: COMMERCIAL

## 2024-06-07 PROBLEM — R10.84 GENERALIZED ABDOMINAL PAIN: Status: ACTIVE | Noted: 2024-06-07

## 2024-06-07 LAB
ALBUMIN FLD-MCNC: 0.4 G/DL
APPEARANCE FLUID: CLEAR
BDY FLUID QUALITY: NORMAL
BLOOD BANK DISPENSE STATUS: NORMAL
BLOOD BANK PRODUCT CODE: NORMAL
BPU ID: NORMAL
CELL COUNT FLUID TYPE: NORMAL
COLOR FLUID: YELLOW
DEPRECATED RDW RBC AUTO: 17.9 % (ref 12.4–15.4)
DEPRECATED RDW RBC AUTO: 19.9 % (ref 12.4–15.4)
DESCRIPTION BLOOD BANK: NORMAL
FERRITIN SERPL IA-MCNC: 38.2 NG/ML (ref 30–400)
FOLATE SERPL-MCNC: 5.94 NG/ML (ref 4.78–24.2)
HCT VFR BLD AUTO: 19 % (ref 40.5–52.5)
HCT VFR BLD AUTO: 23.1 % (ref 40.5–52.5)
HGB BLD-MCNC: 6.3 G/DL (ref 13.5–17.5)
HGB BLD-MCNC: 7.9 G/DL (ref 13.5–17.5)
IRON SATN MFR SERPL: 16 % (ref 20–50)
IRON SERPL-MCNC: 48 UG/DL (ref 59–158)
LYMPHOCYTES NFR FLD: 37 %
MACROPHAGES # FLD: 24 %
MCH RBC QN AUTO: 31.8 PG (ref 26–34)
MCH RBC QN AUTO: 32.1 PG (ref 26–34)
MCHC RBC AUTO-ENTMCNC: 33.3 G/DL (ref 31–36)
MCHC RBC AUTO-ENTMCNC: 34.2 G/DL (ref 31–36)
MCV RBC AUTO: 93.2 FL (ref 80–100)
MCV RBC AUTO: 96.3 FL (ref 80–100)
MESOTHL CELL NFR FLD: 26 %
MONONUCLEAR UNIDENTIFIED CELLS FLUID: 2 %
NEUTROPHIL, FLUID: 11 %
NUC CELL # FLD: 149 /CUMM
PATH REV: YES
PLATELET # BLD AUTO: 55 K/UL (ref 135–450)
PLATELET # BLD AUTO: 60 K/UL (ref 135–450)
PMV BLD AUTO: 8.6 FL (ref 5–10.5)
PMV BLD AUTO: 9.9 FL (ref 5–10.5)
PROT FLD-MCNC: 0.6 G/DL
RBC # BLD AUTO: 1.97 M/UL (ref 4.2–5.9)
RBC # BLD AUTO: 2.48 M/UL (ref 4.2–5.9)
RBC FLUID: <2000 /CUMM
SPECIMEN SOURCE FLD: NORMAL
SPECIMEN VOL FLD: 1000 ML
TIBC SERPL-MCNC: 304 UG/DL (ref 260–445)
TOTAL CELLS COUNTED FLD: 100
VIT B12 SERPL-MCNC: 1314 PG/ML (ref 211–911)
WBC # BLD AUTO: 2.1 K/UL (ref 4–11)
WBC # BLD AUTO: 2.2 K/UL (ref 4–11)

## 2024-06-07 PROCEDURE — 87070 CULTURE OTHR SPECIMN AEROBIC: CPT

## 2024-06-07 PROCEDURE — 82042 OTHER SOURCE ALBUMIN QUAN EA: CPT

## 2024-06-07 PROCEDURE — 87015 SPECIMEN INFECT AGNT CONCNTJ: CPT

## 2024-06-07 PROCEDURE — 83550 IRON BINDING TEST: CPT

## 2024-06-07 PROCEDURE — 6370000000 HC RX 637 (ALT 250 FOR IP): Performed by: INTERNAL MEDICINE

## 2024-06-07 PROCEDURE — 82728 ASSAY OF FERRITIN: CPT

## 2024-06-07 PROCEDURE — 2500000003 HC RX 250 WO HCPCS: Performed by: NURSE ANESTHETIST, CERTIFIED REGISTERED

## 2024-06-07 PROCEDURE — 88305 TISSUE EXAM BY PATHOLOGIST: CPT

## 2024-06-07 PROCEDURE — 84157 ASSAY OF PROTEIN OTHER: CPT

## 2024-06-07 PROCEDURE — 6360000002 HC RX W HCPCS: Performed by: NURSE PRACTITIONER

## 2024-06-07 PROCEDURE — 2580000003 HC RX 258: Performed by: STUDENT IN AN ORGANIZED HEALTH CARE EDUCATION/TRAINING PROGRAM

## 2024-06-07 PROCEDURE — 88112 CYTOPATH CELL ENHANCE TECH: CPT

## 2024-06-07 PROCEDURE — 36430 TRANSFUSION BLD/BLD COMPNT: CPT

## 2024-06-07 PROCEDURE — 99222 1ST HOSP IP/OBS MODERATE 55: CPT | Performed by: INTERNAL MEDICINE

## 2024-06-07 PROCEDURE — 6360000002 HC RX W HCPCS: Performed by: INTERNAL MEDICINE

## 2024-06-07 PROCEDURE — C9113 INJ PANTOPRAZOLE SODIUM, VIA: HCPCS | Performed by: INTERNAL MEDICINE

## 2024-06-07 PROCEDURE — 2709999900 HC NON-CHARGEABLE SUPPLY: Performed by: INTERNAL MEDICINE

## 2024-06-07 PROCEDURE — 2580000003 HC RX 258: Performed by: INTERNAL MEDICINE

## 2024-06-07 PROCEDURE — 0W9G3ZZ DRAINAGE OF PERITONEAL CAVITY, PERCUTANEOUS APPROACH: ICD-10-PCS | Performed by: PSYCHIATRY & NEUROLOGY

## 2024-06-07 PROCEDURE — 1200000000 HC SEMI PRIVATE

## 2024-06-07 PROCEDURE — 6360000002 HC RX W HCPCS: Performed by: NURSE ANESTHETIST, CERTIFIED REGISTERED

## 2024-06-07 PROCEDURE — 83540 ASSAY OF IRON: CPT

## 2024-06-07 PROCEDURE — 94760 N-INVAS EAR/PLS OXIMETRY 1: CPT

## 2024-06-07 PROCEDURE — 82607 VITAMIN B-12: CPT

## 2024-06-07 PROCEDURE — 06L38CZ OCCLUSION OF ESOPHAGEAL VEIN WITH EXTRALUMINAL DEVICE, VIA NATURAL OR ARTIFICIAL OPENING ENDOSCOPIC: ICD-10-PCS | Performed by: INTERNAL MEDICINE

## 2024-06-07 PROCEDURE — 82746 ASSAY OF FOLIC ACID SERUM: CPT

## 2024-06-07 PROCEDURE — 2580000003 HC RX 258: Performed by: NURSE ANESTHETIST, CERTIFIED REGISTERED

## 2024-06-07 PROCEDURE — 87205 SMEAR GRAM STAIN: CPT

## 2024-06-07 PROCEDURE — 0W3P8ZZ CONTROL BLEEDING IN GASTROINTESTINAL TRACT, VIA NATURAL OR ARTIFICIAL OPENING ENDOSCOPIC: ICD-10-PCS | Performed by: INTERNAL MEDICINE

## 2024-06-07 PROCEDURE — 89051 BODY FLUID CELL COUNT: CPT

## 2024-06-07 PROCEDURE — 7100000001 HC PACU RECOVERY - ADDTL 15 MIN: Performed by: INTERNAL MEDICINE

## 2024-06-07 PROCEDURE — 7100000000 HC PACU RECOVERY - FIRST 15 MIN: Performed by: INTERNAL MEDICINE

## 2024-06-07 PROCEDURE — 36415 COLL VENOUS BLD VENIPUNCTURE: CPT

## 2024-06-07 PROCEDURE — 3700000001 HC ADD 15 MINUTES (ANESTHESIA): Performed by: INTERNAL MEDICINE

## 2024-06-07 PROCEDURE — 3700000000 HC ANESTHESIA ATTENDED CARE: Performed by: INTERNAL MEDICINE

## 2024-06-07 PROCEDURE — 94640 AIRWAY INHALATION TREATMENT: CPT

## 2024-06-07 PROCEDURE — C1729 CATH, DRAINAGE: HCPCS

## 2024-06-07 PROCEDURE — 3609012300 HC EGD BAND LIGATION ESOPHGEAL/GASTRIC VARICES: Performed by: INTERNAL MEDICINE

## 2024-06-07 PROCEDURE — 85027 COMPLETE CBC AUTOMATED: CPT

## 2024-06-07 RX ORDER — SODIUM CHLORIDE 0.9 % (FLUSH) 0.9 %
5-40 SYRINGE (ML) INJECTION PRN
Status: DISCONTINUED | OUTPATIENT
Start: 2024-06-07 | End: 2024-06-08 | Stop reason: HOSPADM

## 2024-06-07 RX ORDER — IPRATROPIUM BROMIDE AND ALBUTEROL SULFATE 2.5; .5 MG/3ML; MG/3ML
1 SOLUTION RESPIRATORY (INHALATION)
Status: DISCONTINUED | OUTPATIENT
Start: 2024-06-07 | End: 2024-06-08 | Stop reason: HOSPADM

## 2024-06-07 RX ORDER — BUPRENORPHINE HYDROCHLORIDE AND NALOXONE HYDROCHLORIDE DIHYDRATE 8; 2 MG/1; MG/1
1 TABLET SUBLINGUAL 2 TIMES DAILY
Status: DISCONTINUED | OUTPATIENT
Start: 2024-06-07 | End: 2024-06-08 | Stop reason: HOSPADM

## 2024-06-07 RX ORDER — PROPOFOL 10 MG/ML
INJECTION, EMULSION INTRAVENOUS PRN
Status: DISCONTINUED | OUTPATIENT
Start: 2024-06-07 | End: 2024-06-07 | Stop reason: SDUPTHER

## 2024-06-07 RX ORDER — LORAZEPAM 1 MG/1
3 TABLET ORAL
Status: DISCONTINUED | OUTPATIENT
Start: 2024-06-07 | End: 2024-06-08 | Stop reason: HOSPADM

## 2024-06-07 RX ORDER — LORAZEPAM 2 MG/ML
1 INJECTION INTRAMUSCULAR
Status: DISCONTINUED | OUTPATIENT
Start: 2024-06-07 | End: 2024-06-08 | Stop reason: HOSPADM

## 2024-06-07 RX ORDER — LORAZEPAM 2 MG/ML
4 INJECTION INTRAMUSCULAR
Status: DISCONTINUED | OUTPATIENT
Start: 2024-06-07 | End: 2024-06-08 | Stop reason: HOSPADM

## 2024-06-07 RX ORDER — NICOTINE 21 MG/24HR
1 PATCH, TRANSDERMAL 24 HOURS TRANSDERMAL DAILY
Status: DISCONTINUED | OUTPATIENT
Start: 2024-06-07 | End: 2024-06-08 | Stop reason: HOSPADM

## 2024-06-07 RX ORDER — SODIUM CHLORIDE 9 MG/ML
INJECTION, SOLUTION INTRAVENOUS PRN
Status: DISCONTINUED | OUTPATIENT
Start: 2024-06-07 | End: 2024-06-08 | Stop reason: HOSPADM

## 2024-06-07 RX ORDER — LACTULOSE 10 G/15ML
20 SOLUTION ORAL 3 TIMES DAILY
Status: DISCONTINUED | OUTPATIENT
Start: 2024-06-07 | End: 2024-06-08 | Stop reason: HOSPADM

## 2024-06-07 RX ORDER — LORAZEPAM 1 MG/1
4 TABLET ORAL
Status: DISCONTINUED | OUTPATIENT
Start: 2024-06-07 | End: 2024-06-08 | Stop reason: HOSPADM

## 2024-06-07 RX ORDER — LORAZEPAM 2 MG/ML
3 INJECTION INTRAMUSCULAR
Status: DISCONTINUED | OUTPATIENT
Start: 2024-06-07 | End: 2024-06-08 | Stop reason: HOSPADM

## 2024-06-07 RX ORDER — GAUZE BANDAGE 2" X 2"
100 BANDAGE TOPICAL DAILY
Status: DISCONTINUED | OUTPATIENT
Start: 2024-06-07 | End: 2024-06-07

## 2024-06-07 RX ORDER — LORAZEPAM 1 MG/1
1 TABLET ORAL
Status: DISCONTINUED | OUTPATIENT
Start: 2024-06-07 | End: 2024-06-08 | Stop reason: HOSPADM

## 2024-06-07 RX ORDER — SODIUM CHLORIDE 0.9 % (FLUSH) 0.9 %
5-40 SYRINGE (ML) INJECTION EVERY 12 HOURS SCHEDULED
Status: DISCONTINUED | OUTPATIENT
Start: 2024-06-07 | End: 2024-06-08 | Stop reason: HOSPADM

## 2024-06-07 RX ORDER — LIDOCAINE HYDROCHLORIDE 20 MG/ML
INJECTION, SOLUTION EPIDURAL; INFILTRATION; INTRACAUDAL; PERINEURAL PRN
Status: DISCONTINUED | OUTPATIENT
Start: 2024-06-07 | End: 2024-06-07 | Stop reason: SDUPTHER

## 2024-06-07 RX ORDER — SODIUM CHLORIDE 9 MG/ML
INJECTION, SOLUTION INTRAVENOUS CONTINUOUS PRN
Status: DISCONTINUED | OUTPATIENT
Start: 2024-06-07 | End: 2024-06-07 | Stop reason: SDUPTHER

## 2024-06-07 RX ORDER — GLYCOPYRROLATE 0.2 MG/ML
INJECTION INTRAMUSCULAR; INTRAVENOUS PRN
Status: DISCONTINUED | OUTPATIENT
Start: 2024-06-07 | End: 2024-06-07 | Stop reason: SDUPTHER

## 2024-06-07 RX ORDER — NALOXONE HYDROCHLORIDE 0.4 MG/ML
INJECTION, SOLUTION INTRAMUSCULAR; INTRAVENOUS; SUBCUTANEOUS PRN
Status: DISCONTINUED | OUTPATIENT
Start: 2024-06-07 | End: 2024-06-08 | Stop reason: HOSPADM

## 2024-06-07 RX ORDER — LEVOFLOXACIN 5 MG/ML
500 INJECTION, SOLUTION INTRAVENOUS EVERY 24 HOURS
Status: DISCONTINUED | OUTPATIENT
Start: 2024-06-07 | End: 2024-06-08 | Stop reason: HOSPADM

## 2024-06-07 RX ORDER — LORAZEPAM 1 MG/1
2 TABLET ORAL
Status: DISCONTINUED | OUTPATIENT
Start: 2024-06-07 | End: 2024-06-08 | Stop reason: HOSPADM

## 2024-06-07 RX ORDER — LORAZEPAM 2 MG/ML
2 INJECTION INTRAMUSCULAR
Status: DISCONTINUED | OUTPATIENT
Start: 2024-06-07 | End: 2024-06-08 | Stop reason: HOSPADM

## 2024-06-07 RX ORDER — ONDANSETRON 2 MG/ML
4 INJECTION INTRAMUSCULAR; INTRAVENOUS
Status: DISCONTINUED | OUTPATIENT
Start: 2024-06-07 | End: 2024-06-08 | Stop reason: HOSPADM

## 2024-06-07 RX ORDER — GAUZE BANDAGE 2" X 2"
100 BANDAGE TOPICAL DAILY
Status: DISCONTINUED | OUTPATIENT
Start: 2024-06-07 | End: 2024-06-08 | Stop reason: HOSPADM

## 2024-06-07 RX ADMIN — GLYCOPYRROLATE 0.2 MG: 0.2 INJECTION INTRAMUSCULAR; INTRAVENOUS at 12:29

## 2024-06-07 RX ADMIN — BUPRENORPHINE HYDROCHLORIDE AND NALOXONE HYDROCHLORIDE DIHYDRATE 1 TABLET: 8; 2 TABLET SUBLINGUAL at 02:21

## 2024-06-07 RX ADMIN — SODIUM CHLORIDE, PRESERVATIVE FREE 5 ML: 5 INJECTION INTRAVENOUS at 21:11

## 2024-06-07 RX ADMIN — PANTOPRAZOLE SODIUM 40 MG: 40 INJECTION, POWDER, FOR SOLUTION INTRAVENOUS at 08:59

## 2024-06-07 RX ADMIN — PROPOFOL 50 MG: 10 INJECTION, EMULSION INTRAVENOUS at 12:38

## 2024-06-07 RX ADMIN — PROPOFOL 100 MG: 10 INJECTION, EMULSION INTRAVENOUS at 12:29

## 2024-06-07 RX ADMIN — POTASSIUM CHLORIDE 10 MEQ: 7.46 INJECTION, SOLUTION INTRAVENOUS at 00:44

## 2024-06-07 RX ADMIN — PROPOFOL 50 MG: 10 INJECTION, EMULSION INTRAVENOUS at 12:31

## 2024-06-07 RX ADMIN — SODIUM CHLORIDE, PRESERVATIVE FREE 5 ML: 5 INJECTION INTRAVENOUS at 21:10

## 2024-06-07 RX ADMIN — BUPRENORPHINE HYDROCHLORIDE AND NALOXONE HYDROCHLORIDE DIHYDRATE 1 TABLET: 8; 2 TABLET SUBLINGUAL at 21:09

## 2024-06-07 RX ADMIN — LIDOCAINE HYDROCHLORIDE 60 MG: 20 INJECTION, SOLUTION EPIDURAL; INFILTRATION; INTRACAUDAL; PERINEURAL at 12:29

## 2024-06-07 RX ADMIN — SODIUM CHLORIDE: 9 INJECTION, SOLUTION INTRAVENOUS at 12:22

## 2024-06-07 RX ADMIN — SODIUM CHLORIDE, PRESERVATIVE FREE 10 ML: 5 INJECTION INTRAVENOUS at 09:00

## 2024-06-07 RX ADMIN — PROPOFOL 50 MG: 10 INJECTION, EMULSION INTRAVENOUS at 12:34

## 2024-06-07 RX ADMIN — LACTULOSE 20 G: 20 SOLUTION ORAL at 14:47

## 2024-06-07 RX ADMIN — LORAZEPAM 2 MG: 2 INJECTION INTRAMUSCULAR; INTRAVENOUS at 18:18

## 2024-06-07 RX ADMIN — LEVOFLOXACIN 500 MG: 5 INJECTION, SOLUTION INTRAVENOUS at 02:57

## 2024-06-07 RX ADMIN — MOMETASONE FUROATE AND FORMOTEROL FUMARATE DIHYDRATE 2 PUFF: 100; 5 AEROSOL RESPIRATORY (INHALATION) at 07:55

## 2024-06-07 RX ADMIN — BUPRENORPHINE HYDROCHLORIDE AND NALOXONE HYDROCHLORIDE DIHYDRATE 1 TABLET: 8; 2 TABLET SUBLINGUAL at 08:58

## 2024-06-07 RX ADMIN — LACTULOSE 20 G: 20 SOLUTION ORAL at 21:10

## 2024-06-07 RX ADMIN — LACTULOSE 20 G: 20 SOLUTION ORAL at 08:58

## 2024-06-07 RX ADMIN — POTASSIUM CHLORIDE 10 MEQ: 7.46 INJECTION, SOLUTION INTRAVENOUS at 02:00

## 2024-06-07 RX ADMIN — MOMETASONE FUROATE AND FORMOTEROL FUMARATE DIHYDRATE 2 PUFF: 100; 5 AEROSOL RESPIRATORY (INHALATION) at 20:46

## 2024-06-07 RX ADMIN — Medication 100 MG: at 08:58

## 2024-06-07 ASSESSMENT — LIFESTYLE VARIABLES: SMOKING_STATUS: 1

## 2024-06-07 ASSESSMENT — PAIN - FUNCTIONAL ASSESSMENT
PAIN_FUNCTIONAL_ASSESSMENT: 0-10
PAIN_FUNCTIONAL_ASSESSMENT: NONE - DENIES PAIN
PAIN_FUNCTIONAL_ASSESSMENT: PREVENTS OR INTERFERES SOME ACTIVE ACTIVITIES AND ADLS

## 2024-06-07 ASSESSMENT — PAIN DESCRIPTION - DESCRIPTORS: DESCRIPTORS: TIGHTNESS;PRESSURE

## 2024-06-07 ASSESSMENT — PAIN SCALES - WONG BAKER: WONGBAKER_NUMERICALRESPONSE: NO HURT

## 2024-06-07 NOTE — ED PROVIDER NOTES
[facility] EMERGENCY DEPARTMENT  [address] [facility] [address]  Dept: [phone]  Loc: [phone]  eMERGENCYdEPARTMENT eNCOUnter      Pt Name: Rashad Looney  MRN: [id number]  Birthdate 1985  Date of evaluation: 6/6/2024  Provider:CARLOS Felder CNP      Independently seen and evaluated by the advanced practice provider  CHIEF COMPLAINT       Chief Complaint   Patient presents with    Abdominal Pain     Pt presents to the ER complaining of abd pain. Pt has a hx of cirrhosis. Pt is also complaining of SOB and using his inhaler in the triage area. Pt states he is supposed to go to  to have treatments.         CRITICAL CARE TIME           HISTORY OF PRESENT ILLNESS  (Location/Symptom, Timing/Onset, Context/Setting, Quality, Duration,Modifying Factors, Severity.)   Rashad Looney is a 39 y.o. male who presents to the emergency department PMHx: Alcohol dependence, chronic liver disease, esophageal varices, anemia    Lives at home  Social determinants: Disability due to comorbid conditions-> still continues to drink alcohol daily  CODE STATUS full  Anticoagulation therapy none    HPI provided by the patient    Patient presents to the emergency department complaining of abdominal pain and distention also complaining of some shortness of breath.    Patient self endorses he continues to drink  Reports that his blood count has been low and he is required transfusions        Nursing Notes were reviewedand agreed with or any disagreements were addressed in the HPI.    REVIEW OF SYSTEMS    (2-9 systems for level 4, 10 or more for level 5)     Review of Systems   Constitutional:  Positive for activity change and appetite change.   HENT: Negative.     Respiratory:  Positive for shortness of breath.    Cardiovascular: Negative.    Gastrointestinal:  Positive for abdominal pain.       Except as noted above the remainder of the review of systems was  GI    PROCEDURES:  Procedures    FINAL IMPRESSION      1. Generalized abdominal pain    2. End stage liver disease (HCC)    3. Alcoholic cirrhosis of liver with ascites (HCC)    4. Jaundice, non-    5. Alcohol dependence with other alcohol-induced disorder (HCC)    6. Pancytopenia (HCC)    7. Hypokalemia          DISPOSITION/PLAN   [unfilled]    PATIENT REFERRED TO:  No follow-up provider specified.    DISCHARGE MEDICATIONS:  New Prescriptions    No medications on file       (Please note that portions of this note were completed with a voice recognition program.  Efforts were made to edit the dictations but occasionally words are mis-transcribed.)    CARLOS Felder - CNP    This dictation was performed with a verbal recognition program (DRAGON) and it was checked for errors. It is possible that there are still dictated errors within this office note. If so, please bring any errors to my attention for an addendum. All efforts were made to ensure that this office note is accurate.        Rizwana Rodriguez, CARLOS - CNP  [phone]       Rizwana Rodriguez APRN - CNP  [phone]

## 2024-06-07 NOTE — PROGRESS NOTES
Patient reporting feeling more anxious and stated he wants to go home. This RN encouraged he stay until MD puts DC order and he is medically cleared. CIWA re scored at 13. 2mg of ativan administered per protocol. Will reassess score  in one hour.  Electronically signed by DANIELA RUELAS RN on 6/7/2024 at 6:21 PM

## 2024-06-07 NOTE — PROGRESS NOTES
Patient up in bed and aaox4. Head to toe assessment complete and charted. Morning medications administered with no complications. CIWA score currently a 5. Will continue to score per protocol. Patient denies any pain this am.     Consent for EGD and blood signed by patient and in chart.   Fall precautions in place. Wife at bedside.    Electronically signed by DANIELA RUELAS RN on 6/7/2024 at 9:44 AM

## 2024-06-07 NOTE — PLAN OF CARE
Problem: Discharge Planning  Goal: Discharge to home or other facility with appropriate resources  Outcome: Progressing  Flowsheets (Taken 6/7/2024 0304)  Discharge to home or other facility with appropriate resources: Identify barriers to discharge with patient and caregiver     Problem: Pain  Goal: Verbalizes/displays adequate comfort level or baseline comfort level  Outcome: Progressing  Flowsheets (Taken 6/7/2024 0304)  Verbalizes/displays adequate comfort level or baseline comfort level: Encourage patient to monitor pain and request assistance     Problem: Safety - Adult  Goal: Free from fall injury  Outcome: Progressing  Flowsheets (Taken 6/7/2024 0304)  Free From Fall Injury: Instruct family/caregiver on patient safety     Problem: ABCDS Injury Assessment  Goal: Absence of physical injury  Outcome: Progressing  Flowsheets (Taken 6/7/2024 0304)  Absence of Physical Injury: Implement safety measures based on patient assessment

## 2024-06-07 NOTE — H&P
Pre-operative History and Physical    Patient: Rashad Looney  : 1985  Acct#:     Intended Procedure:  EGD    HISTORY OF PRESENT ILLNESS:  The patient is a 39 y.o. male  who presents for/due to anemia in the setting of decompensated cirrhosis       Past Medical History:        Diagnosis Date    Alcohol use disorder     Chronic liver disease     Class 1 obesity     Esophageal varices (HCC)     Tobacco use disorder      Past Surgical History:        Procedure Laterality Date    UPPER GASTROINTESTINAL ENDOSCOPY N/A 2023    EGD ESOPHAGOGASTRODUODENOSCOPY ENDOSCOPIC VARICEAL TREATMENT W/ BANDING performed by Jitendra Farmer Jr., DO at [facility] ENDOSCOPY    UPPER GASTROINTESTINAL ENDOSCOPY N/A 2024    ESOPHAGOGASTRODUODENOSCOPY performed by Lambert Spears MD at [facility] ENDOSCOPY     Medications Prior to Admission:   No current facility-administered medications on file prior to encounter.     Current Outpatient Medications on File Prior to Encounter   Medication Sig Dispense Refill    chlordiazePOXIDE (LIBRIUM) 10 MG capsule TAKE 1 CAPSULE BY MOUTH TWICE A DAY AS NEEDED 30 capsule 0    cetirizine (ZYRTEC) 10 MG tablet TAKE ONE TABLET BY MOUTH DAILY 30 tablet 2    omeprazole (PRILOSEC) 40 MG delayed release capsule TAKE ONE CAPSULE BY MOUTH EVERY MORNING BEFORE BREAKFAST 30 capsule 3    gabapentin (NEURONTIN) 300 MG capsule TAKE ONE CAPSULE BY MOUTH THREE TIMES A DAY IN THE MORNING, AT NOON, AND AT BEDTIME 90 capsule 1    mometasone-formoterol (DULERA) 100-5 MCG/ACT inhaler Inhale 2 puffs into the lungs 2 times daily 1 each 2    furosemide (LASIX) 20 MG tablet Take 1 tablet by mouth daily (Patient not taking: Reported on 2024) 30 tablet 3    albuterol sulfate HFA (VENTOLIN HFA) 108 (90 Base) MCG/ACT inhaler Inhale 2 puffs into the lungs 4 times daily as needed for Wheezing 18 g 5    thiamine mononitrate (THIAMINE) 100 MG tablet Take 1 tablet by mouth daily 30 tablet 3    folic acid  (FOLVITE) 1 MG tablet TAKE 1 TABLET BY MOUTH DAILY 30 tablet 3    sildenafil (VIAGRA) 100 MG tablet 1 tab po daily prn 10 tablet 3    vitamin D (ERGOCALCIFEROL) 1.25 MG (13382 UT) CAPS capsule Take 1 capsule by mouth once a week 4 capsule 5    buprenorphine-naloxone (SUBOXONE) 8-2 MG SUBL SL tablet Place 1 tablet under the tongue in the morning and at bedtime.          Allergies:  Codeine    Social History:   TOBACCO:   reports that he has been smoking cigarettes. He has never used smokeless tobacco.  ETOH:   reports current alcohol use.  DRUGS:   reports that he does not currently use drugs.    PHYSICAL EXAM:      Vital Signs: /81   Pulse 95   Temp 98.2 °F (36.8 °C) (Oral)   Resp 24   Ht 1.753 m (5' 9.02\")   Wt 90.5 kg (199 lb 8.3 oz)   SpO2 94%   BMI 29.45 kg/m²    Airway: No stridor or wheezing noted.  Good air movement  Pulmonary: without wheezes.  Clear to auscultation  Cardiac:regular rate and rhythm without loud murmurs  Abdomen:soft, nontender,  Bowel sounds present    Pre-Procedure Assessment / Plan:  1) Cirrhosis with ascites   2) Anemia     ASA Grade:  ASA 4 - Patient with severe systemic disease that is a constant threat to life  Mallampati Classification:  Class II    Level of Sedation Plan:Deep sedation    Post Procedure plan: Return to same level of care    I assessed the patient and find that the patient is in satisfactory condition to proceed with the planned procedure and sedation plan.    I have explained the risk, benefits, and alternatives to the procedure; the patient understands and agrees to proceed.     The patient was counseled at length about the risks of dillon Covid-19 during their perioperative period and any recovery window from their procedure.  The patient was made aware that dillon Covid-19  may worsen their prognosis for recovering from their procedure  and lend to a higher morbidity and/or mortality risk.  All material risks, benefits, and reasonable

## 2024-06-07 NOTE — PLAN OF CARE
Problem: Discharge Planning  Goal: Discharge to home or other facility with appropriate resources  6/7/2024 0922 by Arturo Stephens RN  Outcome: Progressing  6/7/2024 0304 by Erendira Rubalcava RN  Outcome: Progressing  Flowsheets  Taken 6/7/2024 0304 by Erendira Rubalcava RN  Discharge to home or other facility with appropriate resources: Identify barriers to discharge with patient and caregiver  Taken 6/7/2024 0211 by Arturo Stephens RN  Discharge to home or other facility with appropriate resources:   Identify barriers to discharge with patient and caregiver   Arrange for needed discharge resources and transportation as appropriate     Problem: Pain  Goal: Verbalizes/displays adequate comfort level or baseline comfort level  6/7/2024 0922 by Arturo Stephens RN  Outcome: Progressing  6/7/2024 0304 by Erendira Rubalcava RN  Outcome: Progressing  Flowsheets (Taken 6/7/2024 0304)  Verbalizes/displays adequate comfort level or baseline comfort level: Encourage patient to monitor pain and request assistance     Problem: Safety - Adult  Goal: Free from fall injury  6/7/2024 0922 by Arturo Stephens RN  Outcome: Progressing  Flowsheets (Taken 6/7/2024 0908)  Free From Fall Injury:   Instruct family/caregiver on patient safety   Based on caregiver fall risk screen, instruct family/caregiver to ask for assistance with transferring infant if caregiver noted to have fall risk factors  6/7/2024 0304 by Erendira Rubalcava RN  Outcome: Progressing  Flowsheets (Taken 6/7/2024 0304)  Free From Fall Injury: Instruct family/caregiver on patient safety     Problem: ABCDS Injury Assessment  Goal: Absence of physical injury  6/7/2024 0922 by Arturo Stephens RN  Outcome: Progressing  6/7/2024 0304 by Erendira Rubalcava RN  Outcome: Progressing  Flowsheets (Taken 6/7/2024 0304)  Absence of Physical Injury: Implement safety measures based on patient assessment     Problem: Nutrition Deficit:  Goal: Optimize nutritional status  6/7/2024 0922 by Arturo Stephens RN  Outcome:

## 2024-06-07 NOTE — H&P
MD Lolis   gabapentin (NEURONTIN) 300 MG capsule TAKE ONE CAPSULE BY MOUTH THREE TIMES A DAY IN THE MORNING, AT NOON, AND AT BEDTIME 4/25/24 6/7/24  Lolis Mcmanus MD   mometasone-formoterol (DULERA) 100-5 MCG/ACT inhaler Inhale 2 puffs into the lungs 2 times daily 4/25/24   Lolis Mcmanus MD   furosemide (LASIX) 20 MG tablet Take 1 tablet by mouth daily  Patient not taking: Reported on 6/2/2024 4/25/24   Lolis Mcmanus MD   albuterol sulfate HFA (VENTOLIN HFA) 108 (90 Base) MCG/ACT inhaler Inhale 2 puffs into the lungs 4 times daily as needed for Wheezing 4/25/24   Lolis Mcmanus MD   thiamine mononitrate (THIAMINE) 100 MG tablet Take 1 tablet by mouth daily 4/20/24   Lolis Mcmanus MD   folic acid (FOLVITE) 1 MG tablet TAKE 1 TABLET BY MOUTH DAILY 2/14/24   Lolis Mcmanus MD   sildenafil (VIAGRA) 100 MG tablet 1 tab po daily prn 1/11/24   Lolsi Mcmanus MD   vitamin D (ERGOCALCIFEROL) 1.25 MG (87799 UT) CAPS capsule Take 1 capsule by mouth once a week 8/15/23   Lolis Mcmanus MD   buprenorphine-naloxone (SUBOXONE) 8-2 MG SUBL SL tablet Place 1 tablet under the tongue in the morning and at bedtime.    Provider, MD Desean       Allergies:  Codeine    Social History:    Social History     Socioeconomic History    Marital status: Single     Spouse name: Not on file    Number of children: Not on file    Years of education: Not on file    Highest education level: Not on file   Occupational History    Not on file   Tobacco Use    Smoking status: Every Day     Current packs/day: 1.00     Types: Cigarettes    Smokeless tobacco: Never   Vaping Use    Vaping Use: Never used   Substance and Sexual Activity    Alcohol use: Yes     Comment: \"a lot\" - 4/16/24    Drug use: Not Currently     Comment: suboxon    Sexual activity: Yes     Partners: Female   Other Topics Concern    Not on file   Social History Narrative    Not on file     Social Determinants of Health     Financial      COAG  Recent Labs     06/06/24  2146   INR 1.84*     CARDIAC ENZYMES  No results for input(s): \"CKTOTAL\", \"CKMB\", \"CKMBINDEX\", \"TROPONINI\" in the last 72 hours.    U/A:    Lab Results   Component Value Date/Time    COLORU ORANGE 04/17/2024 01:30 PM    WBCUA 1 04/17/2024 01:30 PM    RBCUA 1 04/17/2024 01:30 PM    BACTERIA None Seen 04/17/2024 01:30 PM    CLARITYU SL CLOUDY 04/17/2024 01:30 PM    LEUKOCYTESUR Color Interfer 04/17/2024 01:30 PM    BLOODU Color Interfer 04/17/2024 01:30 PM    GLUCOSEU Color Interfer 04/17/2024 01:30 PM       ABG  No results found for: \"LPV6IHY\", \"BEART\", \"Q1BKMAQH\", \"PHART\", \"THGBART\", \"BZD4AHQ\", \"PO2ART\", \"UPN5ZIZ\"        Active Hospital Problems    Diagnosis Date Noted    Anemia [D64.9] 06/06/2024           ASSESSMENT/PLAN:  GI bleed- monitor h/h  Cirrhosis -gi consult  Ascites- see orders  Thrombocytopenia  Alcohol abuse- ciwa protocol    Pt is stable.  Discussed with staff  about the plan.  See the orders for further plan.  Will proceed further acc to pt's progress.    Electronically signed by Lolis Mcmanus MD on 6/7/2024 at 2:19 PM

## 2024-06-07 NOTE — PROGRESS NOTES
Pharmacy Medication Reconciliation Note     List of medications patient is currently taking is complete.    Source of information:   1. Conversation with pt at bedside. Wife was helpful with history.  2. OARR  3. Fill hx    Allergies   Allergen Reactions    Codeine        Notes regarding home medications:   1.  Pt is taking Gabapentin. Confirmed fill  2. Was going to [facility] on [address]. Has not been in ~ two weeks due to health issues.   3. Confirmed that he picked up Librium and is taking it.  4. Was not taking Lasix as it makes him short of breath     Akanksha Howard [facility]   6/7/2024  9:41 AM

## 2024-06-07 NOTE — PROGRESS NOTES
Comprehensive Nutrition Assessment    Type and Reason for Visit:  Positive Nutrition Screen    Nutrition Recommendations/Plan:   Monitor for start of diet.   Assess nutritional adequacy.     Malnutrition Assessment:  Malnutrition Status:  Insufficient data (Out of room.) (06/07/24 0909)    Context:  Acute Illness       Nutrition Assessment:    MST=3. Pt. admitted for anemia. Presented to ED with abdominal pain and Hx. of cirrhosis. Currently NPO and out of room for EGD. Noted plan for paracentesis with fluid studies. Pt. received 1 unit of PRBC today for low H&H. Weight Hx. is confirming weight loss. Unable to assess significance at this time. No recommendations at this time. Will monitor for start of diet and follow up for nutrition focused physical exam.    Nutrition Related Findings:    K 3.1, BUN 4, Cr <0.5, Ca 8.2. LBM PTA. Wound Type: None       Current Nutrition Intake & Therapies:    Average Meal Intake: NPO  Average Supplements Intake: NPO  Diet NPO    Anthropometric Measures:  Height: 175.3 cm (5' 9.02\")  Ideal Body Weight (IBW): 160 lbs (73 kg)       Current Body Weight: 90.5 kg (199 lb 8.3 oz), 124.7 % IBW.    Current BMI (kg/m2): 29.4                          BMI Categories: Overweight (BMI 25.0-29.9)    Estimated Daily Nutrient Needs:  Energy Requirements Based On: Kcal/kg  Weight Used for Energy Requirements: Current  Energy (kcal/day): 5123-1979 kcal (20-25 kcal/kg)  Weight Used for Protein Requirements: Current  Protein (g/day): 109-136 g (1.2-1.5g/kg)  Method Used for Fluid Requirements: 1 ml/kcal  Fluid (ml/day): 240    Nutrition Diagnosis:   Inadequate protein-energy intake related to impaired nutrient utilization as evidenced by poor intake prior to admission, other (comment) (ascites, cirrhosis.)    Nutrition Interventions:   Food and/or Nutrient Delivery: Continue NPO  Nutrition Education/Counseling: Education not indicated  Coordination of Nutrition Care: Continue to monitor while inpatient        Goals:     Goals: Initiate PO diet, by next RD assessment       Nutrition Monitoring and Evaluation:   Behavioral-Environmental Outcomes: Readiness for Change  Food/Nutrient Intake Outcomes: Diet Advancement/Tolerance  Physical Signs/Symptoms Outcomes: Biochemical Data, GI Status, Meal Time Behavior    Discharge Planning:    Too soon to determine     Deanne Guadarrama RD  Contact: [phone]

## 2024-06-07 NOTE — PROGRESS NOTES
Patient arrived to room 3102 from [facility]. Patient is A/Ox4. Oriented patient to room and call light. Instructed patient to call for help out of bed, patient verbalized understanding. Patient denies any further needs at this time.    Pt wife Priyanka at bedside. Pt still appears intoxicated. Pt refusing full head to toe assessment at this time.     Heart, lung and bowel sounds assessed- all WDL.

## 2024-06-07 NOTE — PROGRESS NOTES
Patient back up from IR. Patient is aaox4 but very drowsy. Vitals obtained by pca.   Electronically signed by DANIELA RUELAS RN on 6/7/2024 at 2:27 PM'

## 2024-06-07 NOTE — ANESTHESIA POSTPROCEDURE EVALUATION
Department of Anesthesiology  Postprocedure Note    Patient: Rashad Looney  MRN: [id number]  YOB: 1985  Date of evaluation: 6/7/2024    Procedure Summary       Date: 06/07/24 Room / Location: [place] [address] / [facility]    Anesthesia Start: 1222 Anesthesia Stop: 1246    Procedure: ESOPHAGOGASTRODUODENOSCOPY BAND LIGATION Diagnosis:       Anemia, unspecified type      (anemia)    Surgeons: Analia Bourgeois MD Responsible Provider: Edwar Pineda MD    Anesthesia Type: MAC ASA Status: 4            Anesthesia Type: MAC    Wilman Phase I: Wilman Score: 10    Wilman Phase II:      Anesthesia Post Evaluation    Patient location during evaluation: PACU  Patient participation: complete - patient participated  Level of consciousness: awake, combative and confused (Initial mild confusion/combativeness resolved)  Airway patency: patent  Nausea & Vomiting: no nausea and no vomiting  Cardiovascular status: hemodynamically stable and blood pressure returned to baseline  Respiratory status: spontaneous ventilation and nonlabored ventilation  Hydration status: stable  Comments: Plan to proceed to IR for paracentesis before return to floor.   Pain management: adequate    No notable events documented.

## 2024-06-07 NOTE — ANESTHESIA PRE PROCEDURE
anesthetic complications:   Airway: Mallampati: II  TM distance: >3 FB     Mouth opening: > = 3 FB   Dental:      Comment: Denied loose teeth. Denied any bleeding gums or abnormal bleeding/bruising    Pulmonary:   (+)           asthma: current smoker (complete smoking cessation strongly encouraged)    (-) wheezes and rales                          PE comment: Reports mild dyspnea. Likely mechanical, restricted lung capacity 2/2 abdominal ascites. Cardiovascular:  Exercise tolerance: good (>4 METS)  (+) peripheral edema    (-) past MI, orthopnea,  OCONNELL and weak pulsesCHF: lasix.        Rate: normal                    Neuro/Psych:   (+) psychiatric history (History of polysubstance use, EtOH abuse):depression/anxiety    (-) seizures and CVA           GI/Hepatic/Renal:   (+) liver disease (cirrhosis): esophageal varices     (-) no renal disease, bowel prep and no morbid obesity      ROS comment: + History of GI bleed, no obvious active bleed at this time.    Denies any nausea, hematemesis or hematochezia.    CT Abdomen / Pelvis  1. Cirrhotic liver with large volume of ascites.  2. Stable splenomegaly.  3. Cholelithiasis.  4. Small right pleural effusion.  .   Endo/Other:    (+) blood dyscrasia (pancytopenia, suspect 2/2 liver failure, no obvious active bleeding; transfusing first unit of pRBC; INR: 1.84): anemia and thrombocytopenia:..    (-) diabetes mellitus                ROS comment: History of alcohol abuse, alcohol intake reduced since hospitalization in December, 2023 per patient and significant other at bedside.     Remote history of IV drug / heroin use. (>7 years ago) Abdominal:         (-) obese        PE comment: Prominent ascites, scheduled for paracentesis this afternoon at 14:00.   Vascular:          Other Findings: 1u pRBC hanging (2 units charted, first unit had issue with bag and was not given per RN bedside)    Ill-appearance. Anasarca consistent with liver failure. Pleasant.            Anesthesia

## 2024-06-07 NOTE — PROGRESS NOTES
Patient to PACU from Endo. VSS on room air. Patient blood transfusion finished. Lab called for post transfusion CBC. Patient taken to IR for his Para. This RN directs lab to IR department to draw CBC. Manju Dc RN's take patient to IR for para. This RN calls report to Arturo GUSTAFSON regarding the esophageal banding. All questions answered.

## 2024-06-07 NOTE — PROGRESS NOTES
Blood transfusion started at 10:23. This RN remained with patient 15 minutes to monitor for any reactions. Patient tolerating blood well.   Electronically signed by DANIELA RUELAS RN on 6/7/2024 at 10:39 AM

## 2024-06-07 NOTE — PROGRESS NOTES
Alert and oriented. Agreeable to procedure. Gross ascites. Diff to breathe at times. PRBC transfusing free of reaction.

## 2024-06-07 NOTE — OP NOTE
Endoscopy Note    Patient: Rashad Looney  : 1985  Acct#:     Procedure: Esophagogastroduodenoscopy with control of bleeding, variceal band ligation                         Date:  2024     Surgeon:   Analia Bourgeois MD    Referring Physician:  Lolis Mcmanus MD    Indications: This is a 39 y.o. year old male who presents today with  anemia .      The patient has a known history of hepatitis C and alcoholic cirrhosis, which is decompensated by ascites, and history of NBEV.   He has previously had prophylactic band ligation 2023. He had follow up EGD 2024 with scarring but no large varices.     Postoperative Diagnosis:    - Medium sized esophageal varices x 3. Three bands are placed with proximal decompression of varices.   - Moderate portal gastropathy   - No gastric varices   - Normal duodenum     Anesthesia:  Administered by the anesthesia service during MAC     Consent:  The patient or their legal guardian has signed an informed consent, and is aware of the potential risks, benefits, alternatives, and potential complications of this procedure.  These include, but are not limited to hemorrhage, bleeding, post procedural pain, perforation, phlebitis, aspiration, hypotension, hypoxia, cardiovascular events such as arryhthmia, and possibly death.     Description of Procedure: The patient was then taken to the endoscopy suite, placed in the left lateral decubitus position and the above IV sedation was administrered.    The Olympus video endoscope was placed through the patient's oropharynx without difficulty to the extent of the 2nd portion of the duodenum.  Both forward and retroflexed views of the stomach were obtained.      Findings:    Esophagus: Medium sized esophageal varices x 3. Three bands are placed with proximal decompression of varices.     The Z line was located at 40 cm, the GE junction at 40 cm, and the hiatus at 40 cm.     Stomach: Moderate portal gastropathy      Duodenum: The first and 2nd portions of the duodenum appeared normal with normal villous pattern    The scope was then withdrawn back into the stomach, it was decompressed, and the scope was completely withdrawn.    The patient tolerated the procedure well and was taken to the post anesthesia care unit in good condition.    Estimated blood loss: None     * No specimens in log *      Impression:   1) See post procedure diagnoses    Recommendations:   Return to floor   OK for clear liquids   Continue IV Protonix BID   Check iron studies, give IV iron   Continue IV Levaquin for sbp ppx   To have paracentesis this afternoon     Analia Bourgeois MD  [facility]  [phone]

## 2024-06-07 NOTE — PROGRESS NOTES
MD Mcmanus on call office called to notify of pt hemoglobin 6.3, hematocrit 19.0. Awaiting response.     This RN spoke to MD Mcmanus via telephone and received order for 1unit PRBC.

## 2024-06-07 NOTE — ED NOTES
Report given to Kriss KrishnaW RN. She verbalized understanding of patient condition and has no further questions.

## 2024-06-07 NOTE — CONSULTS
GASTROENTEROLOGY INPATIENT CONSULTATION        IDENTIFYING DATA/REASON FOR CONSULTATION   PATIENT:  Rashad Looney  MRN:  [id number]  ADMIT DATE: 6/6/2024  TIME OF EVALUATION: 6/7/2024 7:19 AM  HOSPITAL STAY:   LOS: 1 day     REASON FOR CONSULTATION:  anemia    HISTORY OF PRESENT ILLNESS   Rashad Looney is a 39 y.o. male with a PMH of HCV/ETOH cirrhosis decompensated by nonbleeding esophageal varices, prior ETOH induced pancreatitis and ETOH hepatitis who presented on 6/6/2024 with abdominal pain and distention. CT showed large volume ascites.  Also found to have a hgb of 6.3.  PT denies any visible bleeding.  He continues to drink alcohol daily.  He reports overall feeling \"rough\"    Prior Endoscopic Evaluations:  EGD 1/23/24 with Dr. Spears   1.The distal esophagus had trace grade 1 varices with several banding ulcers in the distal esophagus. No evidence of active variceal bleeding.      EGD 12/26/23 with Dr. Farmer  1) There were 2 columns of medium Grade II esophageal varices without bleeding stigmata were noted in the distal esophagus.  Using a Cook multiband ligator, 3 esophageal bands were successfully deployed.  No immediate complications were noted during or after deployment of the bands.  The gastroesophageal junction was at 43 cm from the incisors.   2) There were no gastric varices noted.    3) There was moderate portal gastropathy noted.    4) There was a normal appearing duodenal bulb and second portion of the duodenum.       PAST MEDICAL, SURGICAL, FAMILY, and SOCIAL HISTORY     Past Medical History:   Diagnosis Date    Alcohol use disorder     Chronic liver disease     Class 1 obesity     Esophageal varices (HCC)     Tobacco use disorder      Past Surgical History:   Procedure Laterality Date    UPPER GASTROINTESTINAL ENDOSCOPY N/A 12/26/2023    EGD ESOPHAGOGASTRODUODENOSCOPY ENDOSCOPIC VARICEAL TREATMENT W/ BANDING performed by Jitendra Farmer Jr., DO at [facility] ENDOSCOPY    UPPER

## 2024-06-07 NOTE — PROGRESS NOTES
Patient taken off unit to preop.  Electronically signed by DANIELA RUELAS RN on 6/7/2024 at 10:42 AM

## 2024-06-08 ENCOUNTER — APPOINTMENT (OUTPATIENT)
Age: 39
DRG: 280 | End: 2024-06-08
Payer: COMMERCIAL

## 2024-06-08 VITALS
HEIGHT: 69 IN | DIASTOLIC BLOOD PRESSURE: 76 MMHG | RESPIRATION RATE: 18 BRPM | SYSTOLIC BLOOD PRESSURE: 131 MMHG | OXYGEN SATURATION: 94 % | HEART RATE: 89 BPM | BODY MASS INDEX: 28.73 KG/M2 | TEMPERATURE: 98.1 F | WEIGHT: 194 LBS

## 2024-06-08 LAB
ALBUMIN SERPL-MCNC: 3.1 G/DL (ref 3.4–5)
ALBUMIN/GLOB SERPL: 1 {RATIO} (ref 1.1–2.2)
ALP SERPL-CCNC: 81 U/L (ref 40–129)
ALT SERPL-CCNC: 24 U/L (ref 10–40)
AMMONIA PLAS-SCNC: 22 UMOL/L (ref 16–60)
ANION GAP SERPL CALCULATED.3IONS-SCNC: 7 MMOL/L (ref 3–16)
AST SERPL-CCNC: 61 U/L (ref 15–37)
BILIRUB SERPL-MCNC: 7.8 MG/DL (ref 0–1)
BUN SERPL-MCNC: 8 MG/DL (ref 7–20)
CALCIUM SERPL-MCNC: 8.2 MG/DL (ref 8.3–10.6)
CHLORIDE SERPL-SCNC: 102 MMOL/L (ref 99–110)
CO2 SERPL-SCNC: 25 MMOL/L (ref 21–32)
CREAT SERPL-MCNC: <0.5 MG/DL (ref 0.9–1.3)
ECHO AO ASC DIAM: 3 CM
ECHO AO ASCENDING AORTA INDEX: 1.47 CM/M2
ECHO AO ROOT DIAM: 3.2 CM
ECHO AO ROOT INDEX: 1.57 CM/M2
ECHO AV AREA PEAK VELOCITY: 2 CM2
ECHO AV AREA VTI: 2 CM2
ECHO AV AREA/BSA PEAK VELOCITY: 1 CM2/M2
ECHO AV AREA/BSA VTI: 1 CM2/M2
ECHO AV MEAN GRADIENT: 9 MMHG
ECHO AV MEAN VELOCITY: 1.4 M/S
ECHO AV PEAK GRADIENT: 13 MMHG
ECHO AV PEAK VELOCITY: 1.8 M/S
ECHO AV VELOCITY RATIO: 0.61
ECHO AV VTI: 40.1 CM
ECHO BSA: 2.07 M2
ECHO IVC EXP: 2 CM
ECHO LA AREA 2C: 22.3 CM2
ECHO LA AREA 4C: 21.3 CM2
ECHO LA MAJOR AXIS: 5.6 CM
ECHO LA MINOR AXIS: 5.7 CM
ECHO LA VOL BP: 70 ML (ref 18–58)
ECHO LA VOL MOD A2C: 72 ML (ref 18–58)
ECHO LA VOL MOD A4C: 66 ML (ref 18–58)
ECHO LA VOL/BSA BIPLANE: 34 ML/M2 (ref 16–34)
ECHO LA VOLUME INDEX MOD A2C: 35 ML/M2 (ref 16–34)
ECHO LA VOLUME INDEX MOD A4C: 32 ML/M2 (ref 16–34)
ECHO LV E' LATERAL VELOCITY: 13 CM/S
ECHO LV E' SEPTAL VELOCITY: 10 CM/S
ECHO LV FRACTIONAL SHORTENING: 26 % (ref 28–44)
ECHO LV INTERNAL DIMENSION DIASTOLE INDEX: 2.65 CM/M2
ECHO LV INTERNAL DIMENSION DIASTOLIC: 5.4 CM (ref 4.2–5.9)
ECHO LV INTERNAL DIMENSION SYSTOLIC INDEX: 1.96 CM/M2
ECHO LV INTERNAL DIMENSION SYSTOLIC: 4 CM
ECHO LV IVSD: 0.9 CM (ref 0.6–1)
ECHO LV MASS 2D: 193.3 G (ref 88–224)
ECHO LV MASS INDEX 2D: 94.7 G/M2 (ref 49–115)
ECHO LV POSTERIOR WALL DIASTOLIC: 1 CM (ref 0.6–1)
ECHO LV RELATIVE WALL THICKNESS RATIO: 0.37
ECHO LVOT AREA: 3.5 CM2
ECHO LVOT AV VTI INDEX: 0.58
ECHO LVOT DIAM: 2.1 CM
ECHO LVOT MEAN GRADIENT: 3 MMHG
ECHO LVOT PEAK GRADIENT: 5 MMHG
ECHO LVOT PEAK VELOCITY: 1.1 M/S
ECHO LVOT STROKE VOLUME INDEX: 39.4 ML/M2
ECHO LVOT SV: 80.3 ML
ECHO LVOT VTI: 23.2 CM
ECHO MV A VELOCITY: 0.97 M/S
ECHO MV AREA VTI: 3.1 CM2
ECHO MV E DECELERATION TIME (DT): 212 MS
ECHO MV E VELOCITY: 1.13 M/S
ECHO MV E/A RATIO: 1.16
ECHO MV E/E' LATERAL: 8.69
ECHO MV E/E' RATIO (AVERAGED): 10
ECHO MV E/E' SEPTAL: 11.3
ECHO MV LVOT VTI INDEX: 1.1
ECHO MV MAX VELOCITY: 1.3 M/S
ECHO MV MEAN GRADIENT: 4 MMHG
ECHO MV MEAN VELOCITY: 1 M/S
ECHO MV PEAK GRADIENT: 6 MMHG
ECHO MV VTI: 25.6 CM
ECHO PV MAX VELOCITY: 1.2 M/S
ECHO PV PEAK GRADIENT: 6 MMHG
ECHO RA AREA 4C: 15.1 CM2
ECHO RA END SYSTOLIC VOLUME APICAL 4 CHAMBER INDEX BSA: 18 ML/M2
ECHO RA VOLUME: 37 ML
ECHO RV BASAL DIMENSION: 3.3 CM
ECHO RV FREE WALL PEAK S': 16 CM/S
ECHO RV MID DIMENSION: 2.2 CM
ECHO RV TAPSE: 2.8 CM (ref 1.7–?)
GFR SERPLBLD CREATININE-BSD FMLA CKD-EPI: >90 ML/MIN/{1.73_M2}
GLUCOSE SERPL-MCNC: 87 MG/DL (ref 70–99)
HCT VFR BLD AUTO: 23.3 % (ref 40.5–52.5)
HGB BLD-MCNC: 7.9 G/DL (ref 13.5–17.5)
POTASSIUM SERPL-SCNC: 4.2 MMOL/L (ref 3.5–5.1)
PROT SERPL-MCNC: 6.1 G/DL (ref 6.4–8.2)
SODIUM SERPL-SCNC: 134 MMOL/L (ref 136–145)

## 2024-06-08 PROCEDURE — 93306 TTE W/DOPPLER COMPLETE: CPT

## 2024-06-08 PROCEDURE — 36415 COLL VENOUS BLD VENIPUNCTURE: CPT

## 2024-06-08 PROCEDURE — 85018 HEMOGLOBIN: CPT

## 2024-06-08 PROCEDURE — 80053 COMPREHEN METABOLIC PANEL: CPT

## 2024-06-08 PROCEDURE — 6360000002 HC RX W HCPCS: Performed by: INTERNAL MEDICINE

## 2024-06-08 PROCEDURE — 30233N1 TRANSFUSION OF NONAUTOLOGOUS RED BLOOD CELLS INTO PERIPHERAL VEIN, PERCUTANEOUS APPROACH: ICD-10-PCS | Performed by: INTERNAL MEDICINE

## 2024-06-08 PROCEDURE — 82140 ASSAY OF AMMONIA: CPT

## 2024-06-08 PROCEDURE — 85014 HEMATOCRIT: CPT

## 2024-06-08 RX ADMIN — LEVOFLOXACIN 500 MG: 5 INJECTION, SOLUTION INTRAVENOUS at 02:01

## 2024-06-08 NOTE — PROGRESS NOTES
Department of Internal Medicine  General Internal Medicine  Attending Progress Note      SUBJECTIVE:  pt is sleepy, but arousable, wife at bedside,Echo with bubble studies done at bedside.pt is chronically ill-looking, No n/vo/ would like to advance diet to full liquid. Pt is afebrile, Pt would like to sign out AMA, he feels the nursing staff not helping him enough, nicotine patch, and meds not given on marixa. Try to convice him to stay but he is adamant to leave, wife at bedside, She can't argue with him.    OBJECTIVE      Medications    Current Facility-Administered Medications: buprenorphine-naloxone (SUBOXONE) 8-2 MG SL tablet 1 tablet, 1 tablet, SubLINGual, BID  mometasone-formoterol (DULERA) 100-5 MCG/ACT inhaler 2 puff, 2 puff, Inhalation, BID  pantoprazole (PROTONIX) 40 mg in sodium chloride (PF) 0.9 % 10 mL injection, 40 mg, IntraVENous, Daily  sodium chloride flush 0.9 % injection 5-40 mL, 5-40 mL, IntraVENous, 2 times per day  sodium chloride flush 0.9 % injection 5-40 mL, 5-40 mL, IntraVENous, PRN  0.9 % sodium chloride infusion, , IntraVENous, PRN  thiamine mononitrate tablet 100 mg, 100 mg, Oral, Daily  LORazepam (ATIVAN) tablet 1 mg, 1 mg, Oral, Q1H PRN **OR** LORazepam (ATIVAN) injection 1 mg, 1 mg, IntraVENous, Q1H PRN **OR** LORazepam (ATIVAN) tablet 2 mg, 2 mg, Oral, Q1H PRN **OR** LORazepam (ATIVAN) injection 2 mg, 2 mg, IntraVENous, Q1H PRN **OR** LORazepam (ATIVAN) tablet 3 mg, 3 mg, Oral, Q1H PRN **OR** LORazepam (ATIVAN) injection 3 mg, 3 mg, IntraVENous, Q1H PRN **OR** LORazepam (ATIVAN) tablet 4 mg, 4 mg, Oral, Q1H PRN **OR** LORazepam (ATIVAN) injection 4 mg, 4 mg, IntraVENous, Q1H PRN  lactulose (CHRONULAC) 10 GM/15ML solution 20 g, 20 g, Oral, TID  levoFLOXacin (LEVAQUIN) 500 MG/100ML infusion 500 mg, 500 mg, IntraVENous, Q24H  0.9 % sodium chloride infusion, , IntraVENous, PRN  0.9 % sodium chloride infusion, , IntraVENous, PRN  naloxone 0.4 mg in 10 mL sodium chloride syringe, ,

## 2024-06-08 NOTE — PLAN OF CARE
Problem: Discharge Planning  Goal: Discharge to home or other facility with appropriate resources  Outcome: Progressing     Problem: Pain  Goal: Verbalizes/displays adequate comfort level or baseline comfort level  Outcome: Progressing     Problem: Safety - Adult  Goal: Free from fall injury  Outcome: Progressing  Flowsheets (Taken 6/7/2024 2350)  Free From Fall Injury: Instruct family/caregiver on patient safety     Problem: ABCDS Injury Assessment  Goal: Absence of physical injury  Outcome: Progressing  Flowsheets (Taken 6/7/2024 2350)  Absence of Physical Injury: Implement safety measures based on patient assessment     Problem: Nutrition Deficit:  Goal: Optimize nutritional status  Outcome: Progressing     Problem: Skin/Tissue Integrity  Goal: Absence of new skin breakdown  Description: 1.  Monitor for areas of redness and/or skin breakdown  2.  Assess vascular access sites hourly  3.  Every 4-6 hours minimum:  Change oxygen saturation probe site  4.  Every 4-6 hours:  If on nasal continuous positive airway pressure, respiratory therapy assess nares and determine need for appliance change or resting period.  Outcome: Progressing
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
## 2024-06-08 NOTE — PROGRESS NOTES
Pt insisted on leaving AMA wife at bedside. Dr. Mayes attempted to speak with pt at bedside to stay for admission pt still insisted on leaving. AMA paperwork reviewed with pt, pt verbalized of understanding paperwork signed. Both IV removed with no complications, pt tolerated removal well. Pt ambulated off unit. Electronically signed by Leisa Chadwick RN on 6/8/2024 at 9:37 AM

## 2024-06-08 NOTE — PROGRESS NOTES
Patient in bed resting in bed at this time.Patient is  alert and oriented x 4. Pt mumbles words. Able to make all needs known. PT request ativan but did not score on ciwa.He has no s/s of withdrawal. Wife in bed with him.   Assessment completed. VS WNL.. IV capped and flushed. Fall precautions in place. Call light within reach.

## 2024-06-08 NOTE — PLAN OF CARE
Problem: Discharge Planning  Goal: Discharge to home or other facility with appropriate resources  6/8/2024 0718 by Leisa Chadwick RN  Outcome: Progressing  Flowsheets (Taken 6/8/2024 0718)  Discharge to home or other facility with appropriate resources:   Identify barriers to discharge with patient and caregiver   Arrange for needed discharge resources and transportation as appropriate   Identify discharge learning needs (meds, wound care, etc)   Refer to discharge planning if patient needs post-hospital services based on physician order or complex needs related to functional status, cognitive ability or social support system  6/7/2024 2352 by Maylin Gamino RN  Outcome: Progressing     Problem: Pain  Goal: Verbalizes/displays adequate comfort level or baseline comfort level  6/8/2024 0718 by Leisa Chadwick RN  Outcome: Progressing  Flowsheets (Taken 6/8/2024 0718)  Verbalizes/displays adequate comfort level or baseline comfort level:   Encourage patient to monitor pain and request assistance   Assess pain using appropriate pain scale   Administer analgesics based on type and severity of pain and evaluate response   Implement non-pharmacological measures as appropriate and evaluate response   Consider cultural and social influences on pain and pain management   Notify Licensed Independent Practitioner if interventions unsuccessful or patient reports new pain  6/7/2024 2352 by Maylin Gamino RN  Outcome: Progressing     Problem: Safety - Adult  Goal: Free from fall injury  6/8/2024 0718 by Leisa Chadwick RN  Outcome: Progressing  Flowsheets (Taken 6/8/2024 0718)  Free From Fall Injury: Instruct family/caregiver on patient safety  6/7/2024 2352 by Maylin Gamino RN  Outcome: Progressing  Flowsheets (Taken 6/7/2024 2350)  Free From Fall Injury: Instruct family/caregiver on patient safety     Problem: ABCDS Injury Assessment  Goal: Absence of physical injury  6/8/2024 0718 by Leisa Chadwick RN  Outcome:

## 2024-06-09 LAB
BACTERIA FLD AEROBE CULT: NORMAL
GRAM STN SPEC: NORMAL

## 2024-06-10 LAB
BACTERIA FLD AEROBE CULT: NORMAL
GRAM STN SPEC: NORMAL

## 2024-08-01 ENCOUNTER — HOSPITAL ENCOUNTER (OUTPATIENT)
Dept: ENDOSCOPY | Age: 39
Setting detail: OUTPATIENT SURGERY
Discharge: HOME | End: 2024-08-01
Attending: INTERNAL MEDICINE

## 2024-08-11 ENCOUNTER — HOSPITAL ENCOUNTER (EMERGENCY)
Age: 39
Discharge: LEFT BEFORE DISCHARGE | End: 2024-08-11
Payer: COMMERCIAL

## 2024-08-11 ENCOUNTER — APPOINTMENT (OUTPATIENT)
Dept: GENERAL RADIOLOGY | Age: 39
End: 2024-08-11
Payer: COMMERCIAL

## 2024-08-11 ENCOUNTER — APPOINTMENT (OUTPATIENT)
Dept: CT IMAGING | Age: 39
End: 2024-08-11
Payer: COMMERCIAL

## 2024-08-11 ENCOUNTER — HOSPITAL ENCOUNTER (EMERGENCY)
Age: 39
Discharge: LEFT BEFORE DISCHARGE | End: 2024-08-11
Attending: EMERGENCY MEDICINE
Payer: COMMERCIAL

## 2024-08-11 VITALS
WEIGHT: 184.75 LBS | OXYGEN SATURATION: 99 % | RESPIRATION RATE: 14 BRPM | TEMPERATURE: 98.4 F | HEART RATE: 107 BPM | SYSTOLIC BLOOD PRESSURE: 95 MMHG | BODY MASS INDEX: 27.36 KG/M2 | HEIGHT: 69 IN | DIASTOLIC BLOOD PRESSURE: 73 MMHG

## 2024-08-11 VITALS
HEART RATE: 104 BPM | BODY MASS INDEX: 27.36 KG/M2 | DIASTOLIC BLOOD PRESSURE: 87 MMHG | WEIGHT: 184.75 LBS | HEIGHT: 69 IN | OXYGEN SATURATION: 99 % | SYSTOLIC BLOOD PRESSURE: 128 MMHG | TEMPERATURE: 98 F | RESPIRATION RATE: 16 BRPM

## 2024-08-11 DIAGNOSIS — K72.10 END STAGE LIVER DISEASE (HCC): Primary | ICD-10-CM

## 2024-08-11 DIAGNOSIS — F10.939 ALCOHOL WITHDRAWAL SYNDROME WITH COMPLICATION (HCC): ICD-10-CM

## 2024-08-11 DIAGNOSIS — K70.31 ALCOHOLIC CIRRHOSIS OF LIVER WITH ASCITES (HCC): Primary | ICD-10-CM

## 2024-08-11 DIAGNOSIS — D64.9 ANEMIA, UNSPECIFIED TYPE: ICD-10-CM

## 2024-08-11 LAB
ALBUMIN SERPL-MCNC: 2.7 G/DL (ref 3.4–5)
ALBUMIN/GLOB SERPL: 0.7 {RATIO} (ref 1.1–2.2)
ALP SERPL-CCNC: 80 U/L (ref 40–129)
ALT SERPL-CCNC: 44 U/L (ref 10–40)
AMMONIA PLAS-SCNC: 21 UMOL/L (ref 16–60)
ANION GAP SERPL CALCULATED.3IONS-SCNC: 10 MMOL/L (ref 3–16)
AST SERPL-CCNC: 132 U/L (ref 15–37)
BASOPHILS # BLD: 0 K/UL (ref 0–0.2)
BASOPHILS NFR BLD: 0.8 %
BILIRUB SERPL-MCNC: 19 MG/DL (ref 0–1)
BUN SERPL-MCNC: 16 MG/DL (ref 7–20)
CALCIUM SERPL-MCNC: 9.1 MG/DL (ref 8.3–10.6)
CHLORIDE SERPL-SCNC: 95 MMOL/L (ref 99–110)
CO2 SERPL-SCNC: 27 MMOL/L (ref 21–32)
CREAT SERPL-MCNC: <0.5 MG/DL (ref 0.9–1.3)
DEPRECATED RDW RBC AUTO: 17.5 % (ref 12.4–15.4)
EOSINOPHIL # BLD: 0 K/UL (ref 0–0.6)
EOSINOPHIL NFR BLD: 0.6 %
GFR SERPLBLD CREATININE-BSD FMLA CKD-EPI: >90 ML/MIN/{1.73_M2}
GLUCOSE SERPL-MCNC: 103 MG/DL (ref 70–99)
HCT VFR BLD AUTO: 22.6 % (ref 40.5–52.5)
HGB BLD-MCNC: 7.9 G/DL (ref 13.5–17.5)
LACTATE BLDV-SCNC: 2.7 MMOL/L (ref 0.4–2)
LYMPHOCYTES # BLD: 0.7 K/UL (ref 1–5.1)
LYMPHOCYTES NFR BLD: 15 %
MCH RBC QN AUTO: 38.3 PG (ref 26–34)
MCHC RBC AUTO-ENTMCNC: 34.9 G/DL (ref 31–36)
MCV RBC AUTO: 109.6 FL (ref 80–100)
MONOCYTES # BLD: 0.7 K/UL (ref 0–1.3)
MONOCYTES NFR BLD: 15.6 %
NEUTROPHILS # BLD: 3.3 K/UL (ref 1.7–7.7)
NEUTROPHILS NFR BLD: 68 %
PLATELET # BLD AUTO: 55 K/UL (ref 135–450)
PMV BLD AUTO: 10 FL (ref 5–10.5)
POTASSIUM SERPL-SCNC: 3.8 MMOL/L (ref 3.5–5.1)
PROT SERPL-MCNC: 6.7 G/DL (ref 6.4–8.2)
RBC # BLD AUTO: 2.06 M/UL (ref 4.2–5.9)
SODIUM SERPL-SCNC: 132 MMOL/L (ref 136–145)
WBC # BLD AUTO: 4.8 K/UL (ref 4–11)

## 2024-08-11 PROCEDURE — 83605 ASSAY OF LACTIC ACID: CPT

## 2024-08-11 PROCEDURE — 6370000000 HC RX 637 (ALT 250 FOR IP): Performed by: PHYSICIAN ASSISTANT

## 2024-08-11 PROCEDURE — 96365 THER/PROPH/DIAG IV INF INIT: CPT

## 2024-08-11 PROCEDURE — 6360000004 HC RX CONTRAST MEDICATION: Performed by: PHYSICIAN ASSISTANT

## 2024-08-11 PROCEDURE — 74177 CT ABD & PELVIS W/CONTRAST: CPT

## 2024-08-11 PROCEDURE — 80053 COMPREHEN METABOLIC PANEL: CPT

## 2024-08-11 PROCEDURE — 85025 COMPLETE CBC W/AUTO DIFF WBC: CPT

## 2024-08-11 PROCEDURE — 87040 BLOOD CULTURE FOR BACTERIA: CPT

## 2024-08-11 PROCEDURE — 71046 X-RAY EXAM CHEST 2 VIEWS: CPT

## 2024-08-11 PROCEDURE — 2580000003 HC RX 258: Performed by: PHYSICIAN ASSISTANT

## 2024-08-11 PROCEDURE — 87150 DNA/RNA AMPLIFIED PROBE: CPT

## 2024-08-11 PROCEDURE — 99285 EMERGENCY DEPT VISIT HI MDM: CPT

## 2024-08-11 PROCEDURE — 82140 ASSAY OF AMMONIA: CPT

## 2024-08-11 PROCEDURE — 99283 EMERGENCY DEPT VISIT LOW MDM: CPT

## 2024-08-11 PROCEDURE — 6360000002 HC RX W HCPCS: Performed by: PHYSICIAN ASSISTANT

## 2024-08-11 RX ORDER — GAUZE BANDAGE 2" X 2"
100 BANDAGE TOPICAL DAILY
Status: DISCONTINUED | OUTPATIENT
Start: 2024-08-11 | End: 2024-08-11 | Stop reason: HOSPADM

## 2024-08-11 RX ORDER — DIAZEPAM 5 MG/1
5 TABLET ORAL 3 TIMES DAILY
Status: DISCONTINUED | OUTPATIENT
Start: 2024-08-11 | End: 2024-08-11 | Stop reason: HOSPADM

## 2024-08-11 RX ORDER — FOLIC ACID 1 MG/1
1 TABLET ORAL DAILY
Status: DISCONTINUED | OUTPATIENT
Start: 2024-08-11 | End: 2024-08-11 | Stop reason: HOSPADM

## 2024-08-11 RX ORDER — SODIUM CHLORIDE 9 MG/ML
INJECTION, SOLUTION INTRAVENOUS PRN
Status: DISCONTINUED | OUTPATIENT
Start: 2024-08-11 | End: 2024-08-11 | Stop reason: HOSPADM

## 2024-08-11 RX ORDER — MULTIVITAMIN WITH IRON
1 TABLET ORAL DAILY
Status: DISCONTINUED | OUTPATIENT
Start: 2024-08-11 | End: 2024-08-11 | Stop reason: HOSPADM

## 2024-08-11 RX ORDER — SODIUM CHLORIDE 0.9 % (FLUSH) 0.9 %
5-40 SYRINGE (ML) INJECTION PRN
Status: DISCONTINUED | OUTPATIENT
Start: 2024-08-11 | End: 2024-08-11 | Stop reason: HOSPADM

## 2024-08-11 RX ORDER — DIAZEPAM 5 MG/1
10 TABLET ORAL ONCE
Status: COMPLETED | OUTPATIENT
Start: 2024-08-11 | End: 2024-08-11

## 2024-08-11 RX ORDER — SODIUM CHLORIDE 0.9 % (FLUSH) 0.9 %
5-40 SYRINGE (ML) INJECTION EVERY 12 HOURS SCHEDULED
Status: DISCONTINUED | OUTPATIENT
Start: 2024-08-11 | End: 2024-08-11 | Stop reason: HOSPADM

## 2024-08-11 RX ADMIN — IOPAMIDOL 75 ML: 755 INJECTION, SOLUTION INTRAVENOUS at 11:37

## 2024-08-11 RX ADMIN — FOLIC ACID 1 MG: 1 TABLET ORAL at 11:03

## 2024-08-11 RX ADMIN — CEFTRIAXONE SODIUM 2000 MG: 2 INJECTION, POWDER, FOR SOLUTION INTRAMUSCULAR; INTRAVENOUS at 11:46

## 2024-08-11 RX ADMIN — DIAZEPAM 10 MG: 5 TABLET ORAL at 11:02

## 2024-08-11 RX ADMIN — Medication 100 MG: at 11:02

## 2024-08-11 RX ADMIN — THERA TABS 1 TABLET: TAB at 11:03

## 2024-08-11 ASSESSMENT — PAIN DESCRIPTION - LOCATION
LOCATION: ABDOMEN

## 2024-08-11 ASSESSMENT — PAIN - FUNCTIONAL ASSESSMENT
PAIN_FUNCTIONAL_ASSESSMENT: 0-10
PAIN_FUNCTIONAL_ASSESSMENT: PREVENTS OR INTERFERES SOME ACTIVE ACTIVITIES AND ADLS
PAIN_FUNCTIONAL_ASSESSMENT: 0-10
PAIN_FUNCTIONAL_ASSESSMENT: 0-10

## 2024-08-11 ASSESSMENT — PAIN DESCRIPTION - PAIN TYPE
TYPE: ACUTE PAIN
TYPE: CHRONIC PAIN

## 2024-08-11 ASSESSMENT — PAIN DESCRIPTION - DESCRIPTORS
DESCRIPTORS: DISCOMFORT
DESCRIPTORS: ACHING

## 2024-08-11 ASSESSMENT — PAIN SCALES - GENERAL
PAINLEVEL_OUTOF10: 7

## 2024-08-11 ASSESSMENT — PAIN DESCRIPTION - ONSET: ONSET: ON-GOING

## 2024-08-11 ASSESSMENT — PAIN DESCRIPTION - ORIENTATION: ORIENTATION: LEFT

## 2024-08-11 ASSESSMENT — PAIN DESCRIPTION - FREQUENCY: FREQUENCY: CONTINUOUS

## 2024-08-11 NOTE — ED NOTES
Pt wandering around department looking for the cafeteria to leave the department, pt was encouraged to go back to his room, pt refusing as he continues to walk the department.

## 2024-08-11 NOTE — ED NOTES
Patient presents very jaundice, lethargic, and confused. Patient reportedly has 10 days to a month to live.

## 2024-08-11 NOTE — ED NOTES
Patient to ED with ESLD, AMA from  last night. Eloped from this ED earlier today. Patient very jaundice, intermittent confusion. Significant other and mom at bedside. Patient states he will stay at this time and agreeable for admission.    Patient reports getting paracentesis about once a month, current bandage over left abdomin.

## 2024-08-11 NOTE — ED TRIAGE NOTES
Patient presents to triage after eloping from ED room 45 to smoke. Pt has abdominal pain, confusion, and distention from known liver failure. Pt accompanied by his wife and mother.

## 2024-08-11 NOTE — ED NOTES
Followed up with Rashad Looney on 8/11/2024 at 4:36 PM. Patient left the ED with a disposition of AMA on . Patient cited personal obligations as reason. Advised patient to follow up with a primary care physician or return to the Emergency Department if symptoms worsen.   Leander Vogel RN

## 2024-08-11 NOTE — ED PROVIDER NOTES
[facility] EMERGENCY DEPARTMENT  EMERGENCY DEPARTMENT ENCOUNTER        Pt Name: Rashad Looney  MRN: [id number]  Birthdate 1985  Date of evaluation: 8/11/2024  Provider: SERGIO Urban  PCP: Lolis Mcmanus MD  Note Started: 10:02 AM EDT 8/11/24       I have seen and evaluated this patient with my supervising physician ALYSIA MULLIGAN       CHIEF COMPLAINT       Chief Complaint   Patient presents with    Wound Check     Pt brought in by [facility] from home. Pt wants to get his abdominal wound checked from paracentesis. Pt has end stage liver disease and left AMA last evening from [facility].        HISTORY OF PRESENT ILLNESS: 1 or more Elements     History from : Patient    Limitations to history : None    Rashad Looney is a 39 y.o. male with complex medical history of liver dysfunction with cirrhosis, ascites, need for therapeutic paracentesis, esophageal varices with previous EGDs and band ligation, active drinker, seen a couple days ago at [facility], left AGAINST MEDICAL ADVICE after he was admitted on the ninth for worsening ascites.  Patient started to go through alcohol withdrawal while he was admitted and walked out.  Patient did have blood transfusion while he was admitted.  He had diagnostic paracentesis in the ED without evidence of SBP.  He had a therapeutic paracentesis performed by GI.     Pt notes today he wants his wound checked from his paracentesis. He notes it has been bleeding and soaked throught the bandage but he has not changed the bandage. He has not had a drink since he left the hospital. He notes he feels he is going to die soon and does not want to be admitted to the hospital because he wants to spend time with his loved ones.   He continues to have abdominal pain that is unchanged. He denies fevers, chest pain, syncope. He has baseline SOB.   Nursing Notes were all reviewed and agreed with or any disagreements were addressed

## 2024-08-11 NOTE — ED PROVIDER NOTES
[facility] EMERGENCY DEPARTMENT  [address] [address]  Dept: [phone]  Loc: [phone]    EMERGENCY DEPARTMENT ENCOUNTER        This patient was not seen or evaluated by the attending physician.    I evaluated this patient, the attending physician was available for consultation.    CHIEF COMPLAINT    Chief Complaint   Patient presents with    Abdominal Pain     Pt presents after eloping from ED to go smoke, pt has confusion and abdominal distention due to liver disease per his significant other report.     Altered Mental Status       HPI    Rashad Looney is a 39 y.o. male with history of alcoholic liver cirrhosis who presents with abdominal pain diffusely throughout the abdomen.  Onset was 2 days ago.  The duration has been constant since the onset.  No associated symptoms.  There are no alleviating factors.    Patient was seen in the ED earlier today and eloped.  He returns now with his wife and mother at the bedside.  They state that he has been confused and has had worsening abdominal pain.  He is also withdrawing from alcohol.  They state his last drink was 3 days ago, he was admitted to  last night and received a dose of Ativan but left AMA.  Patient was given dose of Valium earlier today in the ED.    REVIEW OF SYSTEMS    GI: see HPI, no vomiting, no diarrhea  Cardiac: No chest pain, shortness of breath, palpitations or syncope  Pulmonary: No difficulty breathing or new cough  General: No fevers  : No dysuria, No hematuria  See HPI for further details.   All other systems reviewed and are negative.    PAST MEDICAL & SURGICAL HISTORY    Past Medical History:   Diagnosis Date    Alcohol use disorder     Alcoholic cirrhosis of liver with ascites (HCC)     2 L drained june 2024    Anemia     Asthma     Chest tightness     Chronic liver disease     Class 1 obesity     Dyspnea on exertion     sob with any exertion, even talking

## 2024-08-11 NOTE — ED NOTES
This RN waiting on Dr Irby to have EKG signed, another RN came over stated she was chasing this patient around the department and currently walking through ER doors. Elisabeth HILL aware patient eloped no further orders. Ashley boyle RN aware.

## 2024-08-11 NOTE — ED NOTES
Patient called RN, states his dog is barking out in hallway ands the dog brought to him. RN reoriented patient. Patient states he wants to leave to go to his moms for chicken noodle soup. Girlfriend continues at bedside. States mom will be here shortly.

## 2024-08-11 NOTE — ED TRIAGE NOTES
Pt brought in by \[facility]\"" EMS from home. Pt wants to get his abdominal wound checked from paracentesis. Pt has end stage liver disease and left AMA last evening from [facility].

## 2024-08-12 ENCOUNTER — HOSPITAL ENCOUNTER (INPATIENT)
Age: 39
LOS: 2 days | Discharge: LEFT BEFORE DISCHARGE | End: 2024-08-15
Attending: EMERGENCY MEDICINE | Admitting: INTERNAL MEDICINE
Payer: COMMERCIAL

## 2024-08-12 ENCOUNTER — APPOINTMENT (OUTPATIENT)
Dept: GENERAL RADIOLOGY | Age: 39
End: 2024-08-12
Payer: COMMERCIAL

## 2024-08-12 DIAGNOSIS — F10.90 ALCOHOL USE DISORDER: ICD-10-CM

## 2024-08-12 DIAGNOSIS — K74.60 DECOMPENSATION OF CIRRHOSIS OF LIVER (HCC): Primary | ICD-10-CM

## 2024-08-12 DIAGNOSIS — K72.90 DECOMPENSATION OF CIRRHOSIS OF LIVER (HCC): Primary | ICD-10-CM

## 2024-08-12 LAB — REPORT: NORMAL

## 2024-08-12 PROCEDURE — 96365 THER/PROPH/DIAG IV INF INIT: CPT

## 2024-08-12 PROCEDURE — 99285 EMERGENCY DEPT VISIT HI MDM: CPT

## 2024-08-12 PROCEDURE — 85610 PROTHROMBIN TIME: CPT

## 2024-08-12 PROCEDURE — 30233N1 TRANSFUSION OF NONAUTOLOGOUS RED BLOOD CELLS INTO PERIPHERAL VEIN, PERCUTANEOUS APPROACH: ICD-10-PCS | Performed by: INTERNAL MEDICINE

## 2024-08-12 PROCEDURE — 2580000003 HC RX 258: Performed by: EMERGENCY MEDICINE

## 2024-08-12 PROCEDURE — 84145 PROCALCITONIN (PCT): CPT

## 2024-08-12 PROCEDURE — 80053 COMPREHEN METABOLIC PANEL: CPT

## 2024-08-12 PROCEDURE — 6360000002 HC RX W HCPCS: Performed by: EMERGENCY MEDICINE

## 2024-08-12 PROCEDURE — 71045 X-RAY EXAM CHEST 1 VIEW: CPT

## 2024-08-12 PROCEDURE — 83735 ASSAY OF MAGNESIUM: CPT

## 2024-08-12 PROCEDURE — 86901 BLOOD TYPING SEROLOGIC RH(D): CPT

## 2024-08-12 PROCEDURE — 87040 BLOOD CULTURE FOR BACTERIA: CPT

## 2024-08-12 PROCEDURE — 85025 COMPLETE CBC W/AUTO DIFF WBC: CPT

## 2024-08-12 PROCEDURE — P9016 RBC LEUKOCYTES REDUCED: HCPCS

## 2024-08-12 PROCEDURE — 86850 RBC ANTIBODY SCREEN: CPT

## 2024-08-12 PROCEDURE — 82077 ASSAY SPEC XCP UR&BREATH IA: CPT

## 2024-08-12 PROCEDURE — 83605 ASSAY OF LACTIC ACID: CPT

## 2024-08-12 PROCEDURE — 93005 ELECTROCARDIOGRAM TRACING: CPT | Performed by: EMERGENCY MEDICINE

## 2024-08-12 PROCEDURE — 86900 BLOOD TYPING SEROLOGIC ABO: CPT

## 2024-08-12 PROCEDURE — 85730 THROMBOPLASTIN TIME PARTIAL: CPT

## 2024-08-12 PROCEDURE — 86923 COMPATIBILITY TEST ELECTRIC: CPT

## 2024-08-12 RX ORDER — CLINDAMYCIN HYDROCHLORIDE 150 MG/1
450 CAPSULE ORAL 3 TIMES DAILY
Qty: 63 CAPSULE | Refills: 0 | Status: SHIPPED | OUTPATIENT
Start: 2024-08-12 | End: 2024-08-19

## 2024-08-12 RX ADMIN — CEFEPIME 2000 MG: 2 INJECTION, POWDER, FOR SOLUTION INTRAVENOUS at 23:54

## 2024-08-12 ASSESSMENT — LIFESTYLE VARIABLES
HOW OFTEN DO YOU HAVE A DRINK CONTAINING ALCOHOL: 4 OR MORE TIMES A WEEK
HOW MANY STANDARD DRINKS CONTAINING ALCOHOL DO YOU HAVE ON A TYPICAL DAY: 5 OR 6

## 2024-08-12 ASSESSMENT — PAIN SCALES - GENERAL: PAINLEVEL_OUTOF10: 8

## 2024-08-12 ASSESSMENT — PAIN - FUNCTIONAL ASSESSMENT: PAIN_FUNCTIONAL_ASSESSMENT: 0-10

## 2024-08-12 ASSESSMENT — PAIN DESCRIPTION - LOCATION: LOCATION: ABDOMEN

## 2024-08-12 NOTE — ED TRIAGE NOTES
8/12/2024 0904 AM  Patient was contacted due to positive aerobic blood culture with strep.  Spoke to patient and his wife about results and requested that the patient return to the ER for IV antibiotics.  Patient refused to return and asked for a prescription of antibiotics.  Dr. Carranza to send an Abx prescription to patient's pharmacy.  Wife was advised to return the patient to the ER if his conditions declines and she agreed.

## 2024-08-12 NOTE — ED NOTES
Reviewed blood culture result per nursing staff request.  Spoke over the phone with Mr. Looney and family.  Very strongly encouraged calling 911 and/or returning to an emergency department ASAP  Discussed the high potential for morbidity and/or mortality if he indeed does have bacteremia that is not currently being treated.  Sent prescription for clindamycin to patient's preferred pharmacy and asked family to please fill this if for some reason Mr. Looney is unable to return to the hospital right away.     Kerwin Carranza MD  08/12/24 0790

## 2024-08-13 ENCOUNTER — ANESTHESIA (OUTPATIENT)
Dept: ENDOSCOPY | Age: 39
End: 2024-08-13
Payer: COMMERCIAL

## 2024-08-13 ENCOUNTER — ANESTHESIA EVENT (OUTPATIENT)
Dept: ENDOSCOPY | Age: 39
End: 2024-08-13
Payer: COMMERCIAL

## 2024-08-13 ENCOUNTER — APPOINTMENT (OUTPATIENT)
Dept: CT IMAGING | Age: 39
End: 2024-08-13
Payer: COMMERCIAL

## 2024-08-13 PROBLEM — K74.60 DECOMPENSATION OF CIRRHOSIS OF LIVER (HCC): Status: ACTIVE | Noted: 2024-08-13

## 2024-08-13 PROBLEM — A41.9 SEPSIS (HCC): Status: ACTIVE | Noted: 2024-08-13

## 2024-08-13 PROBLEM — F10.90 ALCOHOL USE DISORDER: Status: ACTIVE | Noted: 2024-08-13

## 2024-08-13 PROBLEM — D69.6 THROMBOCYTOPENIA (HCC): Status: ACTIVE | Noted: 2024-08-13

## 2024-08-13 PROBLEM — E87.20 LACTIC ACID ACIDOSIS: Status: ACTIVE | Noted: 2024-08-13

## 2024-08-13 PROBLEM — I85.00 PORTAL HYPERTENSION WITH ESOPHAGEAL VARICES (HCC): Status: ACTIVE | Noted: 2024-08-13

## 2024-08-13 PROBLEM — K72.90 DECOMPENSATION OF CIRRHOSIS OF LIVER (HCC): Status: ACTIVE | Noted: 2024-08-13

## 2024-08-13 PROBLEM — R78.81 BACTEREMIA: Status: ACTIVE | Noted: 2024-08-13

## 2024-08-13 PROBLEM — D61.818 PANCYTOPENIA (HCC): Status: ACTIVE | Noted: 2024-08-13

## 2024-08-13 PROBLEM — B18.2 HEP C W/O COMA, CHRONIC (HCC): Status: ACTIVE | Noted: 2024-08-13

## 2024-08-13 PROBLEM — K76.6 PORTAL HYPERTENSION WITH ESOPHAGEAL VARICES (HCC): Status: ACTIVE | Noted: 2024-08-13

## 2024-08-13 PROBLEM — K70.31 ALCOHOLIC CIRRHOSIS OF LIVER WITH ASCITES (HCC): Status: ACTIVE | Noted: 2024-08-13

## 2024-08-13 PROBLEM — R79.1 ELEVATED INR: Status: ACTIVE | Noted: 2024-08-13

## 2024-08-13 LAB
ABO + RH BLD: NORMAL
ALBUMIN SERPL-MCNC: 2.9 G/DL (ref 3.4–5)
ALBUMIN/GLOB SERPL: 0.8 {RATIO} (ref 1.1–2.2)
ALP SERPL-CCNC: 92 U/L (ref 40–129)
ALT SERPL-CCNC: 59 U/L (ref 10–40)
AMMONIA PLAS-SCNC: 43 UMOL/L (ref 16–60)
ANION GAP SERPL CALCULATED.3IONS-SCNC: 13 MMOL/L (ref 3–16)
APTT BLD: 47.5 SEC (ref 22.1–36.4)
AST SERPL-CCNC: 163 U/L (ref 15–37)
BASOPHILS # BLD: 0 K/UL (ref 0–0.2)
BASOPHILS # BLD: 0 K/UL (ref 0–0.2)
BASOPHILS NFR BLD: 1.1 %
BASOPHILS NFR BLD: 1.1 %
BILIRUB SERPL-MCNC: 14.1 MG/DL (ref 0–1)
BLD GP AB SCN SERPL QL: NORMAL
BLOOD BANK DISPENSE STATUS: NORMAL
BLOOD BANK PRODUCT CODE: NORMAL
BPU ID: NORMAL
BUN SERPL-MCNC: 10 MG/DL (ref 7–20)
CALCIUM SERPL-MCNC: 8.7 MG/DL (ref 8.3–10.6)
CHLORIDE SERPL-SCNC: 92 MMOL/L (ref 99–110)
CO2 SERPL-SCNC: 24 MMOL/L (ref 21–32)
CREAT SERPL-MCNC: <0.5 MG/DL (ref 0.9–1.3)
DEPRECATED RDW RBC AUTO: 17.5 % (ref 12.4–15.4)
DEPRECATED RDW RBC AUTO: 18 % (ref 12.4–15.4)
DEPRECATED RDW RBC AUTO: 18.1 % (ref 12.4–15.4)
DEPRECATED RDW RBC AUTO: 18.3 % (ref 12.4–15.4)
DEPRECATED RDW RBC AUTO: 18.6 % (ref 12.4–15.4)
DESCRIPTION BLOOD BANK: NORMAL
EKG ATRIAL RATE: 91 BPM
EKG DIAGNOSIS: NORMAL
EKG P AXIS: -14 DEGREES
EKG P-R INTERVAL: 146 MS
EKG Q-T INTERVAL: 394 MS
EKG QRS DURATION: 94 MS
EKG QTC CALCULATION (BAZETT): 484 MS
EKG R AXIS: 4 DEGREES
EKG T AXIS: 14 DEGREES
EKG VENTRICULAR RATE: 91 BPM
EOSINOPHIL # BLD: 0 K/UL (ref 0–0.6)
EOSINOPHIL # BLD: 0 K/UL (ref 0–0.6)
EOSINOPHIL NFR BLD: 0.9 %
EOSINOPHIL NFR BLD: 1.1 %
ETHANOLAMINE SERPL-MCNC: 53 MG/DL (ref 0–0.08)
GFR SERPLBLD CREATININE-BSD FMLA CKD-EPI: >90 ML/MIN/{1.73_M2}
GLUCOSE BLD-MCNC: 67 MG/DL (ref 70–99)
GLUCOSE SERPL-MCNC: 103 MG/DL (ref 70–99)
HCT VFR BLD AUTO: 18.4 % (ref 40.5–52.5)
HCT VFR BLD AUTO: 20.3 % (ref 40.5–52.5)
HCT VFR BLD AUTO: 20.3 % (ref 40.5–52.5)
HCT VFR BLD AUTO: 21 % (ref 40.5–52.5)
HCT VFR BLD AUTO: 22.2 % (ref 40.5–52.5)
HCT VFR BLD AUTO: 22.4 % (ref 40.5–52.5)
HGB BLD-MCNC: 6.6 G/DL (ref 13.5–17.5)
HGB BLD-MCNC: 7.3 G/DL (ref 13.5–17.5)
HGB BLD-MCNC: 7.4 G/DL (ref 13.5–17.5)
HGB BLD-MCNC: 7.6 G/DL (ref 13.5–17.5)
HGB BLD-MCNC: 7.7 G/DL (ref 13.5–17.5)
HGB BLD-MCNC: 7.8 G/DL (ref 13.5–17.5)
INR PPP: 2.61 (ref 0.85–1.15)
LACTATE BLDV-SCNC: 1.9 MMOL/L (ref 0.4–1.9)
LACTATE BLDV-SCNC: 3.1 MMOL/L (ref 0.4–1.9)
LYMPHOCYTES # BLD: 0.8 K/UL (ref 1–5.1)
LYMPHOCYTES # BLD: 0.9 K/UL (ref 1–5.1)
LYMPHOCYTES NFR BLD: 21.4 %
LYMPHOCYTES NFR BLD: 26.5 %
MAGNESIUM SERPL-MCNC: 1.8 MG/DL (ref 1.8–2.4)
MCH RBC QN AUTO: 37.9 PG (ref 26–34)
MCH RBC QN AUTO: 39 PG (ref 26–34)
MCH RBC QN AUTO: 39.2 PG (ref 26–34)
MCH RBC QN AUTO: 39.2 PG (ref 26–34)
MCH RBC QN AUTO: 39.5 PG (ref 26–34)
MCHC RBC AUTO-ENTMCNC: 35 G/DL (ref 31–36)
MCHC RBC AUTO-ENTMCNC: 35.8 G/DL (ref 31–36)
MCHC RBC AUTO-ENTMCNC: 36 G/DL (ref 31–36)
MCHC RBC AUTO-ENTMCNC: 36.4 G/DL (ref 31–36)
MCHC RBC AUTO-ENTMCNC: 36.5 G/DL (ref 31–36)
MCV RBC AUTO: 107.5 FL (ref 80–100)
MCV RBC AUTO: 107.7 FL (ref 80–100)
MCV RBC AUTO: 108.2 FL (ref 80–100)
MCV RBC AUTO: 109 FL (ref 80–100)
MCV RBC AUTO: 110 FL (ref 80–100)
MONOCYTES # BLD: 0.5 K/UL (ref 0–1.3)
MONOCYTES # BLD: 0.8 K/UL (ref 0–1.3)
MONOCYTES NFR BLD: 17.6 %
MONOCYTES NFR BLD: 19.1 %
NEUTROPHILS # BLD: 1.6 K/UL (ref 1.7–7.7)
NEUTROPHILS # BLD: 2.3 K/UL (ref 1.7–7.7)
NEUTROPHILS NFR BLD: 53.9 %
NEUTROPHILS NFR BLD: 57.3 %
PERFORMED ON: ABNORMAL
PLATELET # BLD AUTO: 36 K/UL (ref 135–450)
PLATELET # BLD AUTO: 37 K/UL (ref 135–450)
PLATELET # BLD AUTO: 40 K/UL (ref 135–450)
PLATELET # BLD AUTO: 43 K/UL (ref 135–450)
PLATELET # BLD AUTO: 52 K/UL (ref 135–450)
PMV BLD AUTO: 10.8 FL (ref 5–10.5)
PMV BLD AUTO: 9.2 FL (ref 5–10.5)
PMV BLD AUTO: 9.5 FL (ref 5–10.5)
PMV BLD AUTO: 9.6 FL (ref 5–10.5)
PMV BLD AUTO: 9.7 FL (ref 5–10.5)
POTASSIUM SERPL-SCNC: 3.4 MMOL/L (ref 3.5–5.1)
PROCALCITONIN SERPL IA-MCNC: 0.2 NG/ML (ref 0–0.15)
PROT SERPL-MCNC: 6.7 G/DL (ref 6.4–8.2)
PROTHROMBIN TIME: 27.8 SEC (ref 11.9–14.9)
RBC # BLD AUTO: 1.69 M/UL (ref 4.2–5.9)
RBC # BLD AUTO: 1.85 M/UL (ref 4.2–5.9)
RBC # BLD AUTO: 1.89 M/UL (ref 4.2–5.9)
RBC # BLD AUTO: 1.95 M/UL (ref 4.2–5.9)
RBC # BLD AUTO: 2.07 M/UL (ref 4.2–5.9)
SODIUM SERPL-SCNC: 129 MMOL/L (ref 136–145)
WBC # BLD AUTO: 2.8 K/UL (ref 4–11)
WBC # BLD AUTO: 2.9 K/UL (ref 4–11)
WBC # BLD AUTO: 3 K/UL (ref 4–11)
WBC # BLD AUTO: 3.1 K/UL (ref 4–11)
WBC # BLD AUTO: 4.1 K/UL (ref 4–11)

## 2024-08-13 PROCEDURE — 2580000003 HC RX 258: Performed by: INTERNAL MEDICINE

## 2024-08-13 PROCEDURE — 85014 HEMATOCRIT: CPT

## 2024-08-13 PROCEDURE — 6360000002 HC RX W HCPCS: Performed by: INTERNAL MEDICINE

## 2024-08-13 PROCEDURE — 99223 1ST HOSP IP/OBS HIGH 75: CPT | Performed by: INTERNAL MEDICINE

## 2024-08-13 PROCEDURE — 6370000000 HC RX 637 (ALT 250 FOR IP): Performed by: INTERNAL MEDICINE

## 2024-08-13 PROCEDURE — 87086 URINE CULTURE/COLONY COUNT: CPT

## 2024-08-13 PROCEDURE — 82140 ASSAY OF AMMONIA: CPT

## 2024-08-13 PROCEDURE — 36430 TRANSFUSION BLD/BLD COMPNT: CPT

## 2024-08-13 PROCEDURE — 94760 N-INVAS EAR/PLS OXIMETRY 1: CPT

## 2024-08-13 PROCEDURE — 93010 ELECTROCARDIOGRAM REPORT: CPT | Performed by: INTERNAL MEDICINE

## 2024-08-13 PROCEDURE — 2060000000 HC ICU INTERMEDIATE R&B

## 2024-08-13 PROCEDURE — 87040 BLOOD CULTURE FOR BACTERIA: CPT

## 2024-08-13 PROCEDURE — 2580000003 HC RX 258: Performed by: EMERGENCY MEDICINE

## 2024-08-13 PROCEDURE — 36415 COLL VENOUS BLD VENIPUNCTURE: CPT

## 2024-08-13 PROCEDURE — 70450 CT HEAD/BRAIN W/O DYE: CPT

## 2024-08-13 PROCEDURE — 85018 HEMOGLOBIN: CPT

## 2024-08-13 PROCEDURE — 99222 1ST HOSP IP/OBS MODERATE 55: CPT | Performed by: INTERNAL MEDICINE

## 2024-08-13 PROCEDURE — 6370000000 HC RX 637 (ALT 250 FOR IP): Performed by: PHYSICIAN ASSISTANT

## 2024-08-13 PROCEDURE — 94640 AIRWAY INHALATION TREATMENT: CPT

## 2024-08-13 PROCEDURE — 6360000002 HC RX W HCPCS: Performed by: EMERGENCY MEDICINE

## 2024-08-13 PROCEDURE — 85027 COMPLETE CBC AUTOMATED: CPT

## 2024-08-13 PROCEDURE — 96375 TX/PRO/DX INJ NEW DRUG ADDON: CPT

## 2024-08-13 PROCEDURE — 83605 ASSAY OF LACTIC ACID: CPT

## 2024-08-13 PROCEDURE — 85025 COMPLETE CBC W/AUTO DIFF WBC: CPT

## 2024-08-13 RX ORDER — SODIUM CHLORIDE 0.9 % (FLUSH) 0.9 %
5-40 SYRINGE (ML) INJECTION EVERY 12 HOURS SCHEDULED
Status: DISCONTINUED | OUTPATIENT
Start: 2024-08-13 | End: 2024-08-15 | Stop reason: HOSPADM

## 2024-08-13 RX ORDER — VANCOMYCIN 1.75 G/350ML
1250 INJECTION, SOLUTION INTRAVENOUS EVERY 8 HOURS
Status: DISCONTINUED | OUTPATIENT
Start: 2024-08-13 | End: 2024-08-13

## 2024-08-13 RX ORDER — GAUZE BANDAGE 2" X 2"
100 BANDAGE TOPICAL DAILY
Status: DISCONTINUED | OUTPATIENT
Start: 2024-08-13 | End: 2024-08-13

## 2024-08-13 RX ORDER — LORAZEPAM 2 MG/ML
3 INJECTION INTRAMUSCULAR
Status: DISCONTINUED | OUTPATIENT
Start: 2024-08-13 | End: 2024-08-15 | Stop reason: HOSPADM

## 2024-08-13 RX ORDER — SODIUM CHLORIDE 9 MG/ML
INJECTION, SOLUTION INTRAVENOUS PRN
Status: DISCONTINUED | OUTPATIENT
Start: 2024-08-13 | End: 2024-08-15 | Stop reason: HOSPADM

## 2024-08-13 RX ORDER — BUDESONIDE AND FORMOTEROL FUMARATE DIHYDRATE 80; 4.5 UG/1; UG/1
2 AEROSOL RESPIRATORY (INHALATION)
Status: DISCONTINUED | OUTPATIENT
Start: 2024-08-13 | End: 2024-08-15 | Stop reason: HOSPADM

## 2024-08-13 RX ORDER — LACTULOSE 10 G/15ML
20 SOLUTION ORAL 3 TIMES DAILY
Status: DISCONTINUED | OUTPATIENT
Start: 2024-08-13 | End: 2024-08-15 | Stop reason: HOSPADM

## 2024-08-13 RX ORDER — LORAZEPAM 1 MG/1
1 TABLET ORAL
Status: DISCONTINUED | OUTPATIENT
Start: 2024-08-13 | End: 2024-08-15 | Stop reason: HOSPADM

## 2024-08-13 RX ORDER — LORAZEPAM 2 MG/ML
4 INJECTION INTRAMUSCULAR
Status: DISCONTINUED | OUTPATIENT
Start: 2024-08-13 | End: 2024-08-15 | Stop reason: HOSPADM

## 2024-08-13 RX ORDER — LORAZEPAM 2 MG/ML
1 INJECTION INTRAMUSCULAR
Status: DISCONTINUED | OUTPATIENT
Start: 2024-08-13 | End: 2024-08-15 | Stop reason: HOSPADM

## 2024-08-13 RX ORDER — SODIUM CHLORIDE 0.9 % (FLUSH) 0.9 %
5-40 SYRINGE (ML) INJECTION PRN
Status: DISCONTINUED | OUTPATIENT
Start: 2024-08-13 | End: 2024-08-15 | Stop reason: HOSPADM

## 2024-08-13 RX ORDER — FUROSEMIDE 20 MG/1
20 TABLET ORAL DAILY
Status: DISCONTINUED | OUTPATIENT
Start: 2024-08-13 | End: 2024-08-15 | Stop reason: HOSPADM

## 2024-08-13 RX ORDER — LORAZEPAM 1 MG/1
3 TABLET ORAL
Status: DISCONTINUED | OUTPATIENT
Start: 2024-08-13 | End: 2024-08-15 | Stop reason: HOSPADM

## 2024-08-13 RX ORDER — LORAZEPAM 2 MG/ML
0.5 INJECTION INTRAMUSCULAR ONCE
Status: COMPLETED | OUTPATIENT
Start: 2024-08-13 | End: 2024-08-13

## 2024-08-13 RX ORDER — LORAZEPAM 1 MG/1
2 TABLET ORAL
Status: DISCONTINUED | OUTPATIENT
Start: 2024-08-13 | End: 2024-08-15 | Stop reason: HOSPADM

## 2024-08-13 RX ORDER — FOLIC ACID 1 MG/1
1 TABLET ORAL DAILY
Status: DISCONTINUED | OUTPATIENT
Start: 2024-08-13 | End: 2024-08-15 | Stop reason: HOSPADM

## 2024-08-13 RX ORDER — ALBUTEROL SULFATE 90 UG/1
2 AEROSOL, METERED RESPIRATORY (INHALATION) 4 TIMES DAILY PRN
Status: DISCONTINUED | OUTPATIENT
Start: 2024-08-13 | End: 2024-08-15 | Stop reason: HOSPADM

## 2024-08-13 RX ORDER — ONDANSETRON 2 MG/ML
4 INJECTION INTRAMUSCULAR; INTRAVENOUS EVERY 6 HOURS PRN
Status: DISCONTINUED | OUTPATIENT
Start: 2024-08-13 | End: 2024-08-15 | Stop reason: HOSPADM

## 2024-08-13 RX ORDER — GAUZE BANDAGE 2" X 2"
100 BANDAGE TOPICAL DAILY
Status: DISCONTINUED | OUTPATIENT
Start: 2024-08-13 | End: 2024-08-15 | Stop reason: HOSPADM

## 2024-08-13 RX ORDER — LORAZEPAM 1 MG/1
4 TABLET ORAL
Status: DISCONTINUED | OUTPATIENT
Start: 2024-08-13 | End: 2024-08-15 | Stop reason: HOSPADM

## 2024-08-13 RX ORDER — DEXTROSE MONOHYDRATE 100 MG/ML
INJECTION, SOLUTION INTRAVENOUS CONTINUOUS PRN
Status: DISCONTINUED | OUTPATIENT
Start: 2024-08-13 | End: 2024-08-15 | Stop reason: HOSPADM

## 2024-08-13 RX ORDER — METRONIDAZOLE 500 MG/100ML
500 INJECTION, SOLUTION INTRAVENOUS EVERY 8 HOURS
Status: DISCONTINUED | OUTPATIENT
Start: 2024-08-13 | End: 2024-08-15 | Stop reason: HOSPADM

## 2024-08-13 RX ORDER — LORAZEPAM 2 MG/ML
2 INJECTION INTRAMUSCULAR
Status: DISCONTINUED | OUTPATIENT
Start: 2024-08-13 | End: 2024-08-15 | Stop reason: HOSPADM

## 2024-08-13 RX ORDER — GLUCAGON 1 MG/ML
1 KIT INJECTION PRN
Status: DISCONTINUED | OUTPATIENT
Start: 2024-08-13 | End: 2024-08-15 | Stop reason: HOSPADM

## 2024-08-13 RX ORDER — SODIUM CHLORIDE 9 MG/ML
INJECTION, SOLUTION INTRAVENOUS CONTINUOUS
Status: DISCONTINUED | OUTPATIENT
Start: 2024-08-13 | End: 2024-08-14

## 2024-08-13 RX ADMIN — Medication 100 MG: at 08:53

## 2024-08-13 RX ADMIN — CEFEPIME 2000 MG: 2 INJECTION, POWDER, FOR SOLUTION INTRAVENOUS at 16:52

## 2024-08-13 RX ADMIN — BUDESONIDE AND FORMOTEROL FUMARATE DIHYDRATE 2 PUFF: 80; 4.5 AEROSOL RESPIRATORY (INHALATION) at 20:57

## 2024-08-13 RX ADMIN — LORAZEPAM 1 MG: 2 INJECTION INTRAMUSCULAR; INTRAVENOUS at 12:55

## 2024-08-13 RX ADMIN — BUDESONIDE AND FORMOTEROL FUMARATE DIHYDRATE 2 PUFF: 80; 4.5 AEROSOL RESPIRATORY (INHALATION) at 08:29

## 2024-08-13 RX ADMIN — VANCOMYCIN HYDROCHLORIDE 1750 MG: 1 INJECTION, POWDER, LYOPHILIZED, FOR SOLUTION INTRAVENOUS at 00:27

## 2024-08-13 RX ADMIN — SODIUM CHLORIDE, PRESERVATIVE FREE 10 ML: 5 INJECTION INTRAVENOUS at 08:54

## 2024-08-13 RX ADMIN — OCTREOTIDE ACETATE 50 MCG/HR: 500 INJECTION, SOLUTION INTRAVENOUS; SUBCUTANEOUS at 20:38

## 2024-08-13 RX ADMIN — FOLIC ACID 1 MG: 1 TABLET ORAL at 08:53

## 2024-08-13 RX ADMIN — SODIUM CHLORIDE, PRESERVATIVE FREE 40 MG: 5 INJECTION INTRAVENOUS at 08:53

## 2024-08-13 RX ADMIN — FUROSEMIDE 20 MG: 20 TABLET ORAL at 08:53

## 2024-08-13 RX ADMIN — VANCOMYCIN 1250 MG: 1.75 INJECTION, SOLUTION INTRAVENOUS at 09:03

## 2024-08-13 RX ADMIN — SODIUM CHLORIDE: 9 INJECTION, SOLUTION INTRAVENOUS at 03:31

## 2024-08-13 RX ADMIN — SODIUM CHLORIDE: 9 INJECTION, SOLUTION INTRAVENOUS at 15:25

## 2024-08-13 RX ADMIN — LORAZEPAM 2 MG: 2 INJECTION INTRAMUSCULAR; INTRAVENOUS at 19:45

## 2024-08-13 RX ADMIN — DEXTROSE MONOHYDRATE 125 ML: 100 INJECTION, SOLUTION INTRAVENOUS at 23:59

## 2024-08-13 RX ADMIN — POTASSIUM BICARBONATE 40 MEQ: 782 TABLET, EFFERVESCENT ORAL at 03:38

## 2024-08-13 RX ADMIN — LORAZEPAM 0.5 MG: 2 INJECTION INTRAMUSCULAR; INTRAVENOUS at 11:03

## 2024-08-13 RX ADMIN — METRONIDAZOLE 500 MG: 500 INJECTION, SOLUTION INTRAVENOUS at 22:39

## 2024-08-13 RX ADMIN — RIFAXIMIN 550 MG: 550 TABLET ORAL at 11:06

## 2024-08-13 RX ADMIN — CEFEPIME 2000 MG: 2 INJECTION, POWDER, FOR SOLUTION INTRAVENOUS at 09:06

## 2024-08-13 ASSESSMENT — PAIN DESCRIPTION - FREQUENCY: FREQUENCY: CONTINUOUS

## 2024-08-13 ASSESSMENT — PAIN SCALES - GENERAL
PAINLEVEL_OUTOF10: 7
PAINLEVEL_OUTOF10: 0

## 2024-08-13 ASSESSMENT — PAIN DESCRIPTION - ORIENTATION: ORIENTATION: MID

## 2024-08-13 ASSESSMENT — PAIN DESCRIPTION - LOCATION: LOCATION: ABDOMEN

## 2024-08-13 ASSESSMENT — PAIN DESCRIPTION - PAIN TYPE: TYPE: CHRONIC PAIN

## 2024-08-13 ASSESSMENT — PAIN DESCRIPTION - DESCRIPTORS: DESCRIPTORS: SORE;ACHING

## 2024-08-13 ASSESSMENT — PAIN DESCRIPTION - ONSET: ONSET: ON-GOING

## 2024-08-13 ASSESSMENT — PAIN - FUNCTIONAL ASSESSMENT: PAIN_FUNCTIONAL_ASSESSMENT: PREVENTS OR INTERFERES SOME ACTIVE ACTIVITIES AND ADLS

## 2024-08-13 NOTE — CONSULTS
Clinical Pharmacy Note  Vancomycin Consult    Rashad Looney is a 39 y.o. male ordered Vancomycin for Bacteremia; consult received from Dr. Mcmanus to manage therapy. Also receiving Cefepime.    Allergies:  Codeine     Temp max:  Temp (24hrs), Av.9 °F (36.6 °C), Min:97.8 °F (36.6 °C), Max:98.1 °F (36.7 °C)      Recent Labs     24  0954 24  2340   WBC 4.8 4.1       Recent Labs     24  0954 24  2340   BUN 16 10   CREATININE <0.5* <0.5*       No intake or output data in the 24 hours ending 24 0320    Culture Results:      Ht Readings from Last 1 Encounters:   24 1.753 m (5' 9\")        Wt Readings from Last 1 Encounters:   24 83.8 kg (184 lb 11.9 oz)         Estimated Creatinine Clearance: 200 mL/min (based on SCr of 0.5 mg/dL).    Assessment/Plan:  Day # 1 of vancomycin.  Vancomycin 1250 mg IV every 8 hours ordered.    Goal -600  Predicted AUC 24-48hrs: 493  Predicted AUC steady state: 535    Thank you for the consult.   Wei Rosa, PharmD

## 2024-08-13 NOTE — PROGRESS NOTES
Patient admitted from ED, a&o x4, Significant other at bedside ( Priyanka), VSS, telemetry applied, MAR and order reviwed. Pt oriented to room and how to use call light. No concern at this time.    Electronically signed by Glen Armando RN on 8/13/2024 at 3:07 AM

## 2024-08-13 NOTE — CONSULTS
Infectious Diseases Inpatient Consult Note      Reason for Consult: Positive blood culture lactic acidosis and cirrhosis of liver    Requesting Physician:       Primary Care Physician:  Lolis Mcmanus MD    History Obtained From:  Epic      CHIEF COMPLAINT:     Chief Complaint   Patient presents with    Blood Infection     Pt was contacted by hospital staff and directed to return to the hospital to receive IV antibiotics due to an infection in his blood steam; pt reports that he is an alcoholic, until today he had not drank alcohol in 4-5 days, he had 1 shot of liquor at 1900 tonight; pt has end stage liver failure and reports abdominal pain         HISTORY OF PRESENT ILLNESS:  39 y.o. male with a history of alcohol abuse, hepatitis C, cirrhosis of liver with ascites history of hepatic encephalopathy and esophageal varices history of band ligation also history of pancreatitis secondary to alcohol use history of drug use admitted to hospital secondary to abdominal pain increasing distention noted to having increasing ascites patient was recently admitted to outside hospital secondary to decompensated cirrhosis with bleeding varices underwent paracentesis and transfusion.  Patient underwent banding of esophageal varices but patient left AMA from the hospital blood culture from 8/11 1 set positive for Streptococcus speciation pending, patient was called back to the ER for admission.  Labs indicate lactic acid 2.7 bilirubin at 19 ALT 44 creatinine 0.5 WBC 3.1 hemoglobin 6.6 platelets are 43.  Blood culture in process, no recorded fevers, CT head negative  Chest x-ray negative.  We are consulted for recommendations.  He is unable to provide any history secondary to ongoing change in mental status. Pt was at  from 8/9/24 to  8/10/24 per chart pt left AMA before GI could perform EGD and band ligation         Past Medical History:    Past Medical History:   Diagnosis Date    Adverse anesthesia outcome

## 2024-08-13 NOTE — ED NOTES
Pt to room 5 from [facility] at this time. Report received from JANAY Yee. All questions answered.

## 2024-08-13 NOTE — PLAN OF CARE
Problem: Discharge Planning  Goal: Discharge to home or other facility with appropriate resources  8/13/2024 1719 by Marybeth Gentile, RN  Outcome: Progressing     Problem: Pain  Goal: Verbalizes/displays adequate comfort level or baseline comfort level  8/13/2024 1719 by Marybeth Gentile, RN  Outcome: Progressing     Problem: Safety - Adult  Goal: Free from fall injury  8/13/2024 1719 by Marybeth Gentile, RN  Outcome: Progressing     Problem: ABCDS Injury Assessment  Goal: Absence of physical injury  Outcome: Progressing

## 2024-08-13 NOTE — PROGRESS NOTES
I reviewed Mr. Looney's history of clinical events over the last few days and discussed concerns of moving forward with care at [facility]. I consulted with three anesthesia colleagues, including Dr. Rayo.  Because of the acute changes in this patient's clinical picture, it would not be safe to provide the care this patient needs, given the limited blood bank resources.  The patient's labs indicate he is having acute decompensation of his liver cirrhosis (platelets dropped from 60s to 30s in 24 hours, anemia requiring transfusion, and INR acutely increased to 2.6).  We discussed the situation with Dr. Spears and cancelled the EGD for variceal banding, and also discussed the full clinical situation to patient  - who was not responding to me  - and wife at bedside.  Message was provided to JANAY Modi in patient's unit with recommendation to transfer patient back to  for tertiary care.    Erica Alicea MD  Anesthesiologist

## 2024-08-13 NOTE — PROGRESS NOTES
Last time patient woke up in the PACU after an ENDO patient was very disoriented and agressive trying to leave the hospital. Patient significant other at bedside, and usually able to keep him calm, she is willing to come to PACU to help keep him calm if necessary.

## 2024-08-13 NOTE — PROGRESS NOTES
I have placed a call to the  transfer center. I am awaiting a call back from an accepting physician. If continues to bleed overnight please transfuse with PRBCs until  can accept the patient. No point in transfusing platelets as they will immediately be sequestered by the spleen and also no need to transfuse FFP (patients with cirrhosis have a balanced coagulopathy) as the correction will be transient and the increased volume will increase risk of further bleeding from suspected varices.     Addendum: I spoke to  accepting physician and they are willing to take but will like be 2-3 days before a bed is available. Keep NPO. Discussed with Dr. Mcmanus.

## 2024-08-13 NOTE — CONSULTS
GASTROENTEROLOGY INPATIENT CONSULTATION        IDENTIFYING DATA/REASON FOR CONSULTATION   PATIENT:  Rashad Looney  MRN:  [id number]  ADMIT DATE: 8/12/2024  TIME OF EVALUATION: 8/13/2024 8:44 AM  HOSPITAL STAY:   LOS: 0 days     REASON FOR CONSULTATION:  cirrhosis    HISTORY OF PRESENT ILLNESS   Rashad Looney is a 39 y.o. male with a PMH of ETOH/HCV cirrhosis d/b ascites, hepatic encephalopathy, esophageal varices with band ligation, hepatitis C with spontaneous clearance, alcohol induced pancreatitis, ongoing alcohol use, recent cocaine use who presented on 8/12/2024 with increasing abdominal pain and distention. Patient was just admitted at [facility] for decompensated cirrhosis with ascites and bleeding esophageal varices. He underwent a paracentesis with 3.25L removed. He received 1U PRBCs. Plan was to undergo upper endoscopy for possible band ligation but he left AMA because he felt that  was not meeting his needs for treatment. He has a positive blood culture from 8/11 with streptococcus. He is currently being treated with Cefepime and Vancomycin. His Hgb was 6.6 during this admission and 1 U PRBCs have been ordered. He denies taking diuretics at home. He had one shot of liquor prior to arrival yesterday. Denies hematochezia or hematemesis.    Hgb 7.3 --> 6.6  Creatinine normal  Albumin 2.9  LFTs with Bilirubin 14.1, ALT 59,   INR 2.61  Ammonia 43  Lactic acid 3.1 --> 1.9.      Prior Endoscopic Evaluations:  EGD 6/7/24 by Dr Bourgeois  - Medium sized esophageal varices x 3. Three bands are placed with proximal decompression of varices.   - Moderate portal gastropathy   - No gastric varices   - Normal duodenum     EGD 1/2024 with Dr. Spears:  1.The distal esophagus had trace grade 1 varices with several banding ulcers in the distal esophagus. No evidence of active variceal bleeding.      EGD 12/26/2023 with Dr. Farmer:  1) There were 2 columns of medium Grade II esophageal varices without bleeding stigmata

## 2024-08-13 NOTE — PLAN OF CARE
Problem: Discharge Planning  Goal: Discharge to home or other facility with appropriate resources  Outcome: Progressing     Problem: Pain  Goal: Verbalizes/displays adequate comfort level or baseline comfort level  Outcome: Progressing     Problem: Safety - Adult  Goal: Free from fall injury  Outcome: Progressing  Flowsheets (Taken 8/13/2024 4705)  Free From Fall Injury: Instruct family/caregiver on patient safety

## 2024-08-13 NOTE — PROGRESS NOTES
Resumed patient care. Patient had nose bleed earlier but has now stopped. Urine is dark and a little bloody. Patient has a large bruise that is soft on Left Lower abdomen where his paracentesis was. Nurse will continue to monitor. One time dose of Ativan given. Patient NPO and awaiting EGD. Girlfriend at bedside. Awaiting results of H&H post blood transfusion. IV antibiotics and fluids infusing.

## 2024-08-13 NOTE — ED PROVIDER NOTES
[facility] EMERGENCY DEPARTMENT    EMERGENCY DEPARTMENT ENCOUNTER        Patient Name: Rashad Looney  MRN: [id number]  Birthdate 1985  Date of evaluation: 8/12/2024  PCP: Lolis Mcmanus MD  Note Started: 12:22 AM EDT 8/13/24    CHIEF COMPLAINT       Blood Infection (Pt was contacted by hospital staff and directed to return to the hospital to receive IV antibiotics due to an infection in his blood steam; pt reports that he is an alcoholic, until today he had not drank alcohol in 4-5 days, he had 1 shot of liquor at 1900 tonight; pt has end stage liver failure and reports abdominal pain)      HISTORY OF PRESENT ILLNESS: 1 or more Elements       Rashad Looney is a 39 y.o. male with history of alcoholic cirrhosis, GI bleed, pancreatitis, who presents to the emergency department for evaluation of positive blood culture.  Patient reports receiving a call asking him to come into the ER so he could receive IV antibiotics.    Says he left AMA from  today.  Per outside hospital chart review, he was admitted to  hospital for decompensated cirrhosis with ascites and bleeding esophageal varices.  He had large-volume paracentesis and received 1 unit packed red blood cell with increase in hemoglobin to 7.8.  Plan was for GI to scope with possible band ligation.  However, patient left AGAINST MEDICAL ADVICE.    No other complaints, modifying factors or associated symptoms.     History obtained by the patient unless stated otherwise as above on HPI.   No limitations unless specified as above on HPI.     Past medical history:   Past Medical History:   Diagnosis Date    Alcohol use disorder     Alcoholic cirrhosis of liver with ascites (HCC)     2 L drained june 2024    Anemia     Asthma     Chest tightness     Chronic liver disease     Class 1 obesity     Dyspnea on exertion     sob with any exertion, even talking    Esophageal varices (HCC)     Hepatitis C     Pancytopenia (HCC)     Slurred speech      abnormality    RADIOLOGY:     Non-plain film images such as CT, Ultrasound and MRI are read by the radiologist. Plain radiographic images personally reviewed.     Interpretation per the Radiologist below, if available at the time of this note:  CT HEAD WO CONTRAST   Final Result   No acute intracranial abnormality is identified.         XR CHEST PORTABLE   Final Result   No acute airspace disease identified.           XR CHEST PORTABLE    Result Date: 8/12/2024  EXAMINATION: ONE XRAY VIEW OF THE CHEST 8/12/2024 11:11 pm COMPARISON: 08/11/2024 HISTORY: ORDERING SYSTEM PROVIDED HISTORY: + bl cult TECHNOLOGIST PROVIDED HISTORY: Reason for exam:->+ bl cult Reason for Exam: Blood Infection FINDINGS: Shallow inflation.  The cardiomediastinal silhouette is within normal limits. There is no consolidation, pneumothorax or evidence for edema. No evidence for effusion.  No acute osseous abnormality is identified.     No acute airspace disease identified.     CT ABDOMEN PELVIS W IV CONTRAST Additional Contrast? None    Result Date: 8/11/2024  EXAMINATION: CT OF THE ABDOMEN AND PELVIS WITH CONTRAST 8/11/2024 10:59 am TECHNIQUE: CT of the abdomen and pelvis was performed with the administration of intravenous contrast. Multiplanar reformatted images are provided for review. Automated exposure control, iterative reconstruction, and/or weight based adjustment of the mA/kV was utilized to reduce the radiation dose to as low as reasonably achievable. COMPARISON: 6 June 2024 HISTORY: ORDERING SYSTEM PROVIDED HISTORY: ESLD with ascites TECHNOLOGIST PROVIDED HISTORY: Reason for exam:->ESLD with ascites Additional Contrast?->None Decision Support Exception - unselect if not a suspected or confirmed emergency medical condition->Emergency Medical Condition (MA) Reason for Exam: ESLD with ascites FINDINGS: Lower Chest: Small pleural effusion and minimal atelectasis in the right base. Organs: Fatty replaced liver with a nodular margin

## 2024-08-13 NOTE — PROGRESS NOTES
Patient's EGD was canceled by anesthesia. I have a high suspicion for a variceal bleed which was the concern at  when he left AMA. I do not think it is safe to attempt sedate with conscious sedation with his history of polysubstance abuse.  I would recommend we proceed with transferring patient to  now. I suspect the patient will continue to bleed and the Gastroenterology service cannot perform an EGD to attempt to stop the bleeding without anesthesia support.

## 2024-08-13 NOTE — FLOWSHEET NOTE
08/13/24 1037   Treatment Team Notification   Reason for Communication Patient/Family request  (request for ativan for anxiety (ciwa score currently a 6) and prn zofran)   Name of Team Member Notified Dr. Mcmanus   Treatment Team Role Attending Provider   Method of Communication Call   Response See orders   Notification Time 1038     Electronically signed by Mariam Barcenas RN on 8/13/2024 at 10:38 AM

## 2024-08-13 NOTE — PROGRESS NOTES
Patient states he usually has 26 drinks a day at home up until mid last week. Patient stated he did have a shot yesterday because he was \"freaking out\".    Patient significant other stated he was hallucinating and trying to leave the hospital before he had the drink. Patient has a hx of leaving the hospital when family member are not at bedside.     Currently not showing signs of withdraw.

## 2024-08-13 NOTE — PROGRESS NOTES
Patient procedure to be canceled per MD Alicea. MD Alicea in to talk to patient and family. Report called to floor JANAY Modi, all questions answered at this time.

## 2024-08-13 NOTE — CARE COORDINATION
Case Management Assessment  Initial Evaluation    Date/Time of Evaluation: 8/13/2024 12:16 PM  Assessment Completed by: Anne Marie Ramirez RN    If patient is discharged prior to next notation, then this note serves as note for discharge by case management.    Patient Name: Rashad Looney                   YOB: 1985  Diagnosis: Sepsis (HCC) [A41.9]  Alcohol use disorder [F10.90]  Decompensation of cirrhosis of liver (HCC) [K72.90, K74.60]                   Date / Time: 8/12/2024 11:03 PM    Patient Admission Status: Inpatient   Readmission Risk (Low < 19, Mod (19-27), High > 27): Readmission Risk Score: 28.5    Current PCP: Lolis Mcmanus MD  PCP verified by CM? Yes    Chart Reviewed: Yes      History Provided by: Patient  Patient Orientation: Alert and Oriented    Patient Cognition: Alert    Hospitalization in the last 30 days (Readmission):  No    If yes, Readmission Assessment in CM Navigator will be completed.    Advance Directives:      Code Status: Prior   Patient's Primary Decision Maker is: Legal Next of Kin    Primary Decision Maker: Priyanka Looney - Spouse - [phone]    Discharge Planning:    Patient lives with: Spouse/Significant Other Type of Home: House (ramped entrance)  Primary Care Giver: Self  Patient Support Systems include: Spouse/Significant Other, Family Members, Parent   Current Financial resources: Medicaid  Current community resources: None  Current services prior to admission: None            Current DME:              Type of Home Care services:  None    ADLS  Prior functional level: Independent in ADLs/IADLs  Current functional level: Assistance with the following:, Mobility    PT AM-PAC:   /24  OT AM-PAC:   /24    Family can provide assistance at DC: Yes  Would you like Case Management to discuss the discharge plan with any other family members/significant others, and if so, who? Yes (wife at bedside)  Plans to Return to Present Housing: Yes  Other Identified

## 2024-08-13 NOTE — ACP (ADVANCE CARE PLANNING)
Advance Care Planning     Advance Care Planning Activator (Inpatient)  Conversation Note      Date of ACP Conversation: 8/13/2024     Conversation Conducted with: Legal next of kin    ACP Activator: Anne Marie Ramirez RN    Health Care Decision Maker:     Current Designated Health Care Decision Maker:     Primary Decision Maker: Priyanka Looney - Spouse - [phone]    Today we documented Decision Maker(s) consistent with Legal Next of Kin hierarchy.    Care Preferences    Ventilation:  \"If you were in your present state of health and suddenly became very ill and were unable to breathe on your own, what would your preference be about the use of a ventilator (breathing machine) if it were available to you?\"      Would the patient desire the use of ventilator (breathing machine)?: yes    \"If your health worsens and it becomes clear that your chance of recovery is unlikely, what would your preference be about the use of a ventilator (breathing machine) if it were available to you?\"     Would the patient desire the use of ventilator (breathing machine)?: Yes      Resuscitation  \"CPR works best to restart the heart when there is a sudden event, like a heart attack, in someone who is otherwise healthy. Unfortunately, CPR does not typically restart the heart for people who have serious health conditions or who are very sick.\"    \"In the event your heart stopped as a result of an underlying serious health condition, would you want attempts to be made to restart your heart (answer \"yes\" for attempt to resuscitate) or would you prefer a natural death (answer \"no\" for do not attempt to resuscitate)?\" yes       [] Yes   [x] No   Educated Patient / Decision Maker regarding differences between Advance Directives and portable DNR orders.    Length of ACP Conversation in minutes:  5 minutes    Conversation Outcomes:  ACP discussion completed    Follow-up plan:    [] Schedule follow-up conversation to continue planning  [] Referred  individual to Provider for additional questions/concerns   [] Advised patient/agent/surrogate to review completed ACP document and update if needed with changes in condition, patient preferences or care setting    [] This note routed to one or more involved healthcare providers -attending is patient's PCP--not routed as code status known by PCP    Electronically signed by Anne Marie Ramirez RN Case Management on 8/13/2024 at 12:18 PM
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
## 2024-08-13 NOTE — PROGRESS NOTES
4 Eyes Skin Assessment     NAME:  Rashad Looney  YOB: 1985  MEDICAL RECORD NUMBER:  [id number]    The patient is being assessed for  Admission    I agree that at least one RN has performed a thorough Head to Toe Skin Assessment on the patient. ALL assessment sites listed below have been assessed.      Areas assessed by both nurses:    Head, Face, Ears, Shoulders, Back, Chest, Arms, Elbows, Hands, Sacrum. Buttock, Coccyx, Ischium, and Legs. Feet and Heels        Does the Patient have a Wound? No noted wound(s)       Wing Prevention initiated by RN: Yes  Wound Care Orders initiated by RN: No    Pressure Injury (Stage 3,4, Unstageable, DTI, NWPT, and Complex wounds) if present, place Wound referral order by RN under : No    New Ostomies, if present place, Ostomy referral order under : No     Nurse 1 eSignature: Electronically signed by Glen Armando RN on 8/13/24 at 3:11 AM EDT    **SHARE this note so that the co-signing nurse can place an eSignature**    Nurse 2 eSignature: Electronically signed by Rolan Woodall RN on 8/13/24 at 5:58 AM EDT

## 2024-08-13 NOTE — PROGRESS NOTES
Nurse talked with Dr. Mcmanus and she would like for Dr. Thornton to reach her directly. Dr. Thornton called and was given Dr. Mcmanus number and she will reach out to her directly about transfer to .

## 2024-08-13 NOTE — PROGRESS NOTES
Dr. Mcmanus called about critical lab and awaiting call back. Patient will remain NPO. Octreotide drip to be started by night shift nurse. Hgb remains above 7.

## 2024-08-13 NOTE — PROGRESS NOTES
Received critical morning lab Hemoglobin 6.6 & Hematocrit 18.4. Attending notified at 0511, awaiting response.    Electronically signed by Glen Armando RN on 8/13/2024 at 5:52 AM

## 2024-08-13 NOTE — PROGRESS NOTES
Patient is very lethargic. Charge nurse and  called and told of patients decline. Nurse attempted Dr. Mcmanus office multiple times and no answer. Nurse received her cell number and no answer. Will continue to call office. Girlfriend at bedside and aware.

## 2024-08-14 LAB
ALBUMIN SERPL-MCNC: 2.4 G/DL (ref 3.4–5)
ALBUMIN/GLOB SERPL: 0.8 {RATIO} (ref 1.1–2.2)
ALP SERPL-CCNC: 59 U/L (ref 40–129)
ALT SERPL-CCNC: 54 U/L (ref 10–40)
ANION GAP SERPL CALCULATED.3IONS-SCNC: 10 MMOL/L (ref 3–16)
AST SERPL-CCNC: 141 U/L (ref 15–37)
BACTERIA BLD CULT: ABNORMAL
BACTERIA UR CULT: NORMAL
BILIRUB SERPL-MCNC: 16.4 MG/DL (ref 0–1)
BUN SERPL-MCNC: 12 MG/DL (ref 7–20)
CALCIUM SERPL-MCNC: 7.7 MG/DL (ref 8.3–10.6)
CHLORIDE SERPL-SCNC: 98 MMOL/L (ref 99–110)
CO2 SERPL-SCNC: 25 MMOL/L (ref 21–32)
CREAT SERPL-MCNC: 0.6 MG/DL (ref 0.9–1.3)
DEPRECATED RDW RBC AUTO: 18.4 % (ref 12.4–15.4)
DEPRECATED RDW RBC AUTO: 18.6 % (ref 12.4–15.4)
DEPRECATED RDW RBC AUTO: 18.8 % (ref 12.4–15.4)
DEPRECATED RDW RBC AUTO: 19.3 % (ref 12.4–15.4)
GFR SERPLBLD CREATININE-BSD FMLA CKD-EPI: >90 ML/MIN/{1.73_M2}
GLUCOSE BLD-MCNC: 106 MG/DL (ref 70–99)
GLUCOSE BLD-MCNC: 112 MG/DL (ref 70–99)
GLUCOSE BLD-MCNC: 137 MG/DL (ref 70–99)
GLUCOSE BLD-MCNC: 163 MG/DL (ref 70–99)
GLUCOSE BLD-MCNC: 185 MG/DL (ref 70–99)
GLUCOSE SERPL-MCNC: 104 MG/DL (ref 70–99)
HCT VFR BLD AUTO: 21.2 % (ref 40.5–52.5)
HCT VFR BLD AUTO: 21.5 % (ref 40.5–52.5)
HCT VFR BLD AUTO: 21.6 % (ref 40.5–52.5)
HCT VFR BLD AUTO: 22.7 % (ref 40.5–52.5)
HGB BLD-MCNC: 7.4 G/DL (ref 13.5–17.5)
HGB BLD-MCNC: 7.6 G/DL (ref 13.5–17.5)
HGB BLD-MCNC: 7.8 G/DL (ref 13.5–17.5)
HGB BLD-MCNC: 8.1 G/DL (ref 13.5–17.5)
MCH RBC QN AUTO: 38.3 PG (ref 26–34)
MCH RBC QN AUTO: 38.4 PG (ref 26–34)
MCH RBC QN AUTO: 38.6 PG (ref 26–34)
MCH RBC QN AUTO: 39 PG (ref 26–34)
MCHC RBC AUTO-ENTMCNC: 34.8 G/DL (ref 31–36)
MCHC RBC AUTO-ENTMCNC: 35.3 G/DL (ref 31–36)
MCHC RBC AUTO-ENTMCNC: 35.7 G/DL (ref 31–36)
MCHC RBC AUTO-ENTMCNC: 36.1 G/DL (ref 31–36)
MCV RBC AUTO: 108.1 FL (ref 80–100)
MCV RBC AUTO: 108.3 FL (ref 80–100)
MCV RBC AUTO: 108.7 FL (ref 80–100)
MCV RBC AUTO: 110.1 FL (ref 80–100)
ORGANISM: ABNORMAL
PATH INTERP BLD-IMP: YES
PERFORMED ON: ABNORMAL
PLATELET # BLD AUTO: 40 K/UL (ref 135–450)
PLATELET # BLD AUTO: 40 K/UL (ref 135–450)
PLATELET # BLD AUTO: 44 K/UL (ref 135–450)
PLATELET # BLD AUTO: 44 K/UL (ref 135–450)
PMV BLD AUTO: 10.1 FL (ref 5–10.5)
PMV BLD AUTO: 9.1 FL (ref 5–10.5)
PMV BLD AUTO: 9.5 FL (ref 5–10.5)
PMV BLD AUTO: 9.9 FL (ref 5–10.5)
POTASSIUM SERPL-SCNC: 3.8 MMOL/L (ref 3.5–5.1)
PROT SERPL-MCNC: 5.5 G/DL (ref 6.4–8.2)
RBC # BLD AUTO: 1.92 M/UL (ref 4.2–5.9)
RBC # BLD AUTO: 1.99 M/UL (ref 4.2–5.9)
RBC # BLD AUTO: 1.99 M/UL (ref 4.2–5.9)
RBC # BLD AUTO: 2.09 M/UL (ref 4.2–5.9)
SODIUM SERPL-SCNC: 133 MMOL/L (ref 136–145)
WBC # BLD AUTO: 2.6 K/UL (ref 4–11)
WBC # BLD AUTO: 2.7 K/UL (ref 4–11)
WBC # BLD AUTO: 2.9 K/UL (ref 4–11)
WBC # BLD AUTO: 2.9 K/UL (ref 4–11)

## 2024-08-14 PROCEDURE — 2580000003 HC RX 258: Performed by: INTERNAL MEDICINE

## 2024-08-14 PROCEDURE — 6360000002 HC RX W HCPCS: Performed by: INTERNAL MEDICINE

## 2024-08-14 PROCEDURE — 99232 SBSQ HOSP IP/OBS MODERATE 35: CPT | Performed by: INTERNAL MEDICINE

## 2024-08-14 PROCEDURE — 94760 N-INVAS EAR/PLS OXIMETRY 1: CPT

## 2024-08-14 PROCEDURE — 6370000000 HC RX 637 (ALT 250 FOR IP): Performed by: INTERNAL MEDICINE

## 2024-08-14 PROCEDURE — 2060000000 HC ICU INTERMEDIATE R&B

## 2024-08-14 PROCEDURE — 80053 COMPREHEN METABOLIC PANEL: CPT

## 2024-08-14 PROCEDURE — 6370000000 HC RX 637 (ALT 250 FOR IP): Performed by: PHYSICIAN ASSISTANT

## 2024-08-14 PROCEDURE — 85027 COMPLETE CBC AUTOMATED: CPT

## 2024-08-14 PROCEDURE — 36415 COLL VENOUS BLD VENIPUNCTURE: CPT

## 2024-08-14 PROCEDURE — 94640 AIRWAY INHALATION TREATMENT: CPT

## 2024-08-14 PROCEDURE — 99233 SBSQ HOSP IP/OBS HIGH 50: CPT | Performed by: INTERNAL MEDICINE

## 2024-08-14 RX ORDER — DEXTROSE MONOHYDRATE AND SODIUM CHLORIDE 5; .9 G/100ML; G/100ML
INJECTION, SOLUTION INTRAVENOUS CONTINUOUS
Status: DISCONTINUED | OUTPATIENT
Start: 2024-08-14 | End: 2024-08-15 | Stop reason: HOSPADM

## 2024-08-14 RX ORDER — OXYCODONE HYDROCHLORIDE 5 MG/1
5 TABLET ORAL EVERY 6 HOURS PRN
Status: DISCONTINUED | OUTPATIENT
Start: 2024-08-14 | End: 2024-08-15 | Stop reason: HOSPADM

## 2024-08-14 RX ORDER — PHYTONADIONE 5 MG/1
5 TABLET ORAL DAILY
Status: DISCONTINUED | OUTPATIENT
Start: 2024-08-14 | End: 2024-08-15 | Stop reason: HOSPADM

## 2024-08-14 RX ORDER — DEXTROSE MONOHYDRATE AND SODIUM CHLORIDE 5; .45 G/100ML; G/100ML
INJECTION, SOLUTION INTRAVENOUS CONTINUOUS
Status: DISCONTINUED | OUTPATIENT
Start: 2024-08-14 | End: 2024-08-14

## 2024-08-14 RX ADMIN — SODIUM CHLORIDE, PRESERVATIVE FREE 10 ML: 5 INJECTION INTRAVENOUS at 09:04

## 2024-08-14 RX ADMIN — LACTULOSE 20 G: 10 SOLUTION ORAL at 14:13

## 2024-08-14 RX ADMIN — DEXTROSE AND SODIUM CHLORIDE: 5; 900 INJECTION, SOLUTION INTRAVENOUS at 15:09

## 2024-08-14 RX ADMIN — LORAZEPAM 1 MG: 1 TABLET ORAL at 11:36

## 2024-08-14 RX ADMIN — Medication 100 MG: at 09:04

## 2024-08-14 RX ADMIN — PHYTONADIONE 5 MG: 5 TABLET ORAL at 18:37

## 2024-08-14 RX ADMIN — METRONIDAZOLE 500 MG: 500 INJECTION, SOLUTION INTRAVENOUS at 06:55

## 2024-08-14 RX ADMIN — METRONIDAZOLE 500 MG: 500 INJECTION, SOLUTION INTRAVENOUS at 14:12

## 2024-08-14 RX ADMIN — SODIUM CHLORIDE, PRESERVATIVE FREE 10 ML: 5 INJECTION INTRAVENOUS at 21:16

## 2024-08-14 RX ADMIN — CEFEPIME 2000 MG: 2 INJECTION, POWDER, FOR SOLUTION INTRAVENOUS at 04:06

## 2024-08-14 RX ADMIN — DEXTROSE AND SODIUM CHLORIDE: 5; 900 INJECTION, SOLUTION INTRAVENOUS at 00:48

## 2024-08-14 RX ADMIN — FOLIC ACID 1 MG: 1 TABLET ORAL at 09:04

## 2024-08-14 RX ADMIN — METRONIDAZOLE 500 MG: 500 INJECTION, SOLUTION INTRAVENOUS at 23:34

## 2024-08-14 RX ADMIN — RIFAXIMIN 550 MG: 550 TABLET ORAL at 09:04

## 2024-08-14 RX ADMIN — LORAZEPAM 1 MG: 1 TABLET ORAL at 09:17

## 2024-08-14 RX ADMIN — LORAZEPAM 1 MG: 1 TABLET ORAL at 21:28

## 2024-08-14 RX ADMIN — FUROSEMIDE 20 MG: 20 TABLET ORAL at 09:04

## 2024-08-14 RX ADMIN — BUDESONIDE AND FORMOTEROL FUMARATE DIHYDRATE 2 PUFF: 80; 4.5 AEROSOL RESPIRATORY (INHALATION) at 21:25

## 2024-08-14 RX ADMIN — LORAZEPAM 2 MG: 2 INJECTION INTRAMUSCULAR; INTRAVENOUS at 17:29

## 2024-08-14 RX ADMIN — CEFEPIME 2000 MG: 2 INJECTION, POWDER, FOR SOLUTION INTRAVENOUS at 15:29

## 2024-08-14 RX ADMIN — BUDESONIDE AND FORMOTEROL FUMARATE DIHYDRATE 2 PUFF: 80; 4.5 AEROSOL RESPIRATORY (INHALATION) at 08:38

## 2024-08-14 RX ADMIN — LACTULOSE 20 G: 10 SOLUTION ORAL at 09:04

## 2024-08-14 RX ADMIN — SODIUM CHLORIDE, PRESERVATIVE FREE 40 MG: 5 INJECTION INTRAVENOUS at 09:04

## 2024-08-14 RX ADMIN — RIFAXIMIN 550 MG: 550 TABLET ORAL at 21:14

## 2024-08-14 RX ADMIN — OCTREOTIDE ACETATE 50 MCG/HR: 500 INJECTION, SOLUTION INTRAVENOUS; SUBCUTANEOUS at 09:06

## 2024-08-14 RX ADMIN — ONDANSETRON 4 MG: 2 INJECTION INTRAMUSCULAR; INTRAVENOUS at 11:36

## 2024-08-14 RX ADMIN — OXYCODONE HYDROCHLORIDE 5 MG: 5 TABLET ORAL at 15:29

## 2024-08-14 ASSESSMENT — PAIN DESCRIPTION - PAIN TYPE: TYPE: ACUTE PAIN;CHRONIC PAIN

## 2024-08-14 ASSESSMENT — PAIN DESCRIPTION - ORIENTATION
ORIENTATION: MID;LEFT
ORIENTATION: RIGHT;LEFT;LOWER

## 2024-08-14 ASSESSMENT — PAIN DESCRIPTION - ONSET: ONSET: ON-GOING

## 2024-08-14 ASSESSMENT — PAIN DESCRIPTION - FREQUENCY: FREQUENCY: CONTINUOUS

## 2024-08-14 ASSESSMENT — PAIN DESCRIPTION - DESCRIPTORS
DESCRIPTORS: ACHING;SORE;CRAMPING
DESCRIPTORS: ACHING;DISCOMFORT;SORE;SHARP

## 2024-08-14 ASSESSMENT — PAIN SCALES - GENERAL
PAINLEVEL_OUTOF10: 2
PAINLEVEL_OUTOF10: 2
PAINLEVEL_OUTOF10: 8
PAINLEVEL_OUTOF10: 0

## 2024-08-14 ASSESSMENT — PAIN - FUNCTIONAL ASSESSMENT
PAIN_FUNCTIONAL_ASSESSMENT: ACTIVITIES ARE NOT PREVENTED
PAIN_FUNCTIONAL_ASSESSMENT: ACTIVITIES ARE NOT PREVENTED

## 2024-08-14 ASSESSMENT — PAIN DESCRIPTION - LOCATION
LOCATION: ABDOMEN;BACK
LOCATION: ABDOMEN

## 2024-08-14 ASSESSMENT — PAIN SCALES - WONG BAKER: WONGBAKER_NUMERICALRESPONSE: NO HURT

## 2024-08-14 NOTE — PLAN OF CARE
Problem: Discharge Planning  Goal: Discharge to home or other facility with appropriate resources  8/14/2024 0259 by Glen Armando RN  Outcome: Progressing     Problem: Pain  Goal: Verbalizes/displays adequate comfort level or baseline comfort level  8/14/2024 0259 by Glen Armando RN  Outcome: Progressing  Flowsheets (Taken 8/14/2024 0259)  Verbalizes/displays adequate comfort level or baseline comfort level: Assess pain using appropriate pain scale     Problem: Safety - Adult  Goal: Free from fall injury  8/14/2024 0259 by Glen Armando RN  Outcome: Progressing  Flowsheets (Taken 8/14/2024 0259)  Free From Fall Injury: Instruct family/caregiver on patient safety     Problem: ABCDS Injury Assessment  Goal: Absence of physical injury  8/14/2024 0259 by Glen Armando RN  Outcome: Progressing  Flowsheets (Taken 8/14/2024 0259)  Absence of Physical Injury: Implement safety measures based on patient assessment     Problem: Skin/Tissue Integrity  Goal: Absence of new skin breakdown  Description: 1.  Monitor for areas of redness and/or skin breakdown  2.  Assess vascular access sites hourly  3.  Every 4-6 hours minimum:  Change oxygen saturation probe site  4.  Every 4-6 hours:  If on nasal continuous positive airway pressure, respiratory therapy assess nares and determine need for appliance change or resting period.  Outcome: Progressing  Note: Assess skin integrity per shift, apply wound treatment per unit protocol

## 2024-08-14 NOTE — PROGRESS NOTES
Hospitalist Progress Note  8/14/2024 1:41 PM  Subjective:   Admit Date: 8/12/2024  PCP: Lolis Mcmanus MD  Interval History: pt feels little better  C/o back pain  Meds are not helping  Denies any other complaints  Chart reviewed.  Diet: Diet NPO  Medications:   Scheduled Meds:   folic acid  1 mg Oral Daily    furosemide  20 mg Oral Daily    budesonide-formoterol  2 puff Inhalation BID RT    vitamin B-1  100 mg Oral Daily    pantoprazole (PROTONIX) 40 mg in sodium chloride (PF) 0.9 % 10 mL injection  40 mg IntraVENous Daily    sodium chloride flush  5-40 mL IntraVENous 2 times per day    lactulose  20 g Oral TID    rifAXIMin  550 mg Oral BID    cefepime  2,000 mg IntraVENous Q12H    metroNIDAZOLE  500 mg IntraVENous Q8H     Continuous Infusions:   dextrose 5 % and 0.9 % NaCl 75 mL/hr at 08/14/24 0613    sodium chloride      sodium chloride      octreotide (SANDOSTATIN) 500 mcg in sodium chloride 0.9 % 100 mL infusion 50 mcg/hr (08/14/24 0906)    dextrose       CBC:   Recent Labs     08/13/24  2143 08/14/24  0059 08/14/24  0634   WBC 2.9* 2.9* 2.7*   HGB 7.6* 7.8* 7.6*   PLT 37* 40* 40*     BMP:    Recent Labs     08/12/24  2340   *   K 3.4*   CL 92*   CO2 24   BUN 10   CREATININE <0.5*   GLUCOSE 103*     Hepatic:   Recent Labs     08/12/24  2340   *   ALT 59*   BILITOT 14.1*   ALKPHOS 92     Troponin: No results for input(s): \"TROPONINI\" in the last 72 hours.  BNP: No results for input(s): \"BNP\" in the last 72 hours.  Lipids: No results for input(s): \"CHOL\", \"HDL\" in the last 72 hours.    Invalid input(s): \"LDLCALCU\"  INR:   Recent Labs     08/12/24  2340   INR 2.61*       Objective:   Vitals: /76   Pulse 87   Temp 98.6 °F (37 °C) (Oral)   Resp 18   Ht 1.753 m (5' 9\")   Wt 76.2 kg (167 lb 15.9 oz)   SpO2 96%   BMI 24.81 kg/m²   General appearance: alert,awake,oriented x 3 and cooperative with exam  HEENT: Head: Normal, normocephalic, atraumatic, anicteric, pupils are reactive to light.
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
## 2024-08-14 NOTE — PROGRESS NOTES
Comprehensive Nutrition Assessment    Type and Reason for Visit:  Initial, Positive Nutrition Screen (weight loss, decreased appetite)    Nutrition Recommendations/Plan:    To remain NPO at this time, will monitor if anything changes with plan of care     Malnutrition Assessment:  Malnutrition Status:  At risk for malnutrition (Comment) (current condition, weight trending down and fluctuating, decreased appetite) (08/14/24 1234)      Nutrition Assessment:    MST=3 due to decreased appetite and poor po intake.  Patient admitted with sepsis.  NPO since yesterday morning.  Weight loss noted greater than 10% in the past 4 months per EMR, but does show fluctuations as well.  History of alcohol cirrhosis noted so their could be fluid shifts with weight trends.  Patient to stay NPO, to be transferred to  when bed available.    Nutrition Related Findings:    BG low this am requiring D5 bolus per EMR; last BM on 8/12; +2 pitting edema bilateral legs Wound Type: None       Current Nutrition Intake & Therapies:    Average Meal Intake: NPO  Average Supplements Intake: NPO  Diet NPO    Anthropometric Measures:  Height: 175.3 cm (5' 9\")  Ideal Body Weight (IBW): 160 lbs (73 kg)       Current Body Weight: 76.2 kg (167 lb 15.9 oz),   IBW. Weight Source: Bed Scale  Current BMI (kg/m2): 24.8                          BMI Categories: Normal Weight (BMI 18.5-24.9)    Estimated Daily Nutrient Needs:  Energy Requirements Based On: Kcal/kg  Weight Used for Energy Requirements: Current  Energy (kcal/day): 1964-0928 (25-30 kcal/76.2 kg)  Weight Used for Protein Requirements: Current  Protein (g/day): 91-99 (1.2-1.3 g/76.2 kg)  Method Used for Fluid Requirements: 1 ml/kcal  Fluid (ml/day):      Nutrition Diagnosis:   Inadequate energy intake related to lack or limited access to food as evidenced by NPO or clear liquid status due to medical condition    Nutrition Interventions:   Food and/or Nutrient Delivery: Continue NPO     Coordination

## 2024-08-14 NOTE — PROGRESS NOTES
INPATIENT PROGRESS NOTE        IDENTIFYING DATA/REASON FOR CONSULTATION   PATIENT:  Rashad Looney  MRN:  [id number]  ADMIT DATE: 2024  TIME OF EVALUATION: 2024 1:03 PM  HOSPITAL STAY:   LOS: 1 day   CONSULTING PHYSICIAN: Lolis Mcmanus MD   REASON FOR CONSULTATION: hematemesis, cirrhosis    Subjective:    Patient seen in follow up.  Denies any melena or hematochezia overnight.  Hgb stable.      MEDICATIONS   SCHEDULED:  folic acid, 1 mg, Daily  furosemide, 20 mg, Daily  budesonide-formoterol, 2 puff, BID RT  vitamin B-1, 100 mg, Daily  pantoprazole (PROTONIX) 40 mg in sodium chloride (PF) 0.9 % 10 mL injection, 40 mg, Daily  sodium chloride flush, 5-40 mL, 2 times per day  lactulose, 20 g, TID  rifAXIMin, 550 mg, BID  cefepime, 2,000 mg, Q12H  metroNIDAZOLE, 500 mg, Q8H      FLUIDS/DRIPS:     dextrose 5 % and 0.9 % NaCl 75 mL/hr at 24 0613    sodium chloride      sodium chloride      octreotide (SANDOSTATIN) 500 mcg in sodium chloride 0.9 % 100 mL infusion 50 mcg/hr (24 0906)    dextrose       PRNs: albuterol sulfate HFA, 2 puff, 4x Daily PRN  sodium chloride flush, 5-40 mL, PRN  sodium chloride, , PRN  LORazepam, 1 mg, Q1H PRN   Or  LORazepam, 1 mg, Q1H PRN   Or  LORazepam, 2 mg, Q1H PRN   Or  LORazepam, 2 mg, Q1H PRN   Or  LORazepam, 3 mg, Q1H PRN   Or  LORazepam, 3 mg, Q1H PRN   Or  LORazepam, 4 mg, Q1H PRN   Or  LORazepam, 4 mg, Q1H PRN  sodium chloride, , PRN  ondansetron, 4 mg, Q6H PRN  glucose, 4 tablet, PRN  dextrose bolus, 125 mL, PRN   Or  dextrose bolus, 250 mL, PRN  glucagon (rDNA), 1 mg, PRN  dextrose, , Continuous PRN      ALLERGIES:    Allergies   Allergen Reactions    Codeine          PHYSICAL EXAM   VITALS:  /76   Pulse 87   Temp 98.6 °F (37 °C) (Oral)   Resp 18   Ht 1.753 m (5' 9\")   Wt 76.2 kg (167 lb 15.9 oz)   SpO2 96%   BMI 24.81 kg/m²   TEMPERATURE:  Current - Temp: 98.6 °F (37 °C); Max - Temp  Av.4 °F (36.9 °C)  Min: 97.2 °F (36.2 °C)  Max: 99 °F      Rashad Looney is a 39 y.o. male with a PMH of ETOH/HCV cirrhosis d/b ascites, hepatic encephalopathy, esophageal varices with band ligation, hepatitis C with spontaneous clearance, alcohol induced pancreatitis, ongoing alcohol use, recent cocaine use who presented on 8/12/2024 with increasing abdominal pain and distention. Patient had a paracentesis at  and also found to have strep bacteremia. Patient was unsatisfied with care so presented to [facility]. Patient had reported hematemesis at  but none here since admission. I recommend an EGD (hx variceal bleeds) and attempted to perform EGD yesterday but anesthesia refused to sedate. Anesthesia recommend patient be transferred to . I called  and they accepted patient but thought it would be 2-3 days before patient could be transferred. Luckily, patient's hemoglobin was stable overnight and no reported bleeding. Continue PPI drip/Octreotide drip.I would not recommend transfusing platelets as they will immediately be sequestered by the spleen and also no need to transfuse FFP (patients with cirrhosis have a balanced coagulopathy) as the correction will be transient and the increased volume will increase risk of further bleeding from suspected varices. Keep on a clear liquid diet. Continue IV antibiotics per ID recommendations for strep viridans bacteremia. Ok for Vitamin K. Monitor CIWA. If continues to drop H/H support with PRBCs until can be transferred to .     Thank you for allowing me to participate in this patient's care.  If there are any questions or concerns regarding this patient, or the plan we have set in place, please feel free to contact me at [phone].     Lambert Spears MD

## 2024-08-14 NOTE — PROGRESS NOTES
Glucose POCT at midnight was 67, D10W 125cc bolus administer per protocol, Dr. Mcmanus notified. New orders reviewed. Repeat POCT Glucose 112.    Electronically signed by Glen Armando RN on 8/14/2024 at 12:56 AM

## 2024-08-14 NOTE — CARE COORDINATION
Per notes, Dr Mcmanus initiated a transfer to  via 's access center ([phone]). Await bed to open at .   RN can f/u with  access center by calling [phone] if has questions about status of transfer and bed availability.     Electronically signed by Anne Marie Ramirez RN Case Management on 8/14/2024 at 12:11 PM

## 2024-08-14 NOTE — PLAN OF CARE
Problem: Discharge Planning  Goal: Discharge to home or other facility with appropriate resources  8/14/2024 1104 by Rosa Braun RN  Outcome: Progressing  8/14/2024 0259 by Glen Armando RN  Outcome: Progressing     Problem: Pain  Goal: Verbalizes/displays adequate comfort level or baseline comfort level  8/14/2024 1104 by Rosa Braun RN  Outcome: Progressing  8/14/2024 0259 by Glen Armando RN  Outcome: Progressing  Flowsheets (Taken 8/14/2024 0259)  Verbalizes/displays adequate comfort level or baseline comfort level: Assess pain using appropriate pain scale     Problem: Safety - Adult  Goal: Free from fall injury  8/14/2024 1104 by Rosa Braun RN  Outcome: Progressing  8/14/2024 0259 by Glen Armando RN  Outcome: Progressing  Flowsheets (Taken 8/14/2024 0259)  Free From Fall Injury: Instruct family/caregiver on patient safety     Problem: ABCDS Injury Assessment  Goal: Absence of physical injury  8/14/2024 1104 by Rosa Braun RN  Outcome: Progressing  8/14/2024 0259 by Glen Armando RN  Outcome: Progressing  Flowsheets (Taken 8/14/2024 0259)  Absence of Physical Injury: Implement safety measures based on patient assessment     Problem: Skin/Tissue Integrity  Goal: Absence of new skin breakdown  Description: 1.  Monitor for areas of redness and/or skin breakdown  2.  Assess vascular access sites hourly  3.  Every 4-6 hours minimum:  Change oxygen saturation probe site  4.  Every 4-6 hours:  If on nasal continuous positive airway pressure, respiratory therapy assess nares and determine need for appliance change or resting period.  8/14/2024 1104 by Rosa Braun RN  Outcome: Progressing  8/14/2024 0259 by Glen Armando RN  Outcome: Progressing  Note: Assess skin integrity per shift, apply wound treatment per unit protocol

## 2024-08-14 NOTE — PROGRESS NOTES
Today's events noted  D/w the anesthesiologist, she is not comfortable in giving anaesthesia because of low platelets. D/w Dr Spears, he felt comfortable in doing the procedure but he is not able to do because of the lack of anesthesia support.  So decision was made to transfer the pt to   They are not able to take the pt for couple of days. Will have to monitor the h/h and transfuse prn  D/w staff, pt is alert and awake and stable.  Poor prognosis

## 2024-08-14 NOTE — PROGRESS NOTES
Comprehensive Nutrition Assessment    Type and Reason for Visit:  Initial, Positive Nutrition Screen (weight loss, decreased appetite)    Nutrition Recommendations/Plan:   ***     Malnutrition Assessment:  Malnutrition Status:       Context:        Findings of the 6 clinical characteristics of malnutrition:  {Malnutrition Characteristics:67719}    Nutrition Assessment:    MST=3 due to decreased appetite and poor po intake.  Patient admitted with sepsis.  NPO since yesterday morning.  Weight loss noted greater than 10% in the past 4 months per EMR, but does show fluctuations as well.  History of alcohol cirrhosis noted so their could be fluid shifts with weight trends.  Patient to stay NPO, to be transferred to  when bed available.    Nutrition Related Findings:    BG low this am requiring D5 bolus per EMR; last BM on 8/12; +2 pitting edema bilateral legs Wound Type: None       Current Nutrition Intake & Therapies:    Average Meal Intake: NPO  Average Supplements Intake: NPO  Diet NPO    Anthropometric Measures:  Height: 175.3 cm (5' 9\")  Ideal Body Weight (IBW): 160 lbs (73 kg)       Current Body Weight: 76.2 kg (167 lb 15.9 oz),   IBW. Weight Source: Bed Scale  Current BMI (kg/m2): 24.8                          BMI Categories: Normal Weight (BMI 18.5-24.9)    Estimated Daily Nutrient Needs:  Energy Requirements Based On: Kcal/kg  Weight Used for Energy Requirements: Current  Energy (kcal/day): 0612-1318 (25-30 kcal/76.2 kg)  Weight Used for Protein Requirements: Current  Protein (g/day): 91-99 (1.2-1.3 g/76.2 kg)  Method Used for Fluid Requirements: 1 ml/kcal  Fluid (ml/day):      Nutrition Diagnosis:   Inadequate energy intake related to lack or limited access to food as evidenced by NPO or clear liquid status due to medical condition    Nutrition Interventions:   Food and/or Nutrient Delivery: Continue NPO     Coordination of Nutrition Care: Continue to monitor while inpatient       Goals:             Nutrition

## 2024-08-15 ENCOUNTER — APPOINTMENT (OUTPATIENT)
Dept: ULTRASOUND IMAGING | Age: 39
End: 2024-08-15
Payer: COMMERCIAL

## 2024-08-15 ENCOUNTER — APPOINTMENT (OUTPATIENT)
Dept: CT IMAGING | Age: 39
End: 2024-08-15
Payer: COMMERCIAL

## 2024-08-15 VITALS
TEMPERATURE: 99.1 F | HEART RATE: 81 BPM | OXYGEN SATURATION: 95 % | HEIGHT: 69 IN | WEIGHT: 167.99 LBS | BODY MASS INDEX: 24.88 KG/M2 | RESPIRATION RATE: 24 BRPM | SYSTOLIC BLOOD PRESSURE: 120 MMHG | DIASTOLIC BLOOD PRESSURE: 76 MMHG

## 2024-08-15 LAB
BILIRUB UR QL STRIP.AUTO: ABNORMAL
CLARITY UR: CLEAR
COLOR UR: ABNORMAL
DEPRECATED RDW RBC AUTO: 18.1 % (ref 12.4–15.4)
DEPRECATED RDW RBC AUTO: 18.4 % (ref 12.4–15.4)
EPI CELLS #/AREA URNS HPF: NORMAL /HPF (ref 0–5)
GLUCOSE BLD-MCNC: 133 MG/DL (ref 70–99)
GLUCOSE BLD-MCNC: 140 MG/DL (ref 70–99)
GLUCOSE BLD-MCNC: 146 MG/DL (ref 70–99)
GLUCOSE BLD-MCNC: 151 MG/DL (ref 70–99)
GLUCOSE UR STRIP.AUTO-MCNC: ABNORMAL MG/DL
HCT VFR BLD AUTO: 20.9 % (ref 40.5–52.5)
HCT VFR BLD AUTO: 21.3 % (ref 40.5–52.5)
HGB BLD-MCNC: 7.6 G/DL (ref 13.5–17.5)
HGB BLD-MCNC: 7.7 G/DL (ref 13.5–17.5)
HGB UR QL STRIP.AUTO: ABNORMAL
KETONES UR STRIP.AUTO-MCNC: ABNORMAL MG/DL
LEUKOCYTE ESTERASE UR QL STRIP.AUTO: ABNORMAL
MCH RBC QN AUTO: 39 PG (ref 26–34)
MCH RBC QN AUTO: 39.3 PG (ref 26–34)
MCHC RBC AUTO-ENTMCNC: 36.2 G/DL (ref 31–36)
MCHC RBC AUTO-ENTMCNC: 36.3 G/DL (ref 31–36)
MCV RBC AUTO: 107.7 FL (ref 80–100)
MCV RBC AUTO: 108.2 FL (ref 80–100)
NITRITE UR QL STRIP.AUTO: ABNORMAL
PATH INTERP BLD-IMP: NORMAL
PERFORMED ON: ABNORMAL
PH UR STRIP.AUTO: ABNORMAL [PH] (ref 5–8)
PLATELET # BLD AUTO: 34 K/UL (ref 135–450)
PLATELET # BLD AUTO: 35 K/UL (ref 135–450)
PMV BLD AUTO: 10.2 FL (ref 5–10.5)
PMV BLD AUTO: 9.6 FL (ref 5–10.5)
PROT UR STRIP.AUTO-MCNC: ABNORMAL MG/DL
RBC # BLD AUTO: 1.94 M/UL (ref 4.2–5.9)
RBC # BLD AUTO: 1.97 M/UL (ref 4.2–5.9)
RBC #/AREA URNS HPF: NORMAL /HPF (ref 0–4)
REASON FOR REJECTION: NORMAL
REJECTED TEST: NORMAL
SP GR UR STRIP.AUTO: ABNORMAL (ref 1–1.03)
UA DIPSTICK W REFLEX MICRO PNL UR: YES
URN SPEC COLLECT METH UR: ABNORMAL
UROBILINOGEN UR STRIP-ACNC: ABNORMAL E.U./DL
WBC # BLD AUTO: 2.6 K/UL (ref 4–11)
WBC # BLD AUTO: 2.6 K/UL (ref 4–11)
WBC #/AREA URNS HPF: NORMAL /HPF (ref 0–5)

## 2024-08-15 PROCEDURE — 94760 N-INVAS EAR/PLS OXIMETRY 1: CPT

## 2024-08-15 PROCEDURE — 6370000000 HC RX 637 (ALT 250 FOR IP): Performed by: PHYSICIAN ASSISTANT

## 2024-08-15 PROCEDURE — 2709999900 US GUIDED PARACENTESIS

## 2024-08-15 PROCEDURE — 6370000000 HC RX 637 (ALT 250 FOR IP): Performed by: INTERNAL MEDICINE

## 2024-08-15 PROCEDURE — 2580000003 HC RX 258: Performed by: INTERNAL MEDICINE

## 2024-08-15 PROCEDURE — 85027 COMPLETE CBC AUTOMATED: CPT

## 2024-08-15 PROCEDURE — 6360000002 HC RX W HCPCS: Performed by: INTERNAL MEDICINE

## 2024-08-15 PROCEDURE — 36415 COLL VENOUS BLD VENIPUNCTURE: CPT

## 2024-08-15 PROCEDURE — 0W9G3ZZ DRAINAGE OF PERITONEAL CAVITY, PERCUTANEOUS APPROACH: ICD-10-PCS | Performed by: PSYCHIATRY & NEUROLOGY

## 2024-08-15 PROCEDURE — 81001 URINALYSIS AUTO W/SCOPE: CPT

## 2024-08-15 PROCEDURE — 94640 AIRWAY INHALATION TREATMENT: CPT

## 2024-08-15 PROCEDURE — 99232 SBSQ HOSP IP/OBS MODERATE 35: CPT | Performed by: INTERNAL MEDICINE

## 2024-08-15 RX ADMIN — FOLIC ACID 1 MG: 1 TABLET ORAL at 09:24

## 2024-08-15 RX ADMIN — METRONIDAZOLE 500 MG: 500 INJECTION, SOLUTION INTRAVENOUS at 06:40

## 2024-08-15 RX ADMIN — PHYTONADIONE 5 MG: 5 TABLET ORAL at 09:53

## 2024-08-15 RX ADMIN — BUDESONIDE AND FORMOTEROL FUMARATE DIHYDRATE 2 PUFF: 80; 4.5 AEROSOL RESPIRATORY (INHALATION) at 07:31

## 2024-08-15 RX ADMIN — CEFEPIME 2000 MG: 2 INJECTION, POWDER, FOR SOLUTION INTRAVENOUS at 04:12

## 2024-08-15 RX ADMIN — DEXTROSE AND SODIUM CHLORIDE: 5; 900 INJECTION, SOLUTION INTRAVENOUS at 04:08

## 2024-08-15 RX ADMIN — RIFAXIMIN 550 MG: 550 TABLET ORAL at 09:24

## 2024-08-15 RX ADMIN — FUROSEMIDE 20 MG: 20 TABLET ORAL at 09:24

## 2024-08-15 RX ADMIN — Medication 100 MG: at 09:24

## 2024-08-15 RX ADMIN — LORAZEPAM 2 MG: 2 INJECTION INTRAMUSCULAR; INTRAVENOUS at 07:54

## 2024-08-15 RX ADMIN — LORAZEPAM 1 MG: 1 TABLET ORAL at 04:07

## 2024-08-15 RX ADMIN — SODIUM CHLORIDE, PRESERVATIVE FREE 40 MG: 5 INJECTION INTRAVENOUS at 07:54

## 2024-08-15 NOTE — CARE COORDINATION
Spoke to  [facility] ([phone]), they are still awaiting a bed to open at . Will let nursing know when bed becomes available.    Electronically signed by Anne Marie Ramirez RN Case Management on 8/15/2024 at 11:41 AM

## 2024-08-15 NOTE — PROGRESS NOTES
trace Grade I EV with banding ulcers.  EGD 6/7/24 with medium sized esophageal varices, band ligated   -Ascites: s/p paracentesis at [facility] with 3.5L removed. On Lasix 20mg daily. Will discuss escalation with Dr. Spears.  -Hepatic encephalopathy: H/o hepatic encephalopathy. Will start lactulose and Xifaxan. Goal of 2-3 bms daily.  -HCC: CT abdomen/pelvis 8/2024 negative recommend every 6 month interval screening  -Immune status: Immune to hepatitis B, needs outpatient vaccination hepatitis A with primary care provider  -Transplant candidacy: Needs formal alcohol rehab with 6 months of documented sobriety.    Acute on chronic anemia: received 1u RPBC, hgb responded well.  No overt signs of blood loss reported. EGD cancelled yesterday due to low platelet count.  Continue to monitor     Thrombocytopenia 2/2 splenomegaly and portal hypertension    Streptococcus viridans bacteremia.  ID following    Alcohol use disorder: would continue to encourage complete cessation. Would benefit from formal rehab.    RECOMMENDATIONS:    -Awaiting transfer to   -No signs of bleeding. Continuing to treat for possible variceal bleed medically with an Octreotide/PPI drip. I attempted to perform a EGD but anesthesia refused to sedate. Anesthesia recommend patient be transferred to . I called  and they accepted patient but thought it would be 2-3 days before patient could be transferred. I would not recommend transfusing platelets as they will immediately be sequestered by the spleen and also no need to transfuse FFP (patients with cirrhosis have a balanced coagulopathy) as the correction will be transient and the increased volume will increase risk of further bleeding from suspected varices.Continue Vitamin K  -Continue Lactulose 20 mg TID and Rifaximin 550 mg bid  -Will order a repeat Paracentesis.   -Continue diuretics with close monitoring of renal function.   -Advance diet after paracentesis    Thank you for allowing me to  participate in this patient's care.  If there are any questions or concerns regarding this patient, or the plan we have set in place, please feel free to contact me at [phone].     Lambert Spears MD

## 2024-08-15 NOTE — PROGRESS NOTES
Hospitalist Progress Note  8/15/2024 11:21 AM  Subjective:   Admit Date: 8/12/2024  PCP: Lolis Mcmanus MD  Interval History: pt feels little better  Meds are  helping  Denies any other complaints  Chart reviewed.  Diet: ADULT DIET; Regular  Medications:   Scheduled Meds:   phytonadione  5 mg Oral Daily    folic acid  1 mg Oral Daily    furosemide  20 mg Oral Daily    budesonide-formoterol  2 puff Inhalation BID RT    vitamin B-1  100 mg Oral Daily    pantoprazole (PROTONIX) 40 mg in sodium chloride (PF) 0.9 % 10 mL injection  40 mg IntraVENous Daily    sodium chloride flush  5-40 mL IntraVENous 2 times per day    lactulose  20 g Oral TID    rifAXIMin  550 mg Oral BID    cefepime  2,000 mg IntraVENous Q12H    metroNIDAZOLE  500 mg IntraVENous Q8H     Continuous Infusions:   dextrose 5 % and 0.9 % NaCl 75 mL/hr at 08/15/24 0701    sodium chloride      sodium chloride      octreotide (SANDOSTATIN) 500 mcg in sodium chloride 0.9 % 100 mL infusion Stopped (08/14/24 2132)    dextrose       CBC:   Recent Labs     08/14/24  1929 08/15/24  0059 08/15/24  0505   WBC 2.6* 2.6* 2.6*   HGB 7.4* 7.7* 7.6*   PLT 44* 34* 35*     BMP:    Recent Labs     08/12/24  2340 08/14/24  0634   * 133*   K 3.4* 3.8   CL 92* 98*   CO2 24 25   BUN 10 12   CREATININE <0.5* 0.6*   GLUCOSE 103* 104*     Hepatic:   Recent Labs     08/12/24  2340 08/14/24  0634   * 141*   ALT 59* 54*   BILITOT 14.1* 16.4*   ALKPHOS 92 59     Troponin: No results for input(s): \"TROPONINI\" in the last 72 hours.  BNP: No results for input(s): \"BNP\" in the last 72 hours.  Lipids: No results for input(s): \"CHOL\", \"HDL\" in the last 72 hours.    Invalid input(s): \"LDLCALCU\"  INR:   Recent Labs     08/12/24  2340   INR 2.61*       Objective:   Vitals: /76   Pulse 81   Temp 99.1 °F (37.3 °C) (Oral)   Resp 24   Ht 1.753 m (5' 9\")   Wt 76.2 kg (167 lb 15.9 oz)   SpO2 95%   BMI 24.81 kg/m²   General appearance: alert,awake,oriented x 3 and  cooperative with exam  HEENT: Head: Normal, normocephalic, atraumatic, anicteric, pupils are reactive to light. Dry mucous membrane.  Neck: no adenopathy, no carotid bruit, supple, symmetrical, trachea midline and thyroid not enlarged, symmetric, no tenderness/mass/nodules  Lungs: clear to auscultation bilaterally  Heart: regular rate and rhythm, S1, S2 normal, no murmur, click, rub or gallop  Abdomen: soft, -tender; bowel sounds normal; no masses,  no organomegaly  Extremities: extremities edematous  Old bruises on abd wall  Neurologic: Mental status: Alert, oriented, thought content appropriate    Assessment and Plan:   Gi bleed- monitor h/h  Hepatic encephalopathy-slightly better  H/o alcohol abuse-pt is agreeable to to stop, ciwa protocol  Ascites- s/p paracentesis    Patient Active Problem List:     Pancreatitis, unspecified pancreatitis type     Tobacco dependence     Alcohol abuse     Cholelithiasis     Acute cholecystitis     Hematemesis with nausea     GI bleed     Cellulitis     Anemia     Generalized abdominal pain     Sepsis (HCC)     Decompensation of cirrhosis of liver (HCC)     Alcohol use disorder     Alcoholic cirrhosis of liver with ascites (HCC)     Hep C w/o coma, chronic (HCC)     Elevated INR     Portal hypertension with esophageal varices (HCC)     Thrombocytopenia (HCC)     Bacteremia     Lactic acid acidosis     Pancytopenia (HCC)    Pt is stable.  Discussed with staff and pt about the plan.  See the orders for further plan.  Will proceed further acc to pt's progress.  Time spent with management of the pt is-20 minutes      Electronically signed by: Lolis Mcmanus MD, on 8/15/2024, at 11:21 AM

## 2024-08-15 NOTE — PROGRESS NOTES
Pt wanting to sign out AMA. Education provided. Dr Danielle updated. AMA paper sign with JANAY Stallings. Electronically signed by Sharyn Kaur RN on 8/15/2024 at 1:56 PM

## 2024-08-16 ENCOUNTER — CARE COORDINATION (OUTPATIENT)
Dept: CASE MANAGEMENT | Age: 39
End: 2024-08-16

## 2024-08-16 NOTE — CARE COORDINATION
Care Transitions Note    Initial Call - Call within 2 business days of discharge: Yes    Attempted to reach patient for transitions of care follow up. Unable to reach patient.    Outreach Attempts:   HIPAA compliant voicemail left for patient.     Patient: Rashad Looney    Patient : 1985   MRN: [id number]    Reason for Admission: Abdominal pain - sent for IV antibiotics d/t abnormal labs.  Discharge Date: 8/15/24  RURS: Readmission Risk Score: 30.8    Last Discharge Facility       Date Complaint Diagnosis Description Type Department Provider    24 Blood Infection Decompensation of cirrhosis of liver (HCC) ... ED to Hosp-Admission (Discharged) (ADMITTED) YVONNETZ ReneeN Lolis Mcmanus MD; Ivis Mccracken...            Was this an external facility discharge? No    Follow Up Appointment:   Patient does not have a follow up appointment scheduled at time of call.  Currently there is no HFU appt scheduled with the PCP.      Plan for follow-up on next business day.      Shani Wild RN BSN  Care Transition Nurse  [phone]

## 2024-08-17 LAB
BACTERIA BLD CULT ORG #2: NORMAL
BACTERIA BLD CULT: NORMAL

## 2024-08-19 ENCOUNTER — CARE COORDINATION (OUTPATIENT)
Dept: CASE MANAGEMENT | Age: 39
End: 2024-08-19

## 2024-08-19 NOTE — CARE COORDINATION
Care Transitions Note    Initial Call - Call within 2 business days of discharge: Yes    Patient Current Location:  Home: [address] [address]    Care Transition Nurse contacted the patient by telephone to perform post hospital discharge assessment, verified name and  as identifiers. Provided introduction to self, and explanation of the Care Transition Nurse role.     Patient: Rashad Looney    Patient : 1985   MRN: [id number]    Reason for Admission: Abdominal Pain - sent due to abnormal labs  Discharge Date: 8/15/24  RURS: Readmission Risk Score: 30.8      Last Discharge Facility       Date Complaint Diagnosis Description Type Department Provider    24 Blood Infection Decompensation of cirrhosis of liver (HCC) ... ED to Hosp-Admission (Discharged) (ADMITTED) MAM[facility] 5N Lolis Mcmanus MD; Ivis Mccracken...            Was this an external facility discharge? No    Additional needs identified to be addressed with provider   No needs identified             Method of communication with provider: none.    Patients top risk factors for readmission: functional physical ability, ineffective coping, lack of knowledge about disease, and medication management    Interventions to address risk factors:   Discussed finding a physician - scheduling an appt - and following through with treatment    Care Summary Note: Rashad states he is \"ok.\"  He states he had a nose bleed yesterday - no other signs of bleeding. Rashad states he has had no alcohol or cocaine since he left the hospital. He states \"I am clean as a whistle.\" He states he continues to have abdominal pain - rates it at 7/10.  Rashad states he has much bruising from his paracentesis site.     Care Transition Nurse reviewed red flags with patient. The patient was given an opportunity to ask questions; all questions answered at this time.. The patient verbalized understanding.   Were discharge instructions available to patient? Yes.   Reviewed

## 2024-08-21 NOTE — PROGRESS NOTES
Physician Progress Note      PATIENT:               SANDRA MCGREGOR  CSN #:                  [id number]  :                       1985  ADMIT DATE:       2024 11:03 PM  DISCH DATE:        8/15/2024 1:55 PM  RESPONDING  PROVIDER #:        Lolis Mcmanus MD          QUERY TEXT:    Pt admitted with Blood Infection. Pt noted to have WBC down to 2.7, RR up to   24, and . If possible, please document in the progress notes and   discharge summary if you are evaluating and /or treating any of the following:    The medical record reflects the following:  Risk Factors: Blood Infection, cirrhosis of liver  Clinical Indicators: WBC down to 2.7, RR up to 24, and   Positive blood culture-  Streptococcus viridans bacteremia.  Treatment:  cefepime, metronidazole, monitor h/h, ID and GI consult    Thank You Arianna Lazo RN, CDS [email or web address]  Options provided:  -- Evolving sepsis due to Streptococcus viridans bacteremia, present on   admission  -- Sepsis due to Streptococcus viridans bacteremia, developed following   admission  -- Streptococcus viridans bacteremia without Sepsis  -- Other - I will add my own diagnosis  -- Disagree - Not applicable / Not valid  -- Disagree - Clinically unable to determine / Unknown  -- Refer to Clinical Documentation Reviewer    PROVIDER RESPONSE TEXT:    This patient has evolving sepsis due to Streptococcus viridans bacteremia   which was present on admission.    Query created by: Arianna Lazo on 2024 3:03 PM      Electronically signed by:  Lolis Mcmanus MD 2024 9:18 PM

## 2024-09-06 ENCOUNTER — OFFICE VISIT (OUTPATIENT)
Dept: INTERNAL MEDICINE CLINIC | Age: 39
End: 2024-09-06
Payer: COMMERCIAL

## 2024-09-06 ENCOUNTER — TELEPHONE (OUTPATIENT)
Dept: INTERNAL MEDICINE CLINIC | Age: 39
End: 2024-09-06

## 2024-09-06 VITALS
WEIGHT: 182 LBS | DIASTOLIC BLOOD PRESSURE: 50 MMHG | OXYGEN SATURATION: 94 % | HEIGHT: 69 IN | SYSTOLIC BLOOD PRESSURE: 106 MMHG | HEART RATE: 74 BPM | BODY MASS INDEX: 26.96 KG/M2

## 2024-09-06 DIAGNOSIS — D53.9 MACROCYTIC ANEMIA: ICD-10-CM

## 2024-09-06 DIAGNOSIS — L98.9 SKIN LESION: ICD-10-CM

## 2024-09-06 DIAGNOSIS — R91.8 LUNG NODULES: ICD-10-CM

## 2024-09-06 DIAGNOSIS — K70.31 ALCOHOLIC CIRRHOSIS OF LIVER WITH ASCITES (HCC): ICD-10-CM

## 2024-09-06 DIAGNOSIS — F10.10 ALCOHOL ABUSE: Primary | ICD-10-CM

## 2024-09-06 DIAGNOSIS — R04.0 EPISTAXIS: ICD-10-CM

## 2024-09-06 DIAGNOSIS — E55.9 VITAMIN D DEFICIENCY: ICD-10-CM

## 2024-09-06 DIAGNOSIS — F41.9 ANXIETY: ICD-10-CM

## 2024-09-06 LAB
25(OH)D3 SERPL-MCNC: 46.6 NG/ML
ALBUMIN SERPL-MCNC: 2.9 G/DL (ref 3.4–5)
ALBUMIN/GLOB SERPL: 0.9 {RATIO} (ref 1.1–2.2)
ALP SERPL-CCNC: 97 U/L (ref 40–129)
ALT SERPL-CCNC: 64 U/L (ref 10–40)
ANION GAP SERPL CALCULATED.3IONS-SCNC: 11 MMOL/L (ref 3–16)
AST SERPL-CCNC: 110 U/L (ref 15–37)
BASOPHILS # BLD: 0 K/UL (ref 0–0.2)
BASOPHILS NFR BLD: 0.4 %
BILIRUB SERPL-MCNC: 6.2 MG/DL (ref 0–1)
BUN SERPL-MCNC: 7 MG/DL (ref 7–20)
CALCIUM SERPL-MCNC: 7.8 MG/DL (ref 8.3–10.6)
CHLORIDE SERPL-SCNC: 99 MMOL/L (ref 99–110)
CO2 SERPL-SCNC: 25 MMOL/L (ref 21–32)
CREAT SERPL-MCNC: <0.5 MG/DL (ref 0.9–1.3)
DEPRECATED RDW RBC AUTO: 14.1 % (ref 12.4–15.4)
EOSINOPHIL # BLD: 0 K/UL (ref 0–0.6)
EOSINOPHIL NFR BLD: 1 %
GFR SERPLBLD CREATININE-BSD FMLA CKD-EPI: >90 ML/MIN/{1.73_M2}
GLUCOSE SERPL-MCNC: 79 MG/DL (ref 70–99)
HCT VFR BLD AUTO: 27.1 % (ref 40.5–52.5)
HGB BLD-MCNC: 9.4 G/DL (ref 13.5–17.5)
INR PPP: 2.02 (ref 0.85–1.15)
LYMPHOCYTES # BLD: 1 K/UL (ref 1–5.1)
LYMPHOCYTES NFR BLD: 34.8 %
MCH RBC QN AUTO: 38.2 PG (ref 26–34)
MCHC RBC AUTO-ENTMCNC: 34.8 G/DL (ref 31–36)
MCV RBC AUTO: 109.6 FL (ref 80–100)
MONOCYTES # BLD: 0.3 K/UL (ref 0–1.3)
MONOCYTES NFR BLD: 12 %
NEUTROPHILS # BLD: 1.4 K/UL (ref 1.7–7.7)
NEUTROPHILS NFR BLD: 51.8 %
PLATELET # BLD AUTO: 44 K/UL (ref 135–450)
PMV BLD AUTO: 9.7 FL (ref 5–10.5)
POTASSIUM SERPL-SCNC: 3.5 MMOL/L (ref 3.5–5.1)
PROT SERPL-MCNC: 6 G/DL (ref 6.4–8.2)
PROTHROMBIN TIME: 22.9 SEC (ref 11.9–14.9)
RBC # BLD AUTO: 2.47 M/UL (ref 4.2–5.9)
SODIUM SERPL-SCNC: 135 MMOL/L (ref 136–145)
WBC # BLD AUTO: 2.8 K/UL (ref 4–11)

## 2024-09-06 PROCEDURE — 1111F DSCHRG MED/CURRENT MED MERGE: CPT | Performed by: NURSE PRACTITIONER

## 2024-09-06 PROCEDURE — G8417 CALC BMI ABV UP PARAM F/U: HCPCS | Performed by: NURSE PRACTITIONER

## 2024-09-06 PROCEDURE — 4004F PT TOBACCO SCREEN RCVD TLK: CPT | Performed by: NURSE PRACTITIONER

## 2024-09-06 PROCEDURE — G8427 DOCREV CUR MEDS BY ELIG CLIN: HCPCS | Performed by: NURSE PRACTITIONER

## 2024-09-06 PROCEDURE — 99214 OFFICE O/P EST MOD 30 MIN: CPT | Performed by: NURSE PRACTITIONER

## 2024-09-06 PROCEDURE — 36415 COLL VENOUS BLD VENIPUNCTURE: CPT | Performed by: NURSE PRACTITIONER

## 2024-09-06 RX ORDER — SPIRONOLACTONE 50 MG/1
50 TABLET, FILM COATED ORAL DAILY
COMMUNITY
Start: 2024-06-30

## 2024-09-06 RX ORDER — LACTULOSE 10 G/15ML
SOLUTION ORAL; RECTAL
COMMUNITY
Start: 2024-08-30

## 2024-09-06 RX ORDER — LANOLIN ALCOHOL/MO/W.PET/CERES
CREAM (GRAM) TOPICAL
COMMUNITY
Start: 2024-08-12

## 2024-09-06 RX ORDER — BACLOFEN 10 MG/1
TABLET ORAL
COMMUNITY
Start: 2024-08-30 | End: 2024-09-06

## 2024-09-06 RX ORDER — BUDESONIDE AND FORMOTEROL FUMARATE DIHYDRATE 160; 4.5 UG/1; UG/1
AEROSOL RESPIRATORY (INHALATION)
COMMUNITY
Start: 2024-08-30

## 2024-09-06 SDOH — HEALTH STABILITY: PHYSICAL HEALTH: ON AVERAGE, HOW MANY DAYS PER WEEK DO YOU ENGAGE IN MODERATE TO STRENUOUS EXERCISE (LIKE A BRISK WALK)?: 3 DAYS

## 2024-09-06 SDOH — ECONOMIC STABILITY: FOOD INSECURITY: WITHIN THE PAST 12 MONTHS, YOU WORRIED THAT YOUR FOOD WOULD RUN OUT BEFORE YOU GOT MONEY TO BUY MORE.: PATIENT DECLINED

## 2024-09-06 SDOH — ECONOMIC STABILITY: FOOD INSECURITY: WITHIN THE PAST 12 MONTHS, THE FOOD YOU BOUGHT JUST DIDN'T LAST AND YOU DIDN'T HAVE MONEY TO GET MORE.: PATIENT DECLINED

## 2024-09-06 SDOH — HEALTH STABILITY: PHYSICAL HEALTH: ON AVERAGE, HOW MANY MINUTES DO YOU ENGAGE IN EXERCISE AT THIS LEVEL?: 10 MIN

## 2024-09-06 SDOH — ECONOMIC STABILITY: INCOME INSECURITY: HOW HARD IS IT FOR YOU TO PAY FOR THE VERY BASICS LIKE FOOD, HOUSING, MEDICAL CARE, AND HEATING?: PATIENT DECLINED

## 2024-09-06 ASSESSMENT — ENCOUNTER SYMPTOMS
NAUSEA: 0
VOMITING: 0
ABDOMINAL PAIN: 1
SHORTNESS OF BREATH: 0

## 2024-09-06 NOTE — PROGRESS NOTES
Date: 9/6/2024                                               Subjective/Objective:     Chief Complaint   Patient presents with    Follow-up       HPI    Rashad Looney is a 38 yo male, visit today for hospital follow up. He is accompanied by his wife, Priyanka, today. Former patient of Dr Mcmanus. Was admitted to OSH 8/20/2024-8/22/2024. Per chart, was admitted for decompensated alcoholic cirrhosis with ascites and large flank hematoma from recent paracentesis.     Liver cirrhosis - is not currently seeing GI. Has appointment at  10/8/2024. Went for outpatient paracentesis last week, small ascites that was not suitable for paracentesis.   Is taking lactulose BID. On spironolactone. He endorses compliance with medications.     Admits he has been buying klonopin off the street to help him not shake since he stopped drinking. He last drank 8/12/2024. He was previously seen by [facility] for h/o heroin use.     Has \"good days and bad days\" since leaving the hospital.   Did have a nose bleed this morning. Was able to stop it. Doesn't usually get these.     Chronic anemia.   Reports history of \"several\" bands in his throat for varices.     CT Chest 8/10/2024 with incidental finding of likely inflammatory lung nodules, recommending 3 month follow up CT.     Was previously on gabapentin, he reports for his abdominal pain, last filled 5/2024, has not taken since then.          Patient Active Problem List    Diagnosis Date Noted    Sepsis (HCC) 08/13/2024    Decompensation of cirrhosis of liver (HCC) 08/13/2024    Alcohol use disorder 08/13/2024    Alcoholic cirrhosis of liver with ascites (HCC) 08/13/2024    Hep C w/o coma, chronic (HCC) 08/13/2024    Elevated INR 08/13/2024    Portal hypertension with esophageal varices (HCC) 08/13/2024    Thrombocytopenia (HCC) 08/13/2024    Bacteremia 08/13/2024    Lactic acid acidosis 08/13/2024    Pancytopenia (HCC) 08/13/2024    Generalized abdominal pain 06/07/2024    Anemia

## 2024-09-06 NOTE — TELEPHONE ENCOUNTER
Patient called asking about medications that were supposed to be sent to [facility] in [place] today. Can you please call pt back or send RX's.     Thank you

## 2024-09-06 NOTE — PATIENT INSTRUCTIONS
CT scan of chest due November 10, 2024 ([phone])  See psychiatry  See GI doctor as scheduled  If any bleeding that does not stop go to ER  I encourage you to re-consider treatment program. Let me know if I can help with this.

## 2024-09-07 RX ORDER — SILDENAFIL 100 MG/1
TABLET, FILM COATED ORAL
Qty: 10 TABLET | Refills: 3 | OUTPATIENT
Start: 2024-09-07

## 2024-09-23 ENCOUNTER — OFFICE VISIT (OUTPATIENT)
Dept: PSYCHIATRY | Age: 39
End: 2024-09-23
Payer: COMMERCIAL

## 2024-09-23 DIAGNOSIS — F41.1 GAD (GENERALIZED ANXIETY DISORDER): Primary | ICD-10-CM

## 2024-09-23 DIAGNOSIS — F32.A DEPRESSION, UNSPECIFIED DEPRESSION TYPE: ICD-10-CM

## 2024-09-23 DIAGNOSIS — F10.10 ALCOHOL ABUSE: ICD-10-CM

## 2024-09-23 PROCEDURE — 4004F PT TOBACCO SCREEN RCVD TLK: CPT | Performed by: REGISTERED NURSE

## 2024-09-23 PROCEDURE — 99205 OFFICE O/P NEW HI 60 MIN: CPT | Performed by: REGISTERED NURSE

## 2024-09-23 PROCEDURE — G8417 CALC BMI ABV UP PARAM F/U: HCPCS | Performed by: REGISTERED NURSE

## 2024-09-23 PROCEDURE — G8428 CUR MEDS NOT DOCUMENT: HCPCS | Performed by: REGISTERED NURSE

## 2024-09-23 RX ORDER — MULTIVIT-MIN/IRON FUM/FOLIC AC 7.5 MG-4
1 TABLET ORAL DAILY
Qty: 30 TABLET | Refills: 2 | Status: SHIPPED | OUTPATIENT
Start: 2024-09-23

## 2024-09-23 RX ORDER — OLANZAPINE 2.5 MG/1
2.5 TABLET, FILM COATED ORAL 2 TIMES DAILY
Qty: 30 TABLET | Refills: 3 | Status: SHIPPED | OUTPATIENT
Start: 2024-09-23

## 2024-09-23 ASSESSMENT — PATIENT HEALTH QUESTIONNAIRE - PHQ9
SUM OF ALL RESPONSES TO PHQ QUESTIONS 1-9: 25
3. TROUBLE FALLING OR STAYING ASLEEP: NEARLY EVERY DAY
8. MOVING OR SPEAKING SO SLOWLY THAT OTHER PEOPLE COULD HAVE NOTICED. OR THE OPPOSITE, BEING SO FIGETY OR RESTLESS THAT YOU HAVE BEEN MOVING AROUND A LOT MORE THAN USUAL: NEARLY EVERY DAY
SUM OF ALL RESPONSES TO PHQ QUESTIONS 1-9: 24
5. POOR APPETITE OR OVEREATING: NEARLY EVERY DAY
1. LITTLE INTEREST OR PLEASURE IN DOING THINGS: NEARLY EVERY DAY
6. FEELING BAD ABOUT YOURSELF - OR THAT YOU ARE A FAILURE OR HAVE LET YOURSELF OR YOUR FAMILY DOWN: NEARLY EVERY DAY
9. THOUGHTS THAT YOU WOULD BE BETTER OFF DEAD, OR OF HURTING YOURSELF: SEVERAL DAYS
SUM OF ALL RESPONSES TO PHQ9 QUESTIONS 1 & 2: 6
2. FEELING DOWN, DEPRESSED OR HOPELESS: NEARLY EVERY DAY
4. FEELING TIRED OR HAVING LITTLE ENERGY: NEARLY EVERY DAY
SUM OF ALL RESPONSES TO PHQ QUESTIONS 1-9: 25
7. TROUBLE CONCENTRATING ON THINGS, SUCH AS READING THE NEWSPAPER OR WATCHING TELEVISION: NEARLY EVERY DAY
SUM OF ALL RESPONSES TO PHQ QUESTIONS 1-9: 25

## 2024-09-23 ASSESSMENT — ANXIETY QUESTIONNAIRES
2. NOT BEING ABLE TO STOP OR CONTROL WORRYING: NEARLY EVERY DAY
7. FEELING AFRAID AS IF SOMETHING AWFUL MIGHT HAPPEN: NEARLY EVERY DAY
1. FEELING NERVOUS, ANXIOUS, OR ON EDGE: NEARLY EVERY DAY
5. BEING SO RESTLESS THAT IT IS HARD TO SIT STILL: NEARLY EVERY DAY
3. WORRYING TOO MUCH ABOUT DIFFERENT THINGS: NEARLY EVERY DAY
6. BECOMING EASILY ANNOYED OR IRRITABLE: NEARLY EVERY DAY
GAD7 TOTAL SCORE: 21
4. TROUBLE RELAXING: NEARLY EVERY DAY

## 2024-09-25 SDOH — HEALTH STABILITY: PHYSICAL HEALTH: ON AVERAGE, HOW MANY DAYS PER WEEK DO YOU ENGAGE IN MODERATE TO STRENUOUS EXERCISE (LIKE A BRISK WALK)?: 4 DAYS

## 2024-09-25 SDOH — HEALTH STABILITY: PHYSICAL HEALTH: ON AVERAGE, HOW MANY MINUTES DO YOU ENGAGE IN EXERCISE AT THIS LEVEL?: 10 MIN

## 2024-09-26 ENCOUNTER — OFFICE VISIT (OUTPATIENT)
Dept: INTERNAL MEDICINE CLINIC | Age: 39
End: 2024-09-26
Payer: COMMERCIAL

## 2024-09-26 VITALS
OXYGEN SATURATION: 94 % | BODY MASS INDEX: 25.92 KG/M2 | WEIGHT: 175 LBS | SYSTOLIC BLOOD PRESSURE: 150 MMHG | HEIGHT: 69 IN | HEART RATE: 93 BPM | DIASTOLIC BLOOD PRESSURE: 72 MMHG

## 2024-09-26 DIAGNOSIS — N52.9 ERECTILE DYSFUNCTION, UNSPECIFIED ERECTILE DYSFUNCTION TYPE: ICD-10-CM

## 2024-09-26 DIAGNOSIS — G89.29 OTHER CHRONIC PAIN: ICD-10-CM

## 2024-09-26 DIAGNOSIS — K70.31 ALCOHOLIC CIRRHOSIS OF LIVER WITH ASCITES (HCC): Primary | ICD-10-CM

## 2024-09-26 DIAGNOSIS — I10 HYPERTENSION, UNSPECIFIED TYPE: ICD-10-CM

## 2024-09-26 PROCEDURE — G8417 CALC BMI ABV UP PARAM F/U: HCPCS | Performed by: STUDENT IN AN ORGANIZED HEALTH CARE EDUCATION/TRAINING PROGRAM

## 2024-09-26 PROCEDURE — 4004F PT TOBACCO SCREEN RCVD TLK: CPT | Performed by: STUDENT IN AN ORGANIZED HEALTH CARE EDUCATION/TRAINING PROGRAM

## 2024-09-26 PROCEDURE — G8427 DOCREV CUR MEDS BY ELIG CLIN: HCPCS | Performed by: STUDENT IN AN ORGANIZED HEALTH CARE EDUCATION/TRAINING PROGRAM

## 2024-09-26 PROCEDURE — 3077F SYST BP >= 140 MM HG: CPT | Performed by: STUDENT IN AN ORGANIZED HEALTH CARE EDUCATION/TRAINING PROGRAM

## 2024-09-26 PROCEDURE — 3078F DIAST BP <80 MM HG: CPT | Performed by: STUDENT IN AN ORGANIZED HEALTH CARE EDUCATION/TRAINING PROGRAM

## 2024-09-26 PROCEDURE — 99215 OFFICE O/P EST HI 40 MIN: CPT | Performed by: STUDENT IN AN ORGANIZED HEALTH CARE EDUCATION/TRAINING PROGRAM

## 2024-09-26 RX ORDER — SILDENAFIL 100 MG/1
TABLET, FILM COATED ORAL
Qty: 10 TABLET | Refills: 0 | Status: SHIPPED | OUTPATIENT
Start: 2024-09-26

## 2024-09-26 ASSESSMENT — ENCOUNTER SYMPTOMS
EYE DISCHARGE: 0
ALLERGIC/IMMUNOLOGIC NEGATIVE: 1
SHORTNESS OF BREATH: 0
CONSTIPATION: 0
EYES NEGATIVE: 1
ABDOMINAL DISTENTION: 1
ABDOMINAL PAIN: 0
RESPIRATORY NEGATIVE: 1

## 2024-10-22 ENCOUNTER — TELEPHONE (OUTPATIENT)
Dept: INTERNAL MEDICINE CLINIC | Age: 39
End: 2024-10-22

## 2024-10-22 NOTE — TELEPHONE ENCOUNTER
He fell the other day, tripped over the dog , and hit on his bedb frame. He hit his chest and now his ribs are hurting really bad on the left side. Would like to know if he can get a x -ray      [phone] please callhim back

## 2024-11-11 DIAGNOSIS — N52.9 ERECTILE DYSFUNCTION, UNSPECIFIED ERECTILE DYSFUNCTION TYPE: ICD-10-CM

## 2024-11-11 RX ORDER — SILDENAFIL 100 MG/1
TABLET, FILM COATED ORAL
Qty: 10 TABLET | Refills: 2 | Status: SHIPPED | OUTPATIENT
Start: 2024-11-11

## 2024-11-11 NOTE — TELEPHONE ENCOUNTER
Future Appointments   Date Time Provider Department Center   11/18/2024 12:40 PM WEST CT RM 1 [facility]   12/5/2024  3:00 PM Dariel Castaneda, APRN - CNP [place] [facility]   1/3/2025 10:15 AM Praveen Brink MD [facility] ECC DEP       Last appt 9/26/24

## 2024-11-18 ENCOUNTER — HOSPITAL ENCOUNTER (OUTPATIENT)
Dept: CT IMAGING | Age: 39
Discharge: HOME | End: 2024-11-18
Payer: COMMERCIAL

## 2024-11-18 DIAGNOSIS — R91.8 LUNG NODULES: ICD-10-CM

## 2024-11-18 PROCEDURE — 71250 CT THORAX DX C-: CPT

## 2024-11-19 ENCOUNTER — TELEPHONE (OUTPATIENT)
Dept: INTERNAL MEDICINE CLINIC | Age: 39
End: 2024-11-19

## 2024-11-19 NOTE — TELEPHONE ENCOUNTER
[facility] called, they to know where the prior CT chest was done so they can get those results to compare    [phone]    Thank you

## 2024-12-05 ENCOUNTER — CLINICAL DOCUMENTATION (OUTPATIENT)
Dept: PSYCHIATRY | Age: 39
End: 2024-12-05

## 2024-12-05 NOTE — PROGRESS NOTES
Patient had NCNS for f/u appointment on 12/5/2024 with integrated behavioral [facility]. Patient needs to make f/u appointment before refill for any psychotropic medications will be done.

## 2025-03-23 ENCOUNTER — APPOINTMENT (OUTPATIENT)
Dept: GENERAL RADIOLOGY | Age: 40
DRG: 251 | End: 2025-03-23
Payer: COMMERCIAL

## 2025-03-23 ENCOUNTER — HOSPITAL ENCOUNTER (INPATIENT)
Age: 40
LOS: 3 days | Discharge: HOME | DRG: 251 | End: 2025-03-26
Attending: INTERNAL MEDICINE | Admitting: INTERNAL MEDICINE
Payer: COMMERCIAL

## 2025-03-23 ENCOUNTER — APPOINTMENT (OUTPATIENT)
Dept: CT IMAGING | Age: 40
DRG: 251 | End: 2025-03-23
Payer: COMMERCIAL

## 2025-03-23 DIAGNOSIS — K70.30 ALCOHOLIC CIRRHOSIS OF LIVER WITHOUT ASCITES (HCC): ICD-10-CM

## 2025-03-23 DIAGNOSIS — K80.20 CALCULUS OF GALLBLADDER WITHOUT CHOLECYSTITIS WITHOUT OBSTRUCTION: ICD-10-CM

## 2025-03-23 DIAGNOSIS — K85.90 ACUTE PANCREATITIS WITHOUT INFECTION OR NECROSIS, UNSPECIFIED PANCREATITIS TYPE: Primary | ICD-10-CM

## 2025-03-23 LAB
ALBUMIN SERPL-MCNC: 3.3 G/DL (ref 3.4–5)
ALP SERPL-CCNC: 102 U/L (ref 40–129)
ALT SERPL-CCNC: 88 U/L (ref 10–40)
AMMONIA PLAS-SCNC: 76 UMOL/L (ref 16–60)
ANION GAP SERPL CALCULATED.3IONS-SCNC: 11 MMOL/L (ref 3–16)
AST SERPL-CCNC: 112 U/L (ref 15–37)
BASE EXCESS BLDV CALC-SCNC: -0.9 MMOL/L
BASOPHILS # BLD: 0 K/UL (ref 0–0.2)
BASOPHILS NFR BLD: 1.1 %
BILIRUB DIRECT SERPL-MCNC: 2.8 MG/DL (ref 0–0.3)
BILIRUB INDIRECT SERPL-MCNC: 1.5 MG/DL (ref 0–1)
BILIRUB SERPL-MCNC: 4.3 MG/DL (ref 0–1)
BILIRUB UR QL STRIP.AUTO: ABNORMAL
BUN SERPL-MCNC: 6 MG/DL (ref 7–20)
CALCIUM SERPL-MCNC: 8.9 MG/DL (ref 8.3–10.6)
CHLORIDE SERPL-SCNC: 113 MMOL/L (ref 99–110)
CLARITY UR: CLEAR
CO2 BLDV-SCNC: 23 MMOL/L
CO2 SERPL-SCNC: 21 MMOL/L (ref 21–32)
COHGB MFR BLDV: 3.6 %
COLOR UR: ABNORMAL
CREAT SERPL-MCNC: 0.7 MG/DL (ref 0.9–1.3)
DEPRECATED RDW RBC AUTO: 14 % (ref 12.4–15.4)
EOSINOPHIL # BLD: 0.1 K/UL (ref 0–0.6)
EOSINOPHIL NFR BLD: 5.5 %
ETHANOLAMINE SERPL-MCNC: NORMAL MG/DL (ref 0–0.08)
GFR SERPLBLD CREATININE-BSD FMLA CKD-EPI: >90 ML/MIN/{1.73_M2}
GLUCOSE SERPL-MCNC: 126 MG/DL (ref 70–99)
GLUCOSE UR STRIP.AUTO-MCNC: NEGATIVE MG/DL
HCO3 BLDV-SCNC: 22 MMOL/L (ref 23–29)
HCT VFR BLD AUTO: 29.5 % (ref 40.5–52.5)
HGB BLD-MCNC: 10.5 G/DL (ref 13.5–17.5)
HGB UR QL STRIP.AUTO: NEGATIVE
INR PPP: 1.73 (ref 0.85–1.15)
KETONES UR STRIP.AUTO-MCNC: ABNORMAL MG/DL
LEUKOCYTE ESTERASE UR QL STRIP.AUTO: NEGATIVE
LIPASE SERPL-CCNC: 120 U/L (ref 13–60)
LYMPHOCYTES # BLD: 0.6 K/UL (ref 1–5.1)
LYMPHOCYTES NFR BLD: 25.2 %
MCH RBC QN AUTO: 36.3 PG (ref 26–34)
MCHC RBC AUTO-ENTMCNC: 35.6 G/DL (ref 31–36)
MCV RBC AUTO: 101.9 FL (ref 80–100)
METHGB MFR BLDV: 0.5 %
MONOCYTES # BLD: 0.3 K/UL (ref 0–1.3)
MONOCYTES NFR BLD: 15.1 %
NEUTROPHILS # BLD: 1.2 K/UL (ref 1.7–7.7)
NEUTROPHILS NFR BLD: 53.1 %
NITRITE UR QL STRIP.AUTO: NEGATIVE
O2 THERAPY: ABNORMAL
PCO2 BLDV: 31.4 MMHG (ref 40–50)
PH BLDV: 7.46 [PH] (ref 7.35–7.45)
PH UR STRIP.AUTO: 6.5 [PH] (ref 5–8)
PLATELET # BLD AUTO: 61 K/UL (ref 135–450)
PMV BLD AUTO: 9.2 FL (ref 5–10.5)
PO2 BLDV: 51 MMHG
POTASSIUM SERPL-SCNC: 3.6 MMOL/L (ref 3.5–5.1)
PROT SERPL-MCNC: 6.6 G/DL (ref 6.4–8.2)
PROT UR STRIP.AUTO-MCNC: NEGATIVE MG/DL
PROTHROMBIN TIME: 20.4 SEC (ref 11.9–14.9)
RBC # BLD AUTO: 2.89 M/UL (ref 4.2–5.9)
SAO2 % BLDV: 88 %
SARS-COV-2 RDRP RESP QL NAA+PROBE: NOT DETECTED
SODIUM SERPL-SCNC: 145 MMOL/L (ref 136–145)
SP GR UR STRIP.AUTO: 1.02 (ref 1–1.03)
UA COMPLETE W REFLEX CULTURE PNL UR: ABNORMAL
UA DIPSTICK W REFLEX MICRO PNL UR: ABNORMAL
URN SPEC COLLECT METH UR: ABNORMAL
UROBILINOGEN UR STRIP-ACNC: >=8 E.U./DL
WBC # BLD AUTO: 2.3 K/UL (ref 4–11)

## 2025-03-23 PROCEDURE — 6370000000 HC RX 637 (ALT 250 FOR IP): Performed by: INTERNAL MEDICINE

## 2025-03-23 PROCEDURE — 82140 ASSAY OF AMMONIA: CPT

## 2025-03-23 PROCEDURE — 85025 COMPLETE CBC W/AUTO DIFF WBC: CPT

## 2025-03-23 PROCEDURE — 2500000003 HC RX 250 WO HCPCS: Performed by: INTERNAL MEDICINE

## 2025-03-23 PROCEDURE — 83690 ASSAY OF LIPASE: CPT

## 2025-03-23 PROCEDURE — 87635 SARS-COV-2 COVID-19 AMP PRB: CPT

## 2025-03-23 PROCEDURE — 36415 COLL VENOUS BLD VENIPUNCTURE: CPT

## 2025-03-23 PROCEDURE — 6360000002 HC RX W HCPCS: Performed by: PHYSICIAN ASSISTANT

## 2025-03-23 PROCEDURE — 85610 PROTHROMBIN TIME: CPT

## 2025-03-23 PROCEDURE — 1200000000 HC SEMI PRIVATE

## 2025-03-23 PROCEDURE — 74177 CT ABD & PELVIS W/CONTRAST: CPT

## 2025-03-23 PROCEDURE — 82803 BLOOD GASES ANY COMBINATION: CPT

## 2025-03-23 PROCEDURE — 82077 ASSAY SPEC XCP UR&BREATH IA: CPT

## 2025-03-23 PROCEDURE — 2580000003 HC RX 258: Performed by: INTERNAL MEDICINE

## 2025-03-23 PROCEDURE — 6360000002 HC RX W HCPCS: Performed by: INTERNAL MEDICINE

## 2025-03-23 PROCEDURE — 80048 BASIC METABOLIC PNL TOTAL CA: CPT

## 2025-03-23 PROCEDURE — 81003 URINALYSIS AUTO W/O SCOPE: CPT

## 2025-03-23 PROCEDURE — 71045 X-RAY EXAM CHEST 1 VIEW: CPT

## 2025-03-23 PROCEDURE — 80076 HEPATIC FUNCTION PANEL: CPT

## 2025-03-23 PROCEDURE — 6360000004 HC RX CONTRAST MEDICATION: Performed by: PHYSICIAN ASSISTANT

## 2025-03-23 PROCEDURE — 96374 THER/PROPH/DIAG INJ IV PUSH: CPT

## 2025-03-23 PROCEDURE — 96375 TX/PRO/DX INJ NEW DRUG ADDON: CPT

## 2025-03-23 PROCEDURE — 93005 ELECTROCARDIOGRAM TRACING: CPT | Performed by: PHYSICIAN ASSISTANT

## 2025-03-23 PROCEDURE — 99285 EMERGENCY DEPT VISIT HI MDM: CPT

## 2025-03-23 RX ORDER — DEXTROSE MONOHYDRATE AND SODIUM CHLORIDE 5; .9 G/100ML; G/100ML
INJECTION, SOLUTION INTRAVENOUS CONTINUOUS
Status: DISCONTINUED | OUTPATIENT
Start: 2025-03-23 | End: 2025-03-24

## 2025-03-23 RX ORDER — IOPAMIDOL 755 MG/ML
75 INJECTION, SOLUTION INTRAVASCULAR
Status: COMPLETED | OUTPATIENT
Start: 2025-03-23 | End: 2025-03-23

## 2025-03-23 RX ORDER — MORPHINE SULFATE 10 MG/ML
6 INJECTION, SOLUTION INTRAMUSCULAR; INTRAVENOUS ONCE
Status: COMPLETED | OUTPATIENT
Start: 2025-03-23 | End: 2025-03-23

## 2025-03-23 RX ORDER — ONDANSETRON 2 MG/ML
4 INJECTION INTRAMUSCULAR; INTRAVENOUS ONCE
Status: COMPLETED | OUTPATIENT
Start: 2025-03-23 | End: 2025-03-23

## 2025-03-23 RX ORDER — OXYCODONE HYDROCHLORIDE 5 MG/1
5 TABLET ORAL EVERY 4 HOURS PRN
Refills: 0 | Status: DISCONTINUED | OUTPATIENT
Start: 2025-03-23 | End: 2025-03-26 | Stop reason: HOSPADM

## 2025-03-23 RX ADMIN — DEXTROSE AND SODIUM CHLORIDE: 5; 900 INJECTION, SOLUTION INTRAVENOUS at 16:01

## 2025-03-23 RX ADMIN — IOPAMIDOL 75 ML: 755 INJECTION, SOLUTION INTRAVENOUS at 12:48

## 2025-03-23 RX ADMIN — OXYCODONE 5 MG: 5 TABLET ORAL at 15:56

## 2025-03-23 RX ADMIN — OXYCODONE 5 MG: 5 TABLET ORAL at 20:45

## 2025-03-23 RX ADMIN — HYDROMORPHONE HYDROCHLORIDE 0.5 MG: 1 INJECTION, SOLUTION INTRAMUSCULAR; INTRAVENOUS; SUBCUTANEOUS at 22:03

## 2025-03-23 RX ADMIN — ONDANSETRON 4 MG: 2 INJECTION, SOLUTION INTRAMUSCULAR; INTRAVENOUS at 13:14

## 2025-03-23 RX ADMIN — SODIUM CHLORIDE, PRESERVATIVE FREE 40 MG: 5 INJECTION INTRAVENOUS at 15:55

## 2025-03-23 RX ADMIN — MORPHINE SULFATE 6 MG: 10 INJECTION INTRAVENOUS at 13:16

## 2025-03-23 ASSESSMENT — PAIN SCALES - GENERAL
PAINLEVEL_OUTOF10: 8
PAINLEVEL_OUTOF10: 4
PAINLEVEL_OUTOF10: 7
PAINLEVEL_OUTOF10: 8
PAINLEVEL_OUTOF10: 5
PAINLEVEL_OUTOF10: 4

## 2025-03-23 ASSESSMENT — PAIN - FUNCTIONAL ASSESSMENT
PAIN_FUNCTIONAL_ASSESSMENT: ACTIVITIES ARE NOT PREVENTED
PAIN_FUNCTIONAL_ASSESSMENT: ACTIVITIES ARE NOT PREVENTED
PAIN_FUNCTIONAL_ASSESSMENT: 0-10
PAIN_FUNCTIONAL_ASSESSMENT: ACTIVITIES ARE NOT PREVENTED

## 2025-03-23 ASSESSMENT — PAIN DESCRIPTION - DESCRIPTORS
DESCRIPTORS: ACHING;DISCOMFORT
DESCRIPTORS: ACHING;BURNING;SHOOTING
DESCRIPTORS: ACHING
DESCRIPTORS: DISCOMFORT

## 2025-03-23 ASSESSMENT — PAIN DESCRIPTION - LOCATION
LOCATION: ABDOMEN

## 2025-03-23 ASSESSMENT — PAIN DESCRIPTION - PAIN TYPE
TYPE: ACUTE PAIN
TYPE: ACUTE PAIN;CHRONIC PAIN
TYPE: ACUTE PAIN

## 2025-03-23 ASSESSMENT — PAIN DESCRIPTION - ORIENTATION
ORIENTATION: RIGHT;UPPER
ORIENTATION: MID;UPPER
ORIENTATION: RIGHT;UPPER;MID
ORIENTATION: RIGHT

## 2025-03-23 NOTE — PROGRESS NOTES
Pt arrived to 4112 from ED. VSS. Oriented to room & call light. Bed in lowest position with wheels locked, bed alarm on. Pt reporting 8/10 abd pain, meds given per MAR. No other needs expressed at this time. Call light within reach.

## 2025-03-23 NOTE — ED NOTES
ED TO INPATIENT SBAR HANDOFF    Patient Name: Rashad Looney   Preferred Name: Orlin  : 1985  40 y.o.   Family/Caregiver Present: no   Code Status Order: Full Code  PO Status: NPO:Yes  Telemetry Order:   C-SSRS: Risk of Suicide: No Risk  Sitter no     Restraints:     Sepsis Risk Score      Situation  Chief Complaint   Patient presents with    Epistaxis     Patient states every day nose bleed x 4-5 days. Reports sometimes 5 mins sometimes 30 mins. Patient is end stage liver failure. Very jaudice. Reports right upper quad pain for \"awhile but intense this week\". Patient reports dizziness.      Brief Description of Patient's Condition: acute pancreatitis, alcoholic cirrhosis, calculus of gallbladder  Mental Status: oriented and alert  Arrived from:Home  Imaging:   CT ABDOMEN PELVIS W IV CONTRAST Additional Contrast? None   Final Result   1. Moderate left pleural effusion and compressive atelectasis, new compared   to the prior study..   2. Cholelithiasis.   3. Mild diffuse mucosal thickening within the hepatic flexure and ascending   colon. This may be related to portal hypertension, colitis should also be   considered..   4. Stable  nodularity to the liver which may reflect underlying cirrhosis,   associated splenic enlargement and upper abdominal varicosities.   5. Minimal mesenteric stranding in the upper abdomen, around the pancreas and   within the small bowel mesentery. No focal fluid collections, and resolution   of the previous ascites.         XR CHEST PORTABLE   Final Result   No acute process.           Abnormal labs:   Abnormal Labs Reviewed   CBC WITH AUTO DIFFERENTIAL - Abnormal; Notable for the following components:       Result Value    WBC 2.3 (*)     RBC 2.89 (*)     Hemoglobin 10.5 (*)     Hematocrit 29.5 (*)     .9 (*)     MCH 36.3 (*)     Platelets 61 (*)     Neutrophils Absolute 1.2 (*)     Lymphocytes Absolute 0.6 (*)     All other components within normal limits   BASIC METABOLIC

## 2025-03-23 NOTE — ED TRIAGE NOTES
Patient states every day nose bleed x 4-5 days. Reports sometimes 5 mins sometimes 30 mins. Patient is end stage liver failure. Very jaudice. Reports right upper quad pain for \"awhile but intense this week\". Patient reports dizziness.

## 2025-03-23 NOTE — ED PROVIDER NOTES
GABAPENTIN (NEURONTIN) 300 MG CAPSULE    TAKE ONE CAPSULE BY MOUTH THREE TIMES A DAY IN THE MORNING, AT NOON, AND AT BEDTIME    HANDICAP PLACARD MISC    by Does not apply route 1 year    LACTULOSE ENCEPHALOPATHY 10 GM/15ML SOLN SOLUTION        MIRTAZAPINE (REMERON) 45 MG TABLET    Take by mouth nightly    MOMETASONE-FORMOTEROL (DULERA) 100-5 MCG/ACT INHALER    INHALE 2 PUFFS BY MOUTH TWICE A DAY    MULTIPLE VITAMINS-MINERALS (MULTIVITAMIN WITH MINERALS) TABLET    Take 1 tablet by mouth daily    NUTRITIONAL SUPPLEMENTS (BOOST HIGH PROTEIN) LIQD    Take 237 mLs by mouth 2 times daily    OLANZAPINE (ZYPREXA) 2.5 MG TABLET    Take 1 tablet by mouth in the morning and at bedtime    OMEPRAZOLE (PRILOSEC) 40 MG DELAYED RELEASE CAPSULE    TAKE 1 CAPSULE BY MOUTH EVERY MORNING BEFORE BREAKFAST    SILDENAFIL (VIAGRA) 100 MG TABLET    TAKE 1 TABLET BY MOUTH DAILY AS NEEDED    SPIRONOLACTONE (ALDACTONE) 50 MG TABLET    Take 1 tablet by mouth daily    SYMBICORT 160-4.5 MCG/ACT AERO        THIAMINE 100 MG TABLET        TRAMADOL (ULTRAM) 50 MG TABLET    Take 1 tablet by mouth 2 times daily as needed for Pain for up to 30 days. Intended supply: 3 days. Take lowest dose possible to manage pain Max Daily Amount: 100 mg    VITAMIN B-1 (THIAMINE) 100 MG TABLET    Take 1 tablet by mouth daily    VITAMIN D (CHOLECALCIFEROL) 25 MCG (1000 UT) TABS TABLET    Take 1 tablet by mouth daily       ALLERGIES     Codeine    FAMILYHISTORY     History reviewed. No pertinent family history.     SOCIAL HISTORY       Social History     Tobacco Use    Smoking status: Every Day     Current packs/day: 0.50     Types: Cigarettes    Smokeless tobacco: Never   Vaping Use    Vaping status: Never Used   Substance Use Topics    Alcohol use: Yes     Comment: drank three to four day ago. 26 drinks/day per significant other before mid way last week    Drug use: Not Currently     Comment: suboxon       SCREENINGS      Fosters Coma Scale  Eye Opening:  Spontaneous  Best Verbal Response: Oriented  Best Motor Response: Obeys commands  Raf Coma Scale Score: 15           CIWA Assessment  BP: 115/66  Pulse: 77          PHYSICAL EXAM    (up to 7 for level 4, 8 or more for level 5)     ED Triage Vitals [03/23/25 1053]   BP Systolic BP Percentile Diastolic BP Percentile Temp Temp Source Pulse Respirations SpO2   130/71 -- -- 98.1 °F (36.7 °C) Oral 86 16 100 %      Height Weight - Scale         1.753 m (5' 9\") 74.4 kg (164 lb 0.4 oz)             Physical Exam  Vitals and nursing note reviewed.   Constitutional:       General: He is not in acute distress.     Appearance: Normal appearance. He is not ill-appearing.   HENT:      Head: Normocephalic and atraumatic.      Nose: Nose normal.   Eyes:      General:         Right eye: No discharge.         Left eye: No discharge.   Cardiovascular:      Rate and Rhythm: Normal rate and regular rhythm.      Heart sounds: Normal heart sounds.   Pulmonary:      Effort: Pulmonary effort is normal. No respiratory distress.      Breath sounds: Normal breath sounds. No stridor. No wheezing, rhonchi or rales.   Abdominal:      General: Bowel sounds are normal. There is no distension.      Palpations: Abdomen is soft. There is no mass.      Tenderness: There is abdominal tenderness (Worst in the right upper quadrant, also present in the epigastric and periumbilical areas). There is no guarding or rebound.   Musculoskeletal:         General: Normal range of motion.      Cervical back: Normal range of motion.   Skin:     General: Skin is warm and dry.      Comments: Appears jaundiced.   Neurological:      General: No focal deficit present.      Mental Status: He is alert and oriented to person, place, and time.   Psychiatric:         Mood and Affect: Mood normal.         Behavior: Behavior normal.         DIAGNOSTIC RESULTS   LABS:    Labs Reviewed   CBC WITH AUTO DIFFERENTIAL - Abnormal; Notable for the following components:       Result

## 2025-03-23 NOTE — PROGRESS NOTES
4 Eyes Skin Assessment     NAME:  Rashad Looney  YOB: 1985  MEDICAL RECORD NUMBER:  [id number]    The patient is being assessed for  Admission    I agree that at least one RN has performed a thorough Head to Toe Skin Assessment on the patient. ALL assessment sites listed below have been assessed.      Areas assessed by both nurses:    Head, Face, Ears, Shoulders, Back, Chest, Arms, Elbows, Hands, Sacrum. Buttock, Coccyx, Ischium, and Legs. Feet and Heels        Does the Patient have a Wound? No noted wound(s)       Wing Prevention initiated by RN: No  Wound Care Orders initiated by RN: No    Pressure Injury (Stage 3,4, Unstageable, DTI, NWPT, and Complex wounds) if present, place Wound referral order by RN under : No    New Ostomies, if present place, Ostomy referral order under : No     Nurse 1 eSignature: Electronically signed by Nguyen Weston RN on 3/23/25 at 4:25 PM EDT    **SHARE this note so that the co-signing nurse can place an eSignature**    Nurse 2 eSignature: Electronically signed by Suyapa Ponce RN on 3/23/25 at 4:29 PM EDT

## 2025-03-24 LAB
ALBUMIN SERPL-MCNC: 2.9 G/DL (ref 3.4–5)
ALBUMIN/GLOB SERPL: 1 {RATIO} (ref 1.1–2.2)
ALP SERPL-CCNC: 97 U/L (ref 40–129)
ALT SERPL-CCNC: 85 U/L (ref 10–40)
AMMONIA PLAS-SCNC: 37 UMOL/L (ref 16–60)
ANION GAP SERPL CALCULATED.3IONS-SCNC: 9 MMOL/L (ref 3–16)
AST SERPL-CCNC: 126 U/L (ref 15–37)
BILIRUB SERPL-MCNC: 3.5 MG/DL (ref 0–1)
BUN SERPL-MCNC: 5 MG/DL (ref 7–20)
CALCIUM SERPL-MCNC: 7.9 MG/DL (ref 8.3–10.6)
CHLORIDE SERPL-SCNC: 109 MMOL/L (ref 99–110)
CO2 SERPL-SCNC: 18 MMOL/L (ref 21–32)
CREAT SERPL-MCNC: 0.6 MG/DL (ref 0.9–1.3)
DEPRECATED RDW RBC AUTO: 14.1 % (ref 12.4–15.4)
EKG ATRIAL RATE: 79 BPM
EKG DIAGNOSIS: NORMAL
EKG P AXIS: 16 DEGREES
EKG P-R INTERVAL: 140 MS
EKG Q-T INTERVAL: 406 MS
EKG QRS DURATION: 88 MS
EKG QTC CALCULATION (BAZETT): 465 MS
EKG R AXIS: 19 DEGREES
EKG T AXIS: 24 DEGREES
EKG VENTRICULAR RATE: 79 BPM
GFR SERPLBLD CREATININE-BSD FMLA CKD-EPI: >90 ML/MIN/{1.73_M2}
GLUCOSE SERPL-MCNC: 129 MG/DL (ref 70–99)
HCT VFR BLD AUTO: 26.3 % (ref 40.5–52.5)
HGB BLD-MCNC: 9.6 G/DL (ref 13.5–17.5)
LIPASE SERPL-CCNC: 71 U/L (ref 13–60)
MCH RBC QN AUTO: 36.7 PG (ref 26–34)
MCHC RBC AUTO-ENTMCNC: 36.3 G/DL (ref 31–36)
MCV RBC AUTO: 101.1 FL (ref 80–100)
PATH INTERP BLD-IMP: NO
PLATELET # BLD AUTO: 52 K/UL (ref 135–450)
PMV BLD AUTO: 8.8 FL (ref 5–10.5)
POTASSIUM SERPL-SCNC: 3.5 MMOL/L (ref 3.5–5.1)
PROT SERPL-MCNC: 5.7 G/DL (ref 6.4–8.2)
RBC # BLD AUTO: 2.6 M/UL (ref 4.2–5.9)
SODIUM SERPL-SCNC: 136 MMOL/L (ref 136–145)
WBC # BLD AUTO: 1.8 K/UL (ref 4–11)

## 2025-03-24 PROCEDURE — 94640 AIRWAY INHALATION TREATMENT: CPT

## 2025-03-24 PROCEDURE — 2500000003 HC RX 250 WO HCPCS: Performed by: INTERNAL MEDICINE

## 2025-03-24 PROCEDURE — 6360000002 HC RX W HCPCS: Performed by: INTERNAL MEDICINE

## 2025-03-24 PROCEDURE — 94761 N-INVAS EAR/PLS OXIMETRY MLT: CPT

## 2025-03-24 PROCEDURE — 85027 COMPLETE CBC AUTOMATED: CPT

## 2025-03-24 PROCEDURE — 6370000000 HC RX 637 (ALT 250 FOR IP): Performed by: INTERNAL MEDICINE

## 2025-03-24 PROCEDURE — 2580000003 HC RX 258: Performed by: INTERNAL MEDICINE

## 2025-03-24 PROCEDURE — 83690 ASSAY OF LIPASE: CPT

## 2025-03-24 PROCEDURE — 36415 COLL VENOUS BLD VENIPUNCTURE: CPT

## 2025-03-24 PROCEDURE — 1200000000 HC SEMI PRIVATE

## 2025-03-24 PROCEDURE — 93010 ELECTROCARDIOGRAM REPORT: CPT | Performed by: INTERNAL MEDICINE

## 2025-03-24 PROCEDURE — 99223 1ST HOSP IP/OBS HIGH 75: CPT | Performed by: INTERNAL MEDICINE

## 2025-03-24 PROCEDURE — 82140 ASSAY OF AMMONIA: CPT

## 2025-03-24 PROCEDURE — 80053 COMPREHEN METABOLIC PANEL: CPT

## 2025-03-24 RX ORDER — LACTULOSE 10 G/15ML
20 SOLUTION ORAL 3 TIMES DAILY
Status: DISCONTINUED | OUTPATIENT
Start: 2025-03-24 | End: 2025-03-26 | Stop reason: HOSPADM

## 2025-03-24 RX ORDER — BUDESONIDE AND FORMOTEROL FUMARATE DIHYDRATE 160; 4.5 UG/1; UG/1
2 AEROSOL RESPIRATORY (INHALATION)
Status: DISCONTINUED | OUTPATIENT
Start: 2025-03-24 | End: 2025-03-26 | Stop reason: HOSPADM

## 2025-03-24 RX ORDER — FOLIC ACID 1 MG/1
1 TABLET ORAL DAILY
Status: DISCONTINUED | OUTPATIENT
Start: 2025-03-24 | End: 2025-03-26 | Stop reason: HOSPADM

## 2025-03-24 RX ORDER — UBIDECARENONE 75 MG
100 CAPSULE ORAL DAILY
Status: DISCONTINUED | OUTPATIENT
Start: 2025-03-24 | End: 2025-03-26 | Stop reason: HOSPADM

## 2025-03-24 RX ORDER — GABAPENTIN 300 MG/1
300 CAPSULE ORAL 3 TIMES DAILY
Status: DISCONTINUED | OUTPATIENT
Start: 2025-03-24 | End: 2025-03-24

## 2025-03-24 RX ORDER — POTASSIUM CHLORIDE 7.45 MG/ML
10 INJECTION INTRAVENOUS PRN
Status: DISCONTINUED | OUTPATIENT
Start: 2025-03-24 | End: 2025-03-26 | Stop reason: HOSPADM

## 2025-03-24 RX ORDER — GAUZE BANDAGE 2" X 2"
100 BANDAGE TOPICAL DAILY
Status: DISCONTINUED | OUTPATIENT
Start: 2025-03-24 | End: 2025-03-26 | Stop reason: HOSPADM

## 2025-03-24 RX ORDER — CARVEDILOL 3.12 MG/1
3.12 TABLET ORAL 2 TIMES DAILY WITH MEALS
Status: ON HOLD | COMMUNITY
End: 2025-03-26 | Stop reason: HOSPADM

## 2025-03-24 RX ORDER — SODIUM CHLORIDE AND POTASSIUM CHLORIDE 150; 900 MG/100ML; MG/100ML
INJECTION, SOLUTION INTRAVENOUS CONTINUOUS
Status: DISCONTINUED | OUTPATIENT
Start: 2025-03-24 | End: 2025-03-26 | Stop reason: HOSPADM

## 2025-03-24 RX ORDER — LORAZEPAM 0.5 MG/1
0.5 TABLET ORAL NIGHTLY PRN
Status: DISCONTINUED | OUTPATIENT
Start: 2025-03-24 | End: 2025-03-26 | Stop reason: HOSPADM

## 2025-03-24 RX ORDER — CARVEDILOL 3.12 MG/1
3.12 TABLET ORAL 2 TIMES DAILY WITH MEALS
Status: DISCONTINUED | OUTPATIENT
Start: 2025-03-24 | End: 2025-03-24

## 2025-03-24 RX ORDER — NICOTINE 21 MG/24HR
1 PATCH, TRANSDERMAL 24 HOURS TRANSDERMAL DAILY
Status: DISCONTINUED | OUTPATIENT
Start: 2025-03-24 | End: 2025-03-26 | Stop reason: HOSPADM

## 2025-03-24 RX ORDER — SPIRONOLACTONE 100 MG/1
100 TABLET, FILM COATED ORAL DAILY
Status: DISCONTINUED | OUTPATIENT
Start: 2025-03-24 | End: 2025-03-24

## 2025-03-24 RX ORDER — SPIRONOLACTONE 25 MG/1
50 TABLET ORAL DAILY
Status: DISCONTINUED | OUTPATIENT
Start: 2025-03-25 | End: 2025-03-26 | Stop reason: HOSPADM

## 2025-03-24 RX ADMIN — HYDROMORPHONE HYDROCHLORIDE 0.5 MG: 1 INJECTION, SOLUTION INTRAMUSCULAR; INTRAVENOUS; SUBCUTANEOUS at 19:02

## 2025-03-24 RX ADMIN — FOLIC ACID 1 MG: 1 TABLET ORAL at 11:26

## 2025-03-24 RX ADMIN — LORAZEPAM 0.5 MG: 0.5 TABLET ORAL at 20:53

## 2025-03-24 RX ADMIN — DEXTROSE AND SODIUM CHLORIDE: 5; 900 INJECTION, SOLUTION INTRAVENOUS at 11:35

## 2025-03-24 RX ADMIN — CARVEDILOL 3.12 MG: 3.12 TABLET, FILM COATED ORAL at 11:26

## 2025-03-24 RX ADMIN — SODIUM CHLORIDE, PRESERVATIVE FREE 40 MG: 5 INJECTION INTRAVENOUS at 08:50

## 2025-03-24 RX ADMIN — LACTULOSE 20 G: 10 SOLUTION ORAL at 13:50

## 2025-03-24 RX ADMIN — LACTULOSE 20 G: 10 SOLUTION ORAL at 20:53

## 2025-03-24 RX ADMIN — SPIRONOLACTONE 100 MG: 100 TABLET ORAL at 11:26

## 2025-03-24 RX ADMIN — HYDROMORPHONE HYDROCHLORIDE 0.5 MG: 1 INJECTION, SOLUTION INTRAMUSCULAR; INTRAVENOUS; SUBCUTANEOUS at 05:49

## 2025-03-24 RX ADMIN — OXYCODONE 5 MG: 5 TABLET ORAL at 01:35

## 2025-03-24 RX ADMIN — DEXTROSE AND SODIUM CHLORIDE: 5; 900 INJECTION, SOLUTION INTRAVENOUS at 01:32

## 2025-03-24 RX ADMIN — Medication 100 MCG: at 11:26

## 2025-03-24 RX ADMIN — HYDROMORPHONE HYDROCHLORIDE 0.5 MG: 1 INJECTION, SOLUTION INTRAMUSCULAR; INTRAVENOUS; SUBCUTANEOUS at 11:34

## 2025-03-24 RX ADMIN — Medication 100 MG: at 11:26

## 2025-03-24 RX ADMIN — POTASSIUM CHLORIDE AND SODIUM CHLORIDE: 900; 150 INJECTION, SOLUTION INTRAVENOUS at 15:14

## 2025-03-24 RX ADMIN — RIFAXIMIN 550 MG: 550 TABLET ORAL at 20:53

## 2025-03-24 RX ADMIN — OXYCODONE 5 MG: 5 TABLET ORAL at 08:50

## 2025-03-24 RX ADMIN — MIRTAZAPINE 45 MG: 30 TABLET, FILM COATED ORAL at 20:53

## 2025-03-24 RX ADMIN — Medication 2 PUFF: at 19:43

## 2025-03-24 RX ADMIN — RIFAXIMIN 550 MG: 550 TABLET ORAL at 11:29

## 2025-03-24 RX ADMIN — OXYCODONE 5 MG: 5 TABLET ORAL at 20:58

## 2025-03-24 ASSESSMENT — PAIN DESCRIPTION - ORIENTATION
ORIENTATION: RIGHT;LEFT;MID
ORIENTATION: RIGHT;UPPER
ORIENTATION: RIGHT;LEFT
ORIENTATION: RIGHT;LEFT
ORIENTATION: RIGHT;MID;UPPER
ORIENTATION: RIGHT;LEFT

## 2025-03-24 ASSESSMENT — PAIN DESCRIPTION - FREQUENCY
FREQUENCY: CONTINUOUS
FREQUENCY: CONTINUOUS

## 2025-03-24 ASSESSMENT — PAIN SCALES - GENERAL
PAINLEVEL_OUTOF10: 7
PAINLEVEL_OUTOF10: 4
PAINLEVEL_OUTOF10: 6
PAINLEVEL_OUTOF10: 8
PAINLEVEL_OUTOF10: 7

## 2025-03-24 ASSESSMENT — PAIN DESCRIPTION - DESCRIPTORS
DESCRIPTORS: BURNING;CRAMPING
DESCRIPTORS: ACHING
DESCRIPTORS: ACHING
DESCRIPTORS: CRAMPING;BURNING
DESCRIPTORS: ACHING;DISCOMFORT
DESCRIPTORS: DISCOMFORT

## 2025-03-24 ASSESSMENT — PAIN DESCRIPTION - LOCATION
LOCATION: ABDOMEN

## 2025-03-24 ASSESSMENT — PAIN - FUNCTIONAL ASSESSMENT
PAIN_FUNCTIONAL_ASSESSMENT: ACTIVITIES ARE NOT PREVENTED

## 2025-03-24 ASSESSMENT — PAIN DESCRIPTION - ONSET
ONSET: ON-GOING
ONSET: ON-GOING

## 2025-03-24 ASSESSMENT — PAIN DESCRIPTION - PAIN TYPE
TYPE: ACUTE PAIN

## 2025-03-24 NOTE — PROGRESS NOTES
Blood pressure 98/59, MD made aware. MD discontinued Coreg.     Electronically signed by Grace Peña RN on 3/24/2025 at 5:10 PM

## 2025-03-24 NOTE — PLAN OF CARE
Problem: Discharge Planning  Goal: Discharge to home or other facility with appropriate resources  3/24/2025 0150 by Sarah Leone RN  Outcome: Progressing  3/23/2025 1622 by Nguyen Weston RN  Outcome: Progressing     Problem: Pain  Goal: Verbalizes/displays adequate comfort level or baseline comfort level  3/24/2025 0150 by Sarah Leone RN  Outcome: Progressing  3/23/2025 1622 by Nguyen Weston RN  Outcome: Progressing     Problem: Safety - Adult  Goal: Free from fall injury  3/24/2025 0150 by Sarah Leone RN  Outcome: Progressing  3/23/2025 1622 by Nguyen Weston RN  Outcome: Progressing     Problem: Respiratory - Adult  Goal: Achieves optimal ventilation and oxygenation  Outcome: Progressing     Problem: Skin/Tissue Integrity - Adult  Goal: Skin integrity remains intact  Outcome: Progressing  Goal: Incisions, wounds, or drain sites healing without S/S of infection  Outcome: Progressing  Goal: Oral mucous membranes remain intact  Outcome: Progressing     Problem: Gastrointestinal - Adult  Goal: Minimal or absence of nausea and vomiting  Outcome: Progressing  Goal: Maintains or returns to baseline bowel function  Outcome: Progressing  Goal: Maintains adequate nutritional intake  Outcome: Progressing

## 2025-03-24 NOTE — CONSULTS
GASTROENTEROLOGY INPATIENT CONSULTATION        IDENTIFYING DATA/REASON FOR CONSULTATION   PATIENT:  Rashad Looney  MRN:  [id number]  ADMIT DATE: 3/23/2025  TIME OF EVALUATION: 3/24/2025 2:30 PM  HOSPITAL STAY:   LOS: 1 day     REASON FOR CONSULTATION:  Abdominal pain    HISTORY OF PRESENT ILLNESS   Rashad Looney is a 40 y.o. male with a PMH of decompensated alcoholic cirrhosis of the liver, hepatitis C, alcohol use disorder, tobacco use disorder, asthma, anemia, esophageal varices, who presented on 3/23/2025 with abdominal pain, fatigue, recurrent epistaxis, vomiting. He reports RUQ and LUQ pain and periumbilical pain.  Pain is reported at sharp and burning.  No radiation to his back.  Some associated nausea and vomiting.  He denies hematemesis or melena.   No NSAIDs.   No recent Etoh reported.  Has been seen over at  pre-liver clinic.  He is on Lasix and Aldactone daily.  He is on Lactulose daily.  No recent medication changes.  He reported 4-5 episodes of epistaxis sometimes lasting up to 30 minutes.      Prior Endoscopic Evaluations:    EGD 12/27/2024 Speedy Brown MD   Findings:       Small (< 5 mm) varices were found in the lower third of the esophagus.        They were diminutive in size.       Mild portal hypertensive gastropathy was found in the entire examined        stomach.       Mild gastric antral vascular ectasia was present in the prepyloric        region of the stomach.       There is no endoscopic evidence of ulceration or varices in the entire        examined stomach.       The examined duodenum was normal.                                                                                   Estimated Blood Loss:       Estimated blood loss: none.  Impression:            - Small (< 5 mm) esophageal varices.                         - Portal hypertensive gastropathy.                         - Gastric antral vascular ectasia.                         - Normal examined duodenum.

## 2025-03-24 NOTE — PROGRESS NOTES
Pt AO x4. VSS. Pt has 7/10 pain, PRN pain medication given. Morning medications not yet ordered, MD made aware, still waiting. Morning assessment completed pt appears jaundice. Pt has no questions or concerns. Bed in lowest and locked position, with call light in reach.     Electronically signed by Grace Peña RN on 3/24/2025 at 10:12 AM

## 2025-03-24 NOTE — PLAN OF CARE
Problem: Discharge Planning  Goal: Discharge to home or other facility with appropriate resources  3/24/2025 1010 by Grace Peña RN  Outcome: Progressing  3/24/2025 0150 by Sarah Leone RN  Outcome: Progressing     Problem: Pain  Goal: Verbalizes/displays adequate comfort level or baseline comfort level  3/24/2025 1010 by Grace Peña RN  Outcome: Progressing  3/24/2025 0150 by Sarah Leone RN  Outcome: Progressing     Problem: Safety - Adult  Goal: Free from fall injury  3/24/2025 1010 by Grace Peña RN  Outcome: Progressing  3/24/2025 0150 by Sarah Leone RN  Outcome: Progressing     Problem: Respiratory - Adult  Goal: Achieves optimal ventilation and oxygenation  3/24/2025 1010 by Grace Peña RN  Outcome: Progressing  3/24/2025 0150 by Sarah Leone RN  Outcome: Progressing     Problem: Skin/Tissue Integrity - Adult  Goal: Skin integrity remains intact  3/24/2025 1010 by Grace Peña RN  Outcome: Progressing  Flowsheets (Taken 3/24/2025 1010)  Skin Integrity Remains Intact:   Monitor for areas of redness and/or skin breakdown   Assess vascular access sites hourly  3/24/2025 0150 by Sarah Leone RN  Outcome: Progressing  Goal: Incisions, wounds, or drain sites healing without S/S of infection  3/24/2025 1010 by Grace Peña RN  Outcome: Progressing  3/24/2025 0150 by Sarah Leone RN  Outcome: Progressing  Goal: Oral mucous membranes remain intact  3/24/2025 1010 by Grace Peña RN  Outcome: Progressing  3/24/2025 0150 by Sarah Leone RN  Outcome: Progressing     Problem: Gastrointestinal - Adult  Goal: Minimal or absence of nausea and vomiting  3/24/2025 1010 by Grace Peña RN  Outcome: Progressing  3/24/2025 0150 by Sarah Leone RN  Outcome: Progressing  Goal: Maintains or returns to baseline bowel function  3/24/2025 1010 by Grace Peña RN  Outcome: Progressing  3/24/2025 0150 by Sarah Leone RN  Outcome: Progressing  Goal: Maintains adequate

## 2025-03-24 NOTE — PROGRESS NOTES
Pt WBC 1.8. Call to MD being made.  Platelets 52, Md made aware. NO new orders.     Potassium 3.5, per MD ok to not replace will order fluids with potassium in them.     Electronically signed by Grace Peña RN on 3/24/2025 at 12:12 PM

## 2025-03-24 NOTE — PROGRESS NOTES
Comprehensive Nutrition Assessment    Type and Reason for Visit:  Initial, Positive nutrition screen    Nutrition Recommendations/Plan:   Continue to advance diet as tolerated  Add Ensure Clear tid to start and modify to Ensure as diet advances  Monitor wt trend     Malnutrition Assessment:  Malnutrition Status:  At risk for malnutrition (03/24/25 1518)    Context:  Chronic Illness     Findings of the 6 clinical characteristics of malnutrition:  Energy Intake:  75% or less estimated energy requirements for 1 month or longer  Weight Loss:  Unable to assess     Body Fat Loss:  Unable to assess     Muscle Mass Loss:  Unable to assess    Fluid Accumulation:  No fluid accumulation     Strength:  Not Performed    Nutrition Assessment:    MST for wt loss and decreased po intake. PMH includes; Hx ETOH abuse, Esophageal Varice Banding, End Stage Liver Disease. Pt adm r/t nausea, vomiting and decreased appetite. Diet initially adv to regular. MD then changed diet to Clear Liquids. Spouse reported MD plan to advance diet back to regular if lunch taken this date, is tolerated. Unable to speak with pt during this encounter, so information taken from EMR and pt's wife. Discussed need for ONS. Will add Ensure Clear tid while pt is on Clears and switch to Ensure once diet advanced. No wts available to evaluate for significant loss at 3, 6 and 12 months. Noted that pt did experience a 16.5% loss over the past 9 months. Current wt noted at 162 lbs 5.9 oz. Wt on 6/8/24 noted at 194 lbs 4 oz. Will monitor wt trend. Will continue to monitor progress.    Nutrition Related Findings:    Noted 5% Dextrose IVF at 100 ml per hour. Noted BUN at 5 and Creat at .6. Noted no edema. Noted chronulac on board. Noted Remeron on board which might help improve po intake. Noted Thiamine, B12 and Folic Acid on board. Noted BM on 3/22. Wound Type: None (skin is jaundiced)       Current Nutrition Intake & Therapies:    Average Meal Intake: Unable to

## 2025-03-24 NOTE — CARE COORDINATION
Case Management Assessment  Initial Evaluation    Date/Time of Evaluation: 3/24/2025 2:54 PM  Assessment Completed by: MAITE Borges    If patient is discharged prior to next notation, then this note serves as note for discharge by case management.    Patient Name: Rashad Looney                   YOB: 1985  Diagnosis: Calculus of gallbladder without cholecystitis without obstruction [K80.20]  Alcoholic cirrhosis of liver without ascites (HCC) [K70.30]  Acute pancreatitis without infection or necrosis [K85.90]  Acute pancreatitis without infection or necrosis, unspecified pancreatitis type [K85.90]                   Date / Time: 3/23/2025 10:49 AM    Patient Admission Status: Inpatient   Readmission Risk (Low < 19, Mod (19-27), High > 27): Readmission Risk Score: 20.2    Current PCP: Lolis Mcmanus MD  PCP verified by CM? (P) Yes    Chart Reviewed: Yes      History Provided by: (P) Patient, Spouse  Patient Orientation: (P) Alert and Oriented, Person, Place, Situation, Self    Patient Cognition: (P) Alert    Hospitalization in the last 30 days (Readmission):  No    If yes, Readmission Assessment in  Navigator will be completed.    Advance Directives:      Code Status: Full Code   Patient's Primary Decision Maker is: (P) Legal Next of Kin    Primary Decision Maker: Priyanka Looney - Spouse - [phone]    Discharge Planning:    Patient lives with: (P) Family Members, Spouse/Significant Other Type of Home: (P) House (3 story home with spouse, aunt and uncle-5 REN/ramp leading into home)  Primary Care Giver: (P) Spouse  Patient Support Systems include: (P) Spouse/Significant Other, Family Members   Current Financial resources: (P) Medicaid  Current community resources: (P) None  Current services prior to admission: (P) None            Current DME:              Type of Home Care services:  (P) None    ADLS  Prior functional level: (P) Assistance with the following:, Bathing, Mobility,

## 2025-03-24 NOTE — PROGRESS NOTES
Medication Reconciliation    List of medications patient is currently taking is complete.     Source of information: 1. Conversation with patient's family at bedside                                      2. EPIC records      Allergies  Codeine     Notes regarding home medications:   1. Patient no longer taking gabapentin or olanzapine. Removed from list
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
## 2025-03-25 LAB
ALBUMIN SERPL-MCNC: 3.1 G/DL (ref 3.4–5)
ALBUMIN/GLOB SERPL: 1.1 {RATIO} (ref 1.1–2.2)
ALP SERPL-CCNC: 92 U/L (ref 40–129)
ALT SERPL-CCNC: 99 U/L (ref 10–40)
ANION GAP SERPL CALCULATED.3IONS-SCNC: 8 MMOL/L (ref 3–16)
AST SERPL-CCNC: 133 U/L (ref 15–37)
BASOPHILS # BLD: 0 K/UL (ref 0–0.2)
BASOPHILS NFR BLD: 0.5 %
BILIRUB SERPL-MCNC: 4.2 MG/DL (ref 0–1)
BUN SERPL-MCNC: 5 MG/DL (ref 7–20)
CALCIUM SERPL-MCNC: 8.1 MG/DL (ref 8.3–10.6)
CHLORIDE SERPL-SCNC: 107 MMOL/L (ref 99–110)
CO2 SERPL-SCNC: 21 MMOL/L (ref 21–32)
CREAT SERPL-MCNC: 0.6 MG/DL (ref 0.9–1.3)
DEPRECATED RDW RBC AUTO: 14.3 % (ref 12.4–15.4)
EOSINOPHIL # BLD: 0.1 K/UL (ref 0–0.6)
EOSINOPHIL NFR BLD: 4.7 %
GFR SERPLBLD CREATININE-BSD FMLA CKD-EPI: >90 ML/MIN/{1.73_M2}
GLUCOSE SERPL-MCNC: 132 MG/DL (ref 70–99)
HCT VFR BLD AUTO: 28.9 % (ref 40.5–52.5)
HGB BLD-MCNC: 10.1 G/DL (ref 13.5–17.5)
LIPASE SERPL-CCNC: 54 U/L (ref 13–60)
LYMPHOCYTES # BLD: 0.5 K/UL (ref 1–5.1)
LYMPHOCYTES NFR BLD: 26.6 %
MCH RBC QN AUTO: 35.5 PG (ref 26–34)
MCHC RBC AUTO-ENTMCNC: 34.8 G/DL (ref 31–36)
MCV RBC AUTO: 102 FL (ref 80–100)
MONOCYTES # BLD: 0.2 K/UL (ref 0–1.3)
MONOCYTES NFR BLD: 11.9 %
NEUTROPHILS # BLD: 1.1 K/UL (ref 1.7–7.7)
NEUTROPHILS NFR BLD: 56.3 %
PLATELET # BLD AUTO: 53 K/UL (ref 135–450)
PMV BLD AUTO: 9 FL (ref 5–10.5)
POTASSIUM SERPL-SCNC: 3.9 MMOL/L (ref 3.5–5.1)
PROT SERPL-MCNC: 6 G/DL (ref 6.4–8.2)
RBC # BLD AUTO: 2.83 M/UL (ref 4.2–5.9)
SODIUM SERPL-SCNC: 136 MMOL/L (ref 136–145)
WBC # BLD AUTO: 2 K/UL (ref 4–11)

## 2025-03-25 PROCEDURE — 86708 HEPATITIS A ANTIBODY: CPT

## 2025-03-25 PROCEDURE — 85025 COMPLETE CBC W/AUTO DIFF WBC: CPT

## 2025-03-25 PROCEDURE — 94760 N-INVAS EAR/PLS OXIMETRY 1: CPT

## 2025-03-25 PROCEDURE — 2500000003 HC RX 250 WO HCPCS: Performed by: INTERNAL MEDICINE

## 2025-03-25 PROCEDURE — 36415 COLL VENOUS BLD VENIPUNCTURE: CPT

## 2025-03-25 PROCEDURE — 80053 COMPREHEN METABOLIC PANEL: CPT

## 2025-03-25 PROCEDURE — 83690 ASSAY OF LIPASE: CPT

## 2025-03-25 PROCEDURE — 99232 SBSQ HOSP IP/OBS MODERATE 35: CPT | Performed by: INTERNAL MEDICINE

## 2025-03-25 PROCEDURE — 1200000000 HC SEMI PRIVATE

## 2025-03-25 PROCEDURE — 6360000002 HC RX W HCPCS: Performed by: INTERNAL MEDICINE

## 2025-03-25 PROCEDURE — 6370000000 HC RX 637 (ALT 250 FOR IP): Performed by: INTERNAL MEDICINE

## 2025-03-25 PROCEDURE — 94640 AIRWAY INHALATION TREATMENT: CPT

## 2025-03-25 PROCEDURE — 82105 ALPHA-FETOPROTEIN SERUM: CPT

## 2025-03-25 RX ADMIN — POTASSIUM CHLORIDE AND SODIUM CHLORIDE: 900; 150 INJECTION, SOLUTION INTRAVENOUS at 21:20

## 2025-03-25 RX ADMIN — HYDROMORPHONE HYDROCHLORIDE 0.5 MG: 1 INJECTION, SOLUTION INTRAMUSCULAR; INTRAVENOUS; SUBCUTANEOUS at 01:14

## 2025-03-25 RX ADMIN — HYDROMORPHONE HYDROCHLORIDE 0.5 MG: 1 INJECTION, SOLUTION INTRAMUSCULAR; INTRAVENOUS; SUBCUTANEOUS at 09:19

## 2025-03-25 RX ADMIN — OXYCODONE 5 MG: 5 TABLET ORAL at 21:12

## 2025-03-25 RX ADMIN — Medication 100 MG: at 09:18

## 2025-03-25 RX ADMIN — MIRTAZAPINE 45 MG: 30 TABLET, FILM COATED ORAL at 21:12

## 2025-03-25 RX ADMIN — LACTULOSE 20 G: 10 SOLUTION ORAL at 09:19

## 2025-03-25 RX ADMIN — SPIRONOLACTONE 50 MG: 25 TABLET ORAL at 09:18

## 2025-03-25 RX ADMIN — Medication 100 MCG: at 09:18

## 2025-03-25 RX ADMIN — POTASSIUM CHLORIDE AND SODIUM CHLORIDE: 900; 150 INJECTION, SOLUTION INTRAVENOUS at 11:29

## 2025-03-25 RX ADMIN — HYDROMORPHONE HYDROCHLORIDE 0.5 MG: 1 INJECTION, SOLUTION INTRAMUSCULAR; INTRAVENOUS; SUBCUTANEOUS at 18:01

## 2025-03-25 RX ADMIN — HYDROMORPHONE HYDROCHLORIDE 0.5 MG: 1 INJECTION, SOLUTION INTRAMUSCULAR; INTRAVENOUS; SUBCUTANEOUS at 05:09

## 2025-03-25 RX ADMIN — RIFAXIMIN 550 MG: 550 TABLET ORAL at 21:12

## 2025-03-25 RX ADMIN — SODIUM CHLORIDE, PRESERVATIVE FREE 40 MG: 5 INJECTION INTRAVENOUS at 09:18

## 2025-03-25 RX ADMIN — HYDROMORPHONE HYDROCHLORIDE 0.5 MG: 1 INJECTION, SOLUTION INTRAMUSCULAR; INTRAVENOUS; SUBCUTANEOUS at 13:46

## 2025-03-25 RX ADMIN — FOLIC ACID 1 MG: 1 TABLET ORAL at 09:18

## 2025-03-25 RX ADMIN — Medication 2 PUFF: at 20:14

## 2025-03-25 RX ADMIN — RIFAXIMIN 550 MG: 550 TABLET ORAL at 09:18

## 2025-03-25 RX ADMIN — POTASSIUM CHLORIDE AND SODIUM CHLORIDE: 900; 150 INJECTION, SOLUTION INTRAVENOUS at 01:12

## 2025-03-25 RX ADMIN — Medication 2 PUFF: at 07:42

## 2025-03-25 ASSESSMENT — PAIN - FUNCTIONAL ASSESSMENT
PAIN_FUNCTIONAL_ASSESSMENT: ACTIVITIES ARE NOT PREVENTED

## 2025-03-25 ASSESSMENT — PAIN DESCRIPTION - DESCRIPTORS
DESCRIPTORS: BURNING;CRAMPING
DESCRIPTORS: ACHING
DESCRIPTORS: ACHING;DISCOMFORT
DESCRIPTORS: CRAMPING

## 2025-03-25 ASSESSMENT — PAIN DESCRIPTION - ORIENTATION
ORIENTATION: RIGHT;LEFT;MID
ORIENTATION: RIGHT;LEFT
ORIENTATION: RIGHT
ORIENTATION: RIGHT;LEFT
ORIENTATION: RIGHT

## 2025-03-25 ASSESSMENT — PAIN DESCRIPTION - FREQUENCY
FREQUENCY: CONTINUOUS
FREQUENCY: CONTINUOUS

## 2025-03-25 ASSESSMENT — PAIN SCALES - GENERAL
PAINLEVEL_OUTOF10: 4
PAINLEVEL_OUTOF10: 6
PAINLEVEL_OUTOF10: 8
PAINLEVEL_OUTOF10: 7
PAINLEVEL_OUTOF10: 4
PAINLEVEL_OUTOF10: 7

## 2025-03-25 ASSESSMENT — PAIN DESCRIPTION - LOCATION
LOCATION: ABDOMEN

## 2025-03-25 ASSESSMENT — PAIN DESCRIPTION - ONSET
ONSET: ON-GOING
ONSET: ON-GOING

## 2025-03-25 ASSESSMENT — PAIN DESCRIPTION - PAIN TYPE
TYPE: ACUTE PAIN
TYPE: ACUTE PAIN

## 2025-03-25 ASSESSMENT — PAIN SCALES - WONG BAKER: WONGBAKER_NUMERICALRESPONSE: HURTS LITTLE MORE

## 2025-03-25 NOTE — PROGRESS NOTES
INPATIENT PROGRESS NOTE        IDENTIFYING DATA/REASON FOR CONSULTATION   PATIENT:  Rashad Looney  MRN:  [id number]  ADMIT DATE: 3/23/2025  TIME OF EVALUATION: 3/25/2025 12:09 PM  HOSPITAL STAY:   LOS: 2 days   CONSULTING PHYSICIAN: Lolis Mcmanus MD   REASON FOR CONSULTATION:    Subjective:    Patient seen in follow up   Today patient reports some persistent abdominal discomfort.  Denies nausea/vomiting.      MEDICATIONS   SCHEDULED:  cyanocobalamin, 100 mcg, Daily  folic acid, 1 mg, Daily  mirtazapine, 45 mg, Nightly  lactulose, 20 g, TID  budesonide-formoterol, 2 puff, BID RT  rifAXIMin, 550 mg, BID  vitamin B-1, 100 mg, Daily  nicotine, 1 patch, Daily  spironolactone, 50 mg, Daily  pantoprazole (PROTONIX) 40 mg in sodium chloride (PF) 0.9 % 10 mL injection, 40 mg, Daily      FLUIDS/DRIPS:     0.9% NaCl with KCl 20 mEq 100 mL/hr at 25 1129     PRNs: LORazepam, 0.5 mg, Nightly PRN  potassium chloride, 10 mEq, PRN  oxyCODONE, 5 mg, Q4H PRN  HYDROmorphone, 0.5 mg, Q4H PRN      ALLERGIES:    Allergies   Allergen Reactions    Codeine          PHYSICAL EXAM   VITALS:  /63   Pulse 77   Temp 97.5 °F (36.4 °C) (Oral)   Resp 18   Ht 1.753 m (5' 9\")   Wt 76.2 kg (167 lb 15.9 oz)   SpO2 100%   BMI 24.81 kg/m²   TEMPERATURE:  Current - Temp: 97.5 °F (36.4 °C); Max - Temp  Av.8 °F (36.6 °C)  Min: 97.5 °F (36.4 °C)  Max: 98.1 °F (36.7 °C)    Physical Exam:  General appearance: alert, cooperative, no distress, appears stated age  Eyes: Anicteric  Head: Normocephalic, without obvious abnormality  Lungs: clear to auscultation bilaterally, Normal Effort  Heart: regular rate and rhythm, normal S1 and S2, no murmurs or rubs  Abdomen: soft, non-distended, non-tender. Bowel sounds normal.   Extremities: atraumatic, no cyanosis or edema  Skin: warm and dry, no jaundice  Neuro: Grossly intact, A&OX3    LABS AND IMAGING   Laboratory   Recent Labs     25  1115 25  1123 25  0500   WBC 2.3*  are placed with proximal decompression of varices. EGD 12/27/2024 Small (< 5 mm) esophageal varices, portal gastropathy and mild GAVE.   planning repeat EGD in 12/2025  Ascites: last LVP in 12/2024.  US 2/2025 without ascites.  None on CT this admission.  Controlled with diuretics Lasix 40mg daily and Aldactone 50mg daily.    Encephalopathy:NH3 67 on admission. On lactulose twice daily and rifaximin  HCC screening: CT scan is negative. AFP pending  Immunization:Immune to hepatitis B.   Need to check immune status to Hepatitis A  Transplant candidacy: been sober for months. Being followed by  Health transplant  - > ALT 85. Albumin 2.9 INR 1.7 total bili 3.5      Pancytopenia with Leukopenia- stable.   -Could be worsened by recent recurrent epistaxis and possible mesenteric adenitis vs mesenteritis.  ? Viral mediated superimposed on portal HTN -B12 and folic acid normal.  On MVI, thiamine, folic acid.  .     4.  Alcohol use disorder- not active since 12/2024.      5.  Hepatitis C- gen 1 a.  VL 5700 in 3/2025.          RECOMMENDATIONS:       1) Follow up on  Hep A IgG  2) Continue supportive care   3) Advance to low fat diet as tolerated.   4) Patient can continue to follow up with Dr. Araujo at  pre-liver transplant clinic  5) Continue Lasix 40mg daily and Aldactone 100mg daily   6) Continue Lactulose 20ml tid and Xifaxan bid  7) Continue MVI and Thiamine/folic acid  8) Continue pantoprazole daily for now  9) Continue carvedilol 3.125mg po bid.    OK for D/C in am if stable.   Will sign off.  Follow up with  liver clinic after d/c.      Thank you for allowing me to participate in this patient's care.  If there are any questions or concerns regarding this patient, or the plan we have set in place, please feel free to contact me at [phone].     Jitendra Farmer Jr, DO

## 2025-03-25 NOTE — PROGRESS NOTES
Pt refused afternoon dose of lactulose.    Electronically signed by Grace Peña RN on 3/25/2025 at 1:50 PM

## 2025-03-25 NOTE — PLAN OF CARE
Problem: Discharge Planning  Goal: Discharge to home or other facility with appropriate resources  Outcome: Progressing     Problem: Pain  Goal: Verbalizes/displays adequate comfort level or baseline comfort level  Outcome: Progressing     Problem: Safety - Adult  Goal: Free from fall injury  Outcome: Progressing     Problem: Respiratory - Adult  Goal: Achieves optimal ventilation and oxygenation  Outcome: Progressing     Problem: Skin/Tissue Integrity - Adult  Goal: Skin integrity remains intact  Outcome: Progressing  Goal: Incisions, wounds, or drain sites healing without S/S of infection  Outcome: Progressing  Goal: Oral mucous membranes remain intact  Outcome: Progressing     Problem: Gastrointestinal - Adult  Goal: Minimal or absence of nausea and vomiting  Outcome: Progressing  Goal: Maintains or returns to baseline bowel function  Outcome: Progressing  Goal: Maintains adequate nutritional intake  Outcome: Progressing     Problem: Nutrition Deficit:  Goal: Optimize nutritional status  Outcome: Progressing

## 2025-03-25 NOTE — PROGRESS NOTES
Hospitalist Progress Note  3/25/2025 2:09 PM  Subjective:   Admit Date: 3/23/2025  PCP: Lolis Mcmanus MD  Interval History: pt feels better  Tolerating the diet  On and off abd pain  Chart reviewed.  Diet: ADULT ORAL NUTRITION SUPPLEMENT; Breakfast, Lunch, Dinner; Clear Liquid Oral Supplement  ADULT DIET; Regular; Low Fat/Low Chol/High Fiber/JANE  Medications:   Scheduled Meds:   cyanocobalamin  100 mcg Oral Daily    folic acid  1 mg Oral Daily    mirtazapine  45 mg Oral Nightly    lactulose  20 g Oral TID    budesonide-formoterol  2 puff Inhalation BID RT    rifAXIMin  550 mg Oral BID    vitamin B-1  100 mg Oral Daily    nicotine  1 patch TransDERmal Daily    spironolactone  50 mg Oral Daily    pantoprazole (PROTONIX) 40 mg in sodium chloride (PF) 0.9 % 10 mL injection  40 mg IntraVENous Daily     Continuous Infusions:   0.9% NaCl with KCl 20 mEq 100 mL/hr at 03/25/25 1129     CBC:   Recent Labs     03/23/25  1115 03/24/25  1123 03/25/25  0500   WBC 2.3* 1.8* 2.0*   HGB 10.5* 9.6* 10.1*   PLT 61* 52* 53*     BMP:    Recent Labs     03/23/25  1115 03/24/25  1123 03/25/25  0500    136 136   K 3.6 3.5 3.9   * 109 107   CO2 21 18* 21   BUN 6* 5* 5*   CREATININE 0.7* 0.6* 0.6*   GLUCOSE 126* 129* 132*     Hepatic:   Recent Labs     03/23/25  1115 03/24/25  1123 03/25/25  0500   * 126* 133*   ALT 88* 85* 99*   BILITOT 4.3* 3.5* 4.2*   ALKPHOS 102 97 92     Troponin: No results for input(s): \"TROPONINI\" in the last 72 hours.  BNP: No results for input(s): \"BNP\" in the last 72 hours.  Lipids: No results for input(s): \"CHOL\", \"HDL\" in the last 72 hours.    Invalid input(s): \"LDLCALCU\"  INR:   Recent Labs     03/23/25  1115   INR 1.73*       Objective:   Vitals: /63   Pulse 77   Temp 97.5 °F (36.4 °C) (Oral)   Resp 18   Ht 1.753 m (5' 9\")   Wt 76.2 kg (167 lb 15.9 oz)   SpO2 100%   BMI 24.81 kg/m²   General appearance: alert,awake,oriented x 3 and cooperative with exam  HEENT: Head:

## 2025-03-25 NOTE — PROGRESS NOTES
Pt Ao x4. VSS. Pt has 6/10 pain, PRN given. Morning medications given. Morning assessment completed. Pt appears jaundice, improving since yesterday. Pt turns self in bed, is told to turn if needed, pt is UAL. Pt has no questions or concerns. Bed in lowest and locked position, with call light in reach.     Electronically signed by Grace Peña RN on 3/25/2025 at 10:38 AM

## 2025-03-25 NOTE — PLAN OF CARE
Problem: Discharge Planning  Goal: Discharge to home or other facility with appropriate resources  3/25/2025 1034 by Grace Peña RN  Outcome: Progressing  3/25/2025 0528 by Kinsey Hyde RN  Outcome: Progressing     Problem: Pain  Goal: Verbalizes/displays adequate comfort level or baseline comfort level  3/25/2025 1034 by Grace Peña RN  Outcome: Progressing  3/25/2025 0528 by Kinsey Hyde RN  Outcome: Progressing     Problem: Safety - Adult  Goal: Free from fall injury  3/25/2025 1034 by Grace Peña RN  Outcome: Progressing  3/25/2025 0528 by Kinsey Hyde RN  Outcome: Progressing     Problem: Respiratory - Adult  Goal: Achieves optimal ventilation and oxygenation  3/25/2025 1034 by Grace Peña RN  Outcome: Progressing  3/25/2025 0528 by Kinsey Hyde RN  Outcome: Progressing     Problem: Skin/Tissue Integrity - Adult  Goal: Skin integrity remains intact  3/25/2025 1034 by Grace Peña RN  Outcome: Progressing  Flowsheets (Taken 3/25/2025 1034)  Skin Integrity Remains Intact:   Monitor for areas of redness and/or skin breakdown   Assess vascular access sites hourly  3/25/2025 0528 by Kinsey Hyde RN  Outcome: Progressing  Goal: Incisions, wounds, or drain sites healing without S/S of infection  3/25/2025 1034 by Grace Peña RN  Outcome: Progressing  3/25/2025 0528 by Kinsey Hyde RN  Outcome: Progressing  Goal: Oral mucous membranes remain intact  3/25/2025 1034 by Grace Peña RN  Outcome: Progressing  3/25/2025 0528 by Kinsey Hyde RN  Outcome: Progressing     Problem: Gastrointestinal - Adult  Goal: Minimal or absence of nausea and vomiting  3/25/2025 1034 by Grace Peña RN  Outcome: Progressing  3/25/2025 0528 by Kinsey Hyde RN  Outcome: Progressing  Goal: Maintains or returns to baseline bowel function  3/25/2025 1034 by Grace Peña RN  Outcome: Progressing  3/25/2025 0528 by Kinsey Hyde RN  Outcome: Progressing  Goal: Maintains

## 2025-03-26 VITALS
TEMPERATURE: 98.4 F | HEART RATE: 92 BPM | DIASTOLIC BLOOD PRESSURE: 55 MMHG | BODY MASS INDEX: 24.88 KG/M2 | WEIGHT: 167.99 LBS | RESPIRATION RATE: 16 BRPM | OXYGEN SATURATION: 97 % | HEIGHT: 69 IN | SYSTOLIC BLOOD PRESSURE: 116 MMHG

## 2025-03-26 LAB
AFP-TM SERPL-MCNC: 4.2 UG/L
ALBUMIN SERPL-MCNC: 3.3 G/DL (ref 3.4–5)
ALBUMIN/GLOB SERPL: 1.1 {RATIO} (ref 1.1–2.2)
ALP SERPL-CCNC: 100 U/L (ref 40–129)
ALT SERPL-CCNC: 116 U/L (ref 10–40)
ANION GAP SERPL CALCULATED.3IONS-SCNC: 9 MMOL/L (ref 3–16)
AST SERPL-CCNC: 157 U/L (ref 15–37)
BASOPHILS # BLD: 0 K/UL (ref 0–0.2)
BASOPHILS NFR BLD: 0.7 %
BILIRUB SERPL-MCNC: 5.1 MG/DL (ref 0–1)
BUN SERPL-MCNC: 5 MG/DL (ref 7–20)
CALCIUM SERPL-MCNC: 8.8 MG/DL (ref 8.3–10.6)
CHLORIDE SERPL-SCNC: 106 MMOL/L (ref 99–110)
CO2 SERPL-SCNC: 22 MMOL/L (ref 21–32)
CREAT SERPL-MCNC: 0.6 MG/DL (ref 0.9–1.3)
DEPRECATED RDW RBC AUTO: 14.3 % (ref 12.4–15.4)
EOSINOPHIL # BLD: 0.1 K/UL (ref 0–0.6)
EOSINOPHIL NFR BLD: 4.6 %
GFR SERPLBLD CREATININE-BSD FMLA CKD-EPI: >90 ML/MIN/{1.73_M2}
GLUCOSE SERPL-MCNC: 88 MG/DL (ref 70–99)
HAV AB SER QL IA: POSITIVE
HCT VFR BLD AUTO: 28.5 % (ref 40.5–52.5)
HGB BLD-MCNC: 10.1 G/DL (ref 13.5–17.5)
LYMPHOCYTES # BLD: 0.7 K/UL (ref 1–5.1)
LYMPHOCYTES NFR BLD: 27.5 %
MCH RBC QN AUTO: 36.4 PG (ref 26–34)
MCHC RBC AUTO-ENTMCNC: 35.7 G/DL (ref 31–36)
MCV RBC AUTO: 102 FL (ref 80–100)
MONOCYTES # BLD: 0.3 K/UL (ref 0–1.3)
MONOCYTES NFR BLD: 13.9 %
NEUTROPHILS # BLD: 1.3 K/UL (ref 1.7–7.7)
NEUTROPHILS NFR BLD: 53.3 %
PLATELET # BLD AUTO: 57 K/UL (ref 135–450)
PMV BLD AUTO: 8.8 FL (ref 5–10.5)
POTASSIUM SERPL-SCNC: 4.3 MMOL/L (ref 3.5–5.1)
PROT SERPL-MCNC: 6.4 G/DL (ref 6.4–8.2)
RBC # BLD AUTO: 2.79 M/UL (ref 4.2–5.9)
SODIUM SERPL-SCNC: 137 MMOL/L (ref 136–145)
WBC # BLD AUTO: 2.4 K/UL (ref 4–11)

## 2025-03-26 PROCEDURE — 94761 N-INVAS EAR/PLS OXIMETRY MLT: CPT

## 2025-03-26 PROCEDURE — 6370000000 HC RX 637 (ALT 250 FOR IP): Performed by: INTERNAL MEDICINE

## 2025-03-26 PROCEDURE — 80053 COMPREHEN METABOLIC PANEL: CPT

## 2025-03-26 PROCEDURE — 85025 COMPLETE CBC W/AUTO DIFF WBC: CPT

## 2025-03-26 PROCEDURE — 94640 AIRWAY INHALATION TREATMENT: CPT

## 2025-03-26 PROCEDURE — 6360000002 HC RX W HCPCS: Performed by: INTERNAL MEDICINE

## 2025-03-26 PROCEDURE — 99238 HOSP IP/OBS DSCHRG MGMT 30/<: CPT | Performed by: INTERNAL MEDICINE

## 2025-03-26 PROCEDURE — 36415 COLL VENOUS BLD VENIPUNCTURE: CPT

## 2025-03-26 PROCEDURE — 2500000003 HC RX 250 WO HCPCS: Performed by: INTERNAL MEDICINE

## 2025-03-26 RX ORDER — SPIRONOLACTONE 50 MG/1
50 TABLET, FILM COATED ORAL DAILY
Qty: 30 TABLET | Refills: 3 | Status: SHIPPED | OUTPATIENT
Start: 2025-03-27

## 2025-03-26 RX ORDER — PANTOPRAZOLE SODIUM 40 MG/1
40 TABLET, DELAYED RELEASE ORAL
Status: DISCONTINUED | OUTPATIENT
Start: 2025-03-27 | End: 2025-03-26 | Stop reason: HOSPADM

## 2025-03-26 RX ADMIN — FOLIC ACID 1 MG: 1 TABLET ORAL at 09:23

## 2025-03-26 RX ADMIN — HYDROMORPHONE HYDROCHLORIDE 0.5 MG: 1 INJECTION, SOLUTION INTRAMUSCULAR; INTRAVENOUS; SUBCUTANEOUS at 08:53

## 2025-03-26 RX ADMIN — HYDROMORPHONE HYDROCHLORIDE 0.5 MG: 1 INJECTION, SOLUTION INTRAMUSCULAR; INTRAVENOUS; SUBCUTANEOUS at 00:12

## 2025-03-26 RX ADMIN — RIFAXIMIN 550 MG: 550 TABLET ORAL at 09:23

## 2025-03-26 RX ADMIN — Medication 100 MCG: at 09:23

## 2025-03-26 RX ADMIN — POTASSIUM CHLORIDE AND SODIUM CHLORIDE: 900; 150 INJECTION, SOLUTION INTRAVENOUS at 07:30

## 2025-03-26 RX ADMIN — HYDROMORPHONE HYDROCHLORIDE 0.5 MG: 1 INJECTION, SOLUTION INTRAMUSCULAR; INTRAVENOUS; SUBCUTANEOUS at 04:09

## 2025-03-26 RX ADMIN — SODIUM CHLORIDE, PRESERVATIVE FREE 40 MG: 5 INJECTION INTRAVENOUS at 09:23

## 2025-03-26 RX ADMIN — Medication 2 PUFF: at 08:20

## 2025-03-26 RX ADMIN — SPIRONOLACTONE 50 MG: 25 TABLET ORAL at 09:23

## 2025-03-26 RX ADMIN — OXYCODONE 5 MG: 5 TABLET ORAL at 14:06

## 2025-03-26 RX ADMIN — LACTULOSE 20 G: 10 SOLUTION ORAL at 09:23

## 2025-03-26 RX ADMIN — Medication 100 MG: at 09:23

## 2025-03-26 ASSESSMENT — PAIN SCALES - GENERAL
PAINLEVEL_OUTOF10: 7
PAINLEVEL_OUTOF10: 7
PAINLEVEL_OUTOF10: 6
PAINLEVEL_OUTOF10: 7
PAINLEVEL_OUTOF10: 3
PAINLEVEL_OUTOF10: 3

## 2025-03-26 ASSESSMENT — PAIN DESCRIPTION - DESCRIPTORS
DESCRIPTORS: ACHING
DESCRIPTORS: ACHING

## 2025-03-26 ASSESSMENT — PAIN DESCRIPTION - LOCATION
LOCATION: ABDOMEN
LOCATION: ABDOMEN

## 2025-03-26 ASSESSMENT — PAIN DESCRIPTION - ORIENTATION
ORIENTATION: RIGHT
ORIENTATION: RIGHT

## 2025-03-26 NOTE — PLAN OF CARE
Problem: Discharge Planning  Goal: Discharge to home or other facility with appropriate resources  Outcome: Progressing     Problem: Pain  Goal: Verbalizes/displays adequate comfort level or baseline comfort level  Outcome: Progressing     Problem: Safety - Adult  Goal: Free from fall injury  Outcome: Progressing     Problem: Respiratory - Adult  Goal: Achieves optimal ventilation and oxygenation  Outcome: Progressing     Problem: Skin/Tissue Integrity - Adult  Goal: Skin integrity remains intact  Outcome: Progressing  Flowsheets (Taken 3/25/2025 2000)  Skin Integrity Remains Intact: Monitor for areas of redness and/or skin breakdown  Goal: Incisions, wounds, or drain sites healing without S/S of infection  Outcome: Progressing  Goal: Oral mucous membranes remain intact  Outcome: Progressing     Problem: Gastrointestinal - Adult  Goal: Minimal or absence of nausea and vomiting  Outcome: Progressing  Goal: Maintains or returns to baseline bowel function  Outcome: Progressing  Goal: Maintains adequate nutritional intake  Outcome: Progressing     Problem: Nutrition Deficit:  Goal: Optimize nutritional status  Outcome: Progressing

## 2025-03-26 NOTE — PROGRESS NOTES
ONCOLOGY HEMATOLOGY CARE PROGRESS NOTE      SUBJECTIVE:    Pt denies complaint  ROS:     Constitutional:  No weight loss, No fever, No chills, No night sweats.  Energy level good.  Eyes:  No impairment or change in vision  ENT / Mouth:  No pain, abnormal ulceration, bleeding, nasal drip or change in voice or hearing  Cardiovascular:  No chest pain, palpitations, new edema, or calf discomfort  Respiratory:  No pain, hemoptysis, change to breathing  Breast:  No pain, discharge, change in appearance or texture  Gastrointestinal:  No pain, cramping, jaundice, change to eating and bowel habits  Urinary:  No pain, bleeding or change in continence  Genitalia: No pain, bleeding or discharge  Musculoskeletal:  No redness, pain, edema or weakness  Skin:  No pruritus, rash, change to nodules or lesions  Neurologic:  No discomfort, change in mental status, speech, sensory or motor activity  Psychiatric:  No change in concentration or change to affect or mood  Endocrine:  No hot flashes, increased thirst, or change to urine production  Hematologic: No petechiae, ecchymosis or bleeding  Lymphatic:  No lymphadenopathy or lymphedema  Allergy / Immunologic:  No eczema, hives, frequent or recurrent infections    OBJECTIVE        Physical    VITALS:  Patient Vitals for the past 24 hrs:   BP Temp Temp src Pulse Resp SpO2 Weight   03/26/25 0756 (!) 116/55 98.4 °F (36.9 °C) Oral 90 16 98 % --   03/26/25 0515 -- -- -- -- -- -- 76.2 kg (167 lb 15.9 oz)   03/26/25 0439 -- -- -- -- 18 -- --   03/26/25 0417 111/63 98.1 °F (36.7 °C) Oral 89 18 97 % --   03/26/25 0409 -- -- -- -- 18 -- --   03/26/25 0042 -- -- -- -- 17 -- --   03/25/25 2142 -- -- -- -- 18 -- --   03/25/25 2014 -- -- -- 92 16 97 % --   03/25/25 2000 112/65 98.4 °F (36.9 °C) Oral 88 16 100 % --       24HR INTAKE/OUTPUT:    Intake/Output Summary (Last 24 hours) at 3/26/2025 0805  Last data filed at 3/25/2025 0902  Gross per 24 hour   Intake --

## 2025-03-26 NOTE — PLAN OF CARE
Problem: Discharge Planning  Goal: Discharge to home or other facility with appropriate resources  3/26/2025 1344 by Kasandra Martinez RN  Outcome: Completed  3/26/2025 0506 by Kinsey Hyde RN  Outcome: Progressing     Problem: Pain  Goal: Verbalizes/displays adequate comfort level or baseline comfort level  3/26/2025 1344 by Kasandra Martinez RN  Outcome: Completed  3/26/2025 0506 by Kinsey Hyde RN  Outcome: Progressing     Problem: Safety - Adult  Goal: Free from fall injury  3/26/2025 1344 by Kasandra Martinez RN  Outcome: Completed  3/26/2025 0506 by Kinsey Hyde RN  Outcome: Progressing     Problem: Respiratory - Adult  Goal: Achieves optimal ventilation and oxygenation  3/26/2025 1344 by Kasandra Martinez RN  Outcome: Completed  3/26/2025 0506 by Kinsey Hyde RN  Outcome: Progressing     Problem: Skin/Tissue Integrity - Adult  Goal: Skin integrity remains intact  3/26/2025 1344 by Kasandra Martinez RN  Outcome: Completed  3/26/2025 0506 by Kinsey Hyde RN  Outcome: Progressing  Flowsheets (Taken 3/25/2025 2000)  Skin Integrity Remains Intact: Monitor for areas of redness and/or skin breakdown  Goal: Incisions, wounds, or drain sites healing without S/S of infection  3/26/2025 1344 by Kasandra Martinez RN  Outcome: Completed  3/26/2025 0506 by Kinsey Hyde RN  Outcome: Progressing  Goal: Oral mucous membranes remain intact  3/26/2025 1344 by Kasandra Martinez RN  Outcome: Completed  3/26/2025 0506 by Kinsey Hyde RN  Outcome: Progressing     Problem: Gastrointestinal - Adult  Goal: Minimal or absence of nausea and vomiting  3/26/2025 1344 by Kasandra Martinez RN  Outcome: Completed  3/26/2025 0506 by Kinsey Hyde RN  Outcome: Progressing  Goal: Maintains or returns to baseline bowel function  3/26/2025 1344 by Kasandra Martinez RN  Outcome: Completed  3/26/2025 0506 by Kinsey Hyde RN  Outcome: Progressing  Goal: Maintains adequate

## 2025-03-28 ENCOUNTER — HOSPITAL ENCOUNTER (EMERGENCY)
Age: 40
Discharge: HOME | End: 2025-03-28
Attending: EMERGENCY MEDICINE
Payer: COMMERCIAL

## 2025-03-28 ENCOUNTER — APPOINTMENT (OUTPATIENT)
Dept: GENERAL RADIOLOGY | Age: 40
End: 2025-03-28
Payer: COMMERCIAL

## 2025-03-28 VITALS
OXYGEN SATURATION: 99 % | RESPIRATION RATE: 15 BRPM | BODY MASS INDEX: 24.2 KG/M2 | HEIGHT: 69 IN | TEMPERATURE: 99 F | HEART RATE: 93 BPM | WEIGHT: 163.36 LBS | SYSTOLIC BLOOD PRESSURE: 116 MMHG | DIASTOLIC BLOOD PRESSURE: 72 MMHG

## 2025-03-28 DIAGNOSIS — E80.6 HYPERBILIRUBINEMIA: ICD-10-CM

## 2025-03-28 DIAGNOSIS — K74.69 OTHER CIRRHOSIS OF LIVER: ICD-10-CM

## 2025-03-28 DIAGNOSIS — R10.84 GENERALIZED ABDOMINAL PAIN: Primary | ICD-10-CM

## 2025-03-28 LAB
ALBUMIN SERPL-MCNC: 3.8 G/DL (ref 3.4–5)
ALBUMIN/GLOB SERPL: 1.1 {RATIO} (ref 1.1–2.2)
ALP SERPL-CCNC: 106 U/L (ref 40–129)
ALT SERPL-CCNC: 137 U/L (ref 10–40)
AMMONIA PLAS-SCNC: 41 UMOL/L (ref 16–60)
ANION GAP SERPL CALCULATED.3IONS-SCNC: 9 MMOL/L (ref 3–16)
AST SERPL-CCNC: 173 U/L (ref 15–37)
BACTERIA URNS QL MICRO: NORMAL /HPF
BASOPHILS # BLD: 0 K/UL (ref 0–0.2)
BASOPHILS NFR BLD: 0.8 %
BILIRUB SERPL-MCNC: 6.1 MG/DL (ref 0–1)
BILIRUB UR QL STRIP.AUTO: ABNORMAL
BUN SERPL-MCNC: 5 MG/DL (ref 7–20)
CALCIUM SERPL-MCNC: 9 MG/DL (ref 8.3–10.6)
CHLORIDE SERPL-SCNC: 98 MMOL/L (ref 99–110)
CLARITY UR: CLEAR
CO2 SERPL-SCNC: 24 MMOL/L (ref 21–32)
COLOR UR: ABNORMAL
CREAT SERPL-MCNC: 0.6 MG/DL (ref 0.9–1.3)
DEPRECATED RDW RBC AUTO: 14.6 % (ref 12.4–15.4)
EKG ATRIAL RATE: 92 BPM
EKG DIAGNOSIS: NORMAL
EKG P AXIS: -5 DEGREES
EKG P-R INTERVAL: 140 MS
EKG Q-T INTERVAL: 364 MS
EKG QRS DURATION: 86 MS
EKG QTC CALCULATION (BAZETT): 450 MS
EKG R AXIS: 35 DEGREES
EKG T AXIS: 42 DEGREES
EKG VENTRICULAR RATE: 92 BPM
EOSINOPHIL # BLD: 0.1 K/UL (ref 0–0.6)
EOSINOPHIL NFR BLD: 3.9 %
EPI CELLS #/AREA URNS AUTO: 0 /HPF (ref 0–5)
ETHANOLAMINE SERPL-MCNC: NORMAL MG/DL (ref 0–0.08)
GFR SERPLBLD CREATININE-BSD FMLA CKD-EPI: >90 ML/MIN/{1.73_M2}
GLUCOSE SERPL-MCNC: 98 MG/DL (ref 70–99)
GLUCOSE UR STRIP.AUTO-MCNC: NEGATIVE MG/DL
HCT VFR BLD AUTO: 33.8 % (ref 40.5–52.5)
HEMOCCULT STL QL: NORMAL
HGB BLD-MCNC: 11.8 G/DL (ref 13.5–17.5)
HGB UR QL STRIP.AUTO: NEGATIVE
HYALINE CASTS #/AREA URNS AUTO: 0 /LPF (ref 0–8)
KETONES UR STRIP.AUTO-MCNC: ABNORMAL MG/DL
LACTATE BLDV-SCNC: 2.5 MMOL/L (ref 0.4–1.9)
LEUKOCYTE ESTERASE UR QL STRIP.AUTO: ABNORMAL
LIPASE SERPL-CCNC: 47 U/L (ref 13–60)
LYMPHOCYTES # BLD: 0.5 K/UL (ref 1–5.1)
LYMPHOCYTES NFR BLD: 15.7 %
MCH RBC QN AUTO: 36.1 PG (ref 26–34)
MCHC RBC AUTO-ENTMCNC: 35 G/DL (ref 31–36)
MCV RBC AUTO: 103.1 FL (ref 80–100)
MONOCYTES # BLD: 0.4 K/UL (ref 0–1.3)
MONOCYTES NFR BLD: 12.3 %
NEUTROPHILS # BLD: 2 K/UL (ref 1.7–7.7)
NEUTROPHILS NFR BLD: 67.3 %
NITRITE UR QL STRIP.AUTO: NEGATIVE
NT-PROBNP SERPL-MCNC: 74 PG/ML (ref 0–124)
PH UR STRIP.AUTO: 6.5 [PH] (ref 5–8)
PLATELET # BLD AUTO: 64 K/UL (ref 135–450)
PMV BLD AUTO: 8.2 FL (ref 5–10.5)
POTASSIUM SERPL-SCNC: 3.8 MMOL/L (ref 3.5–5.1)
PROT SERPL-MCNC: 7.4 G/DL (ref 6.4–8.2)
PROT UR STRIP.AUTO-MCNC: NEGATIVE MG/DL
RBC # BLD AUTO: 3.28 M/UL (ref 4.2–5.9)
RBC CLUMPS #/AREA URNS AUTO: 0 /HPF (ref 0–4)
SODIUM SERPL-SCNC: 131 MMOL/L (ref 136–145)
SP GR UR STRIP.AUTO: 1.02 (ref 1–1.03)
TROPONIN, HIGH SENSITIVITY: <6 NG/L (ref 0–22)
UA COMPLETE W REFLEX CULTURE PNL UR: ABNORMAL
UA DIPSTICK W REFLEX MICRO PNL UR: YES
URN SPEC COLLECT METH UR: ABNORMAL
UROBILINOGEN UR STRIP-ACNC: 1 E.U./DL
WBC # BLD AUTO: 3 K/UL (ref 4–11)
WBC #/AREA URNS AUTO: 1 /HPF (ref 0–5)

## 2025-03-28 PROCEDURE — 6360000002 HC RX W HCPCS: Performed by: EMERGENCY MEDICINE

## 2025-03-28 PROCEDURE — 93010 ELECTROCARDIOGRAM REPORT: CPT | Performed by: INTERNAL MEDICINE

## 2025-03-28 PROCEDURE — 82270 OCCULT BLOOD FECES: CPT

## 2025-03-28 PROCEDURE — 83605 ASSAY OF LACTIC ACID: CPT

## 2025-03-28 PROCEDURE — 85025 COMPLETE CBC W/AUTO DIFF WBC: CPT

## 2025-03-28 PROCEDURE — 2580000003 HC RX 258: Performed by: EMERGENCY MEDICINE

## 2025-03-28 PROCEDURE — 96376 TX/PRO/DX INJ SAME DRUG ADON: CPT

## 2025-03-28 PROCEDURE — 96374 THER/PROPH/DIAG INJ IV PUSH: CPT

## 2025-03-28 PROCEDURE — 71045 X-RAY EXAM CHEST 1 VIEW: CPT

## 2025-03-28 PROCEDURE — 99285 EMERGENCY DEPT VISIT HI MDM: CPT

## 2025-03-28 PROCEDURE — 83880 ASSAY OF NATRIURETIC PEPTIDE: CPT

## 2025-03-28 PROCEDURE — 36415 COLL VENOUS BLD VENIPUNCTURE: CPT

## 2025-03-28 PROCEDURE — 81001 URINALYSIS AUTO W/SCOPE: CPT

## 2025-03-28 PROCEDURE — 96375 TX/PRO/DX INJ NEW DRUG ADDON: CPT

## 2025-03-28 PROCEDURE — 82140 ASSAY OF AMMONIA: CPT

## 2025-03-28 PROCEDURE — 80053 COMPREHEN METABOLIC PANEL: CPT

## 2025-03-28 PROCEDURE — 83690 ASSAY OF LIPASE: CPT

## 2025-03-28 PROCEDURE — 84484 ASSAY OF TROPONIN QUANT: CPT

## 2025-03-28 PROCEDURE — 6370000000 HC RX 637 (ALT 250 FOR IP): Performed by: EMERGENCY MEDICINE

## 2025-03-28 PROCEDURE — 93005 ELECTROCARDIOGRAM TRACING: CPT | Performed by: EMERGENCY MEDICINE

## 2025-03-28 PROCEDURE — 82077 ASSAY SPEC XCP UR&BREATH IA: CPT

## 2025-03-28 RX ORDER — FENTANYL CITRATE 50 UG/ML
50 INJECTION, SOLUTION INTRAMUSCULAR; INTRAVENOUS ONCE
Status: COMPLETED | OUTPATIENT
Start: 2025-03-28 | End: 2025-03-28

## 2025-03-28 RX ORDER — 0.9 % SODIUM CHLORIDE 0.9 %
500 INTRAVENOUS SOLUTION INTRAVENOUS ONCE
Status: COMPLETED | OUTPATIENT
Start: 2025-03-28 | End: 2025-03-28

## 2025-03-28 RX ORDER — OXYCODONE AND ACETAMINOPHEN 5; 325 MG/1; MG/1
1 TABLET ORAL ONCE
Refills: 0 | Status: COMPLETED | OUTPATIENT
Start: 2025-03-28 | End: 2025-03-28

## 2025-03-28 RX ORDER — ONDANSETRON 2 MG/ML
4 INJECTION INTRAMUSCULAR; INTRAVENOUS ONCE
Status: COMPLETED | OUTPATIENT
Start: 2025-03-28 | End: 2025-03-28

## 2025-03-28 RX ADMIN — ONDANSETRON 4 MG: 2 INJECTION, SOLUTION INTRAMUSCULAR; INTRAVENOUS at 04:24

## 2025-03-28 RX ADMIN — OXYCODONE AND ACETAMINOPHEN 1 TABLET: 325; 5 TABLET ORAL at 07:51

## 2025-03-28 RX ADMIN — SODIUM CHLORIDE 500 ML: 9 INJECTION, SOLUTION INTRAVENOUS at 06:35

## 2025-03-28 RX ADMIN — FENTANYL CITRATE 50 MCG: 50 INJECTION INTRAMUSCULAR; INTRAVENOUS at 06:28

## 2025-03-28 RX ADMIN — FENTANYL CITRATE 50 MCG: 50 INJECTION INTRAMUSCULAR; INTRAVENOUS at 04:26

## 2025-03-28 ASSESSMENT — PAIN SCALES - GENERAL
PAINLEVEL_OUTOF10: 6
PAINLEVEL_OUTOF10: 8
PAINLEVEL_OUTOF10: 6
PAINLEVEL_OUTOF10: 7

## 2025-03-28 ASSESSMENT — PAIN DESCRIPTION - DESCRIPTORS: DESCRIPTORS: THROBBING

## 2025-03-28 ASSESSMENT — PAIN DESCRIPTION - ORIENTATION: ORIENTATION: RIGHT;UPPER

## 2025-03-28 ASSESSMENT — PAIN DESCRIPTION - LOCATION
LOCATION: ABDOMEN
LOCATION: ABDOMEN

## 2025-03-28 ASSESSMENT — PAIN - FUNCTIONAL ASSESSMENT
PAIN_FUNCTIONAL_ASSESSMENT: 0-10
PAIN_FUNCTIONAL_ASSESSMENT: ACTIVITIES ARE NOT PREVENTED

## 2025-03-28 NOTE — ED TRIAGE NOTES
Pt presents to ED for left to right upper abdominal pain, grey stools, and dark red blood in his stool x2 today. States the abdominal pain and grey stool has been on going but the dark red blood in his stool is new.

## 2025-03-28 NOTE — ED NOTES
Shift handoff report given to Yvonne GUSTAFSON at patient's bedside. VSS and call light within reach. Recent and pending orders reviewed with oncoming nurse. Patient updated on plan of care. All questions answered at this time.

## 2025-03-28 NOTE — ED PROVIDER NOTES
EMERGENCY DEPARTMENT ENCOUNTER     [facility] EMERGENCY DEPARTMENT     Pt Name: Rashad Looney   MRN: [id number]   Birthdate 1985   Date of evaluation: 3/28/2025   Provider: Atif Pineda MD   PCP: Lolis Mcmanus MD   Note Started: 3:47 AM EDT 3/28/25     CHIEF COMPLAINT     Chief Complaint   Patient presents with   • Abdominal Pain   • Stool Concern        HISTORY OF PRESENT ILLNESS:  History from : Patient   {Limitations to history (Optional):81894}     Rashad Looney is a 40 y.o. male who presents for evaluation of melena and abdominal pain.  Patient has a history of cirrhosis secondary to alcohol use.  He had a recent admission for epigastric abdominal pain.  Patient reports today that 2 stools appear to be melanotic stools.  States that his stools have been very light-colored.  Ports amount of dark urine.  He denies fever.  He denies nausea or vomiting.  He has not taken medications for symptoms.  He reports he has never had a colonoscopy.     Nursing Notes were all reviewed and agreed with or any disagreements were addressed in the HPI.     ROS: Positives and Pertinent negatives as per HPI.    PAST MEDICAL HISTORY     Past medical history:  has a past medical history of Adverse anesthesia outcome, Alcohol use disorder, Alcoholic cirrhosis of liver with ascites (HCC), Anemia, Asthma, Chest tightness, Chronic liver disease, Class 1 obesity, Dyspnea on exertion, Esophageal varices (HCC), Hepatitis C, Pancytopenia (HCC), Slurred speech, and Tobacco use disorder.    Past surgical history:  has a past surgical history that includes Upper gastrointestinal endoscopy (N/A, 12/26/2023); Upper gastrointestinal endoscopy (N/A, 01/23/2024); Upper gastrointestinal endoscopy (N/A, 06/07/2024); Leg Surgery; and Liver surgery (2011).    Med list:   Current Facility-Administered Medications on File Prior to Encounter   Medication Dose Route Frequency Provider Last Rate Last Admin   • sodium chloride  he is agreeable to plan of care.              Vitals:    Vitals:    03/28/25 0134   BP: 137/83   Pulse: (!) 102   Resp: 18   Temp: 99 °F (37.2 °C)   SpO2: 100%   Weight: 74.1 kg (163 lb 5.8 oz)   Height: 1.753 m (5' 9\")        Patient was given the following medications:   Medications   ondansetron (ZOFRAN) injection 4 mg (has no administration in time range)   fentaNYL (SUBLIMAZE) injection 50 mcg (has no administration in time range)         [place] Coma Scale  Eye Opening: Spontaneous  Best Verbal Response: Oriented  Best Motor Response: Obeys commands  Raf Coma Scale Score: 15          Procedures    Is this patient to be included in the SEP-1 Core Measure due to severe sepsis or septic shock?   No   Exclusion criteria - the patient is NOT to be included for SEP-1 Core Measure due to:  Alternative explanation for abnormal labs/vitals that do not relate to sepsis, see MDM for further explanation      Records Reviewed: Reviewed most recent admission from 3/23/2025    CONSULTS: N/A        Chronic Conditions:   Past Medical History:   Diagnosis Date    Adverse anesthesia outcome     Patient wakes up agressivly in PACU    Alcohol use disorder     Alcoholic cirrhosis of liver with ascites (HCC)     2 L drained june 2024    Anemia     Asthma     Chest tightness     Chronic liver disease     Class 1 obesity     Dyspnea on exertion     sob with any exertion, even talking    Esophageal varices (HCC)     Hepatitis C     Pancytopenia (HCC)     Slurred speech     Tobacco use disorder        Disposition Considerations: Consider obtaining COVID and flu testing was not change patient disposition.     Critical Care: I personally saw the patient and independently provided 10 minutes of non-concurrent critical care out of the total shared critical care time excluding separately billable procedures.      I am the primary physician of Record.     FINAL IMPRESSION    1. Generalized abdominal pain    2. Other cirrhosis of liver

## 2025-03-29 NOTE — PROGRESS NOTES
Physician Progress Note      PATIENT:               SANDRA MCGREGOR  CSN #:                  [id number]  :                       1985  ADMIT DATE:       3/23/2025 10:49 AM  DISCH DATE:        3/26/2025 3:30 PM  RESPONDING  PROVIDER #:        Lolis Mcmanus MD          QUERY TEXT:    Patient admitted with abdominal pain. Documentation reflects acute   pancreatitis.  If possible, please document in the progress notes and   discharge summary if acute pancreatitis was:    The medical record reflects the following:  Risk Factors: 40 y.o with end stage liver disease  Clinical Indicators: 3/24 - IM H+P - 40 y.o. male who presents to [facility] with c/o as above (epistaxis). Diagnosed with possible pancreatitis.   Acute pancreatitis - better. 3/24 - GI - Abdominal pain- Nonspecific upper   abdominal pain. Lipase was only 2 x ULN but CT without classic pancreatitis   and pain is not typical of pancreatic pain.  This is non-specific and could be   related to mild subacute pancreatitis. Decompensated Cirrhosis with portal   HTN: Etiology- Etoh and chronic HCV  3/25 - IM DC - Abd pain- better.  Treatment: GI consult, imaging, serial liver enzymes  Options provided:  -- After study acute pancreatitis ruled out. This patient has abdominal pain   due to decompensated alcoholic cirrhosis.  -- This patient has abdominal pain due to acute pancreatitis.  -- Other - I will add my own diagnosis  -- Disagree - Not applicable / Not valid  -- Disagree - Clinically unable to determine / Unknown  -- Refer to Clinical Documentation Reviewer    PROVIDER RESPONSE TEXT:    After study acute pancreatitis ruled out. This patient has abdominal pain due   to decompensated alcoholic cirrhosis    Query created by: Isai Pineda on 3/28/2025 10:11 AM      Electronically signed by:  Lolis Mcmanus MD 3/28/2025 10:26 PM

## 2025-08-11 ENCOUNTER — HOSPITAL ENCOUNTER (EMERGENCY)
Age: 40
Discharge: LEFT BEFORE DISCHARGE | End: 2025-08-11

## (undated) PROCEDURE — 06L38CZ OCCLUSION OF ESOPHAGEAL VEIN WITH EXTRALUMINAL DEVICE, VIA NATURAL OR ARTIFICIAL OPENING ENDOSCOPIC: ICD-10-PCS

## (undated) DEVICE — ENDOSCOPY KIT: Brand: MEDLINE INDUSTRIES, INC.

## (undated) DEVICE — BITE BLOCK ENDOSCP AD 60 FR W/ ADJ STRP PLAS GRN BLOX

## (undated) DEVICE — SIX SHOOTER SAEED MULTI-BAND LIGATOR: Brand: SAEED